# Patient Record
Sex: FEMALE | Race: WHITE | NOT HISPANIC OR LATINO | Employment: OTHER | URBAN - METROPOLITAN AREA
[De-identification: names, ages, dates, MRNs, and addresses within clinical notes are randomized per-mention and may not be internally consistent; named-entity substitution may affect disease eponyms.]

---

## 2017-01-24 ENCOUNTER — CONVERSION ENCOUNTER (OUTPATIENT)
Dept: FAMILY MEDICINE CLINIC | Facility: CLINIC | Age: 72
End: 2017-01-24

## 2017-01-25 LAB
ALBUMIN SERPL-MCNC: 4.4 G/DL (ref 3.5–4.8)
ALBUMIN/GLOB SERPL: 1.7 {RATIO} (ref 1.1–2.5)
ALP SERPL-CCNC: 85 IU/L (ref 39–117)
ALT SERPL-CCNC: 23 IU/L (ref 0–32)
AMBIG ABBREV CMP14 DEFAULT: NORMAL
AMBIG ABBREV LP DEFAULT: NORMAL
AST SERPL-CCNC: 25 IU/L (ref 0–40)
BASOPHILS # BLD AUTO: 0 X10E3/UL (ref 0–0.2)
BASOPHILS NFR BLD AUTO: 0 %
BILIRUB SERPL-MCNC: 0.6 MG/DL (ref 0–1.2)
BUN SERPL-MCNC: 21 MG/DL (ref 8–27)
BUN/CREAT SERPL: 16 (ref 11–26)
CALCIUM SERPL-MCNC: 10 MG/DL (ref 8.7–10.3)
CHLORIDE SERPL-SCNC: 106 MMOL/L (ref 96–106)
CHOLEST SERPL-MCNC: 157 MG/DL (ref 100–199)
CO2 SERPL-SCNC: 21 MMOL/L (ref 18–29)
CREAT SERPL-MCNC: 1.29 MG/DL (ref 0.57–1)
EOSINOPHIL # BLD AUTO: 0.1 X10E3/UL (ref 0–0.4)
EOSINOPHIL NFR BLD AUTO: 1 %
ERYTHROCYTE [DISTWIDTH] IN BLOOD BY AUTOMATED COUNT: 13.5 % (ref 12.3–15.4)
GLOBULIN SER CALC-MCNC: 2.6 G/DL (ref 1.5–4.5)
GLUCOSE SERPL-MCNC: 95 MG/DL (ref 65–99)
HCT VFR BLD AUTO: 43.6 % (ref 34–46.6)
HDLC SERPL-MCNC: 58 MG/DL
HGB BLD-MCNC: 14.5 G/DL (ref 11.1–15.9)
IMM GRANULOCYTES # BLD: 0 X10E3/UL (ref 0–0.1)
IMM GRANULOCYTES NFR BLD: 0 %
LDLC SERPL CALC-MCNC: 81 MG/DL (ref 0–99)
LYMPHOCYTES # BLD AUTO: 1.2 X10E3/UL (ref 0.7–3.1)
LYMPHOCYTES NFR BLD AUTO: 21 %
MCH RBC QN AUTO: 31.4 PG (ref 26.6–33)
MCHC RBC AUTO-ENTMCNC: 33.3 G/DL (ref 31.5–35.7)
MCV RBC AUTO: 94 FL (ref 79–97)
MONOCYTES # BLD AUTO: 0.4 X10E3/UL (ref 0.1–0.9)
MONOCYTES NFR BLD AUTO: 7 %
NEUTROPHILS # BLD AUTO: 3.9 X10E3/UL (ref 1.4–7)
NEUTROPHILS NFR BLD AUTO: 71 %
PLATELET # BLD AUTO: 372 X10E3/UL (ref 150–379)
POTASSIUM SERPL-SCNC: 4.6 MMOL/L (ref 3.5–5.2)
PROT SERPL-MCNC: 7 G/DL (ref 6–8.5)
RBC # BLD AUTO: 4.62 X10E6/UL (ref 3.77–5.28)
SODIUM SERPL-SCNC: 144 MMOL/L (ref 134–144)
TRIGL SERPL-MCNC: 91 MG/DL (ref 0–149)
TSH SERPL DL<=0.005 MIU/L-ACNC: 6.13 UIU/ML (ref 0.45–4.5)
VLDLC SERPL CALC-MCNC: 18 MG/DL (ref 5–40)
WBC # BLD AUTO: 5.6 X10E3/UL (ref 3.4–10.8)

## 2017-02-01 ENCOUNTER — OFFICE VISIT (OUTPATIENT)
Dept: CARDIOLOGY | Facility: CLINIC | Age: 72
End: 2017-02-01

## 2017-02-01 VITALS
WEIGHT: 215 LBS | HEIGHT: 67 IN | BODY MASS INDEX: 33.74 KG/M2 | SYSTOLIC BLOOD PRESSURE: 132 MMHG | DIASTOLIC BLOOD PRESSURE: 76 MMHG | HEART RATE: 51 BPM

## 2017-02-01 DIAGNOSIS — I48.0 PAROXYSMAL ATRIAL FIBRILLATION (HCC): Primary | ICD-10-CM

## 2017-02-01 PROCEDURE — 93000 ELECTROCARDIOGRAM COMPLETE: CPT | Performed by: INTERNAL MEDICINE

## 2017-02-01 PROCEDURE — 99213 OFFICE O/P EST LOW 20 MIN: CPT | Performed by: INTERNAL MEDICINE

## 2017-02-02 ENCOUNTER — RESULTS ENCOUNTER (OUTPATIENT)
Dept: FAMILY MEDICINE CLINIC | Facility: CLINIC | Age: 72
End: 2017-02-02

## 2017-02-02 ENCOUNTER — OFFICE VISIT (OUTPATIENT)
Dept: FAMILY MEDICINE CLINIC | Facility: CLINIC | Age: 72
End: 2017-02-02

## 2017-02-02 VITALS
HEIGHT: 67 IN | HEART RATE: 58 BPM | OXYGEN SATURATION: 94 % | SYSTOLIC BLOOD PRESSURE: 148 MMHG | WEIGHT: 216.8 LBS | DIASTOLIC BLOOD PRESSURE: 92 MMHG | BODY MASS INDEX: 34.03 KG/M2 | TEMPERATURE: 97.6 F

## 2017-02-02 DIAGNOSIS — I10 ESSENTIAL HYPERTENSION: Primary | ICD-10-CM

## 2017-02-02 DIAGNOSIS — M19.90 ARTHRITIS: ICD-10-CM

## 2017-02-02 DIAGNOSIS — Z00.00 HEALTH MAINTENANCE EXAMINATION: ICD-10-CM

## 2017-02-02 DIAGNOSIS — R79.89 INCREASED THYROID STIMULATING HORMONE LEVEL: ICD-10-CM

## 2017-02-02 DIAGNOSIS — Z12.31 ENCOUNTER FOR SCREENING MAMMOGRAM FOR MALIGNANT NEOPLASM OF BREAST: ICD-10-CM

## 2017-02-02 DIAGNOSIS — I48.0 PAROXYSMAL ATRIAL FIBRILLATION (HCC): ICD-10-CM

## 2017-02-02 DIAGNOSIS — E78.5 HYPERLIPIDEMIA, UNSPECIFIED HYPERLIPIDEMIA TYPE: ICD-10-CM

## 2017-02-02 PROCEDURE — 99214 OFFICE O/P EST MOD 30 MIN: CPT | Performed by: INTERNAL MEDICINE

## 2017-02-02 NOTE — PROGRESS NOTES
Subjective   Denise Staley is a 71 y.o. female. Patient is here today for follow-up on her hypertension, hyperlipidemia, paroxysmal atrial fibrillation and slightly elevated TSH.  She generally is been feeling okay and is had no arrhythmias.  She just saw her cardiologist last week.  She does have arthritic complaints and requires the diclofenac and is doing reasonably on it.  She does not wish to get a colonoscopy but is interested in getting a colouard test.  She does need a mammogram.    Chief Complaint   Patient presents with   • Follow-up     4 mon c/u    • Labs Only     discuss labs    • Joint Swelling     Bilateral swelling of the ankles           Vitals:    02/02/17 1308   BP: 148/92   Pulse: 58   Temp: 97.6 °F (36.4 °C)   SpO2: 94%     The following portions of the patient's history were reviewed and updated as appropriate: allergies, current medications, past family history, past medical history, past social history, past surgical history and problem list.    Past Medical History   Diagnosis Date   • Arthritis    • Atrial fibrillation    • Fatigue    • Health care maintenance    • Hyperlipidemia    • Hypertension    • Lower back pain    • Obesity    • Pelvic pain in female    • SOB (shortness of breath) on exertion    • TSH elevation    • Ventricular hypertrophy       No Known Allergies   Social History     Social History   • Marital status:      Spouse name: N/A   • Number of children: N/A   • Years of education: N/A     Occupational History   • Not on file.     Social History Main Topics   • Smoking status: Former Smoker   • Smokeless tobacco: Never Used      Comment: no caffeine use   • Alcohol use Yes      Comment: social drinker   • Drug use: Defer   • Sexual activity: Defer     Other Topics Concern   • Not on file     Social History Narrative        Current Outpatient Prescriptions:   •  amiodarone (PACERONE) 200 MG tablet, TAKE 1 TABLET EVERY DAY, Disp: 90 tablet, Rfl: 3  •  amLODIPine  (NORVASC) 10 MG tablet, Take 1 tablet by mouth daily., Disp: 90 tablet, Rfl: 3  •  atorvastatin (LIPITOR) 40 MG tablet, TAKE 1 TABLET EVERY DAY AT BEDTIME, Disp: 90 tablet, Rfl: 3  •  benazepril (LOTENSIN) 20 MG tablet, Take 1 tablet by mouth daily., Disp: 90 tablet, Rfl: 3  •  Calcium Carbonate-Vitamin D (CALCIUM + D PO), Take by mouth., Disp: , Rfl:   •  Cholecalciferol (VITAMIN D) 1000 UNITS tablet, Take by mouth., Disp: , Rfl:   •  diclofenac (VOLTAREN) 75 MG EC tablet, TAKE 1 TABLET DAILY, Disp: 90 tablet, Rfl: 3  •  estradiol (ESTRACE VAGINAL) 0.1 MG/GM vaginal cream, APPLY EXTERNALLY THREE TIMES A WEEK, Disp: 42.5 g, Rfl: 12  •  fluticasone (FLONASE) 50 MCG/ACT nasal spray, into each nostril daily., Disp: , Rfl:   •  Loratadine 10 MG capsule, Take  by mouth., Disp: , Rfl:   •  Omega-3 Fatty Acids (FISH OIL) 1200 MG capsule delayed-release, Take  by mouth., Disp: , Rfl:   •  XARELTO 20 MG tablet, TAKE 1 TABLET DAILY, Disp: 90 tablet, Rfl: 3  •  Acetaminophen (TYLENOL ARTHRITIS PAIN PO), Take by mouth., Disp: , Rfl:   •  cromolyn (OPTICROM) 4 % ophthalmic solution, , Disp: , Rfl:      Objective     History of Present Illness     Review of Systems   Constitutional: Negative.    HENT: Negative.    Eyes: Negative.    Respiratory: Negative.    Cardiovascular: Negative.    Gastrointestinal: Negative.    Genitourinary: Negative.    Musculoskeletal: Negative.    Skin: Negative.    Neurological: Negative.    Psychiatric/Behavioral: Negative.        Physical Exam   Constitutional: She is oriented to person, place, and time. She appears well-developed and well-nourished.   Pleasant, cooperative and in no distress with a blood pressure of 130/80   HENT:   Head: Normocephalic and atraumatic.   Eyes: EOM are normal. Pupils are equal, round, and reactive to light.   Neck: Normal range of motion. Neck supple. No thyromegaly present.   Cardiovascular: Normal rate, regular rhythm and normal heart sounds.    Pulmonary/Chest:  Effort normal and breath sounds normal. No respiratory distress. She has no wheezes. She has no rales.   Musculoskeletal: Normal range of motion. She exhibits no edema.   Neurological: She is alert and oriented to person, place, and time.   Skin: Skin is warm and dry.   Psychiatric: She has a normal mood and affect. Her behavior is normal.   Nursing note and vitals reviewed.      ASSESSMENT  overall the patient generally seems stable.  Her blood pressures controlled and she is in a regular cardiac rhythm.  Her CBC is completely normal.  CMP was fine aside from an elevated creatinine of 1.29.  Her lipid panel remains quite good and her TSH is slightly elevated at 6.13.       Problem List Items Addressed This Visit        Cardiovascular and Mediastinum    Atrial fibrillation    Hyperlipidemia    Hypertension - Primary       Musculoskeletal and Integument    Arthritis       Other    Increased thyroid stimulating hormone level      Other Visit Diagnoses     Health maintenance examination        Relevant Orders    Cologuard    Mammo Screening Bilateral With CAD    Encounter for screening mammogram for malignant neoplasm of breast         Relevant Orders    Mammo Screening Bilateral With CAD          PLAN  the patient does not wish colonoscopy so I will order a colo-guard test.  She is also due for mammograms.  She will check on a Zostavax immunization.  She will continue her current medicines and I would like to recheck her in 4 months with a CMP, lipid panel, TSH and free T4.    There are no Patient Instructions on file for this visit.  Return in about 4 months (around 6/2/2017) for with labs.

## 2017-02-19 NOTE — PROGRESS NOTES
Subjective:     Encounter Date:02/01/2017      Patient ID: Denise Staley is a 71 y.o. female.    Chief Complaint: PAF    History of Present Illness     Dear Dr. De Los Santos:      I had the pleasure of seeing the patient in cardiac follow-up today.  As you well know, she is a aimee, 71-year-old woman with history of paroxysmal atrial fibrillation.  She has been on Xarelto for anticoagulation.  Amiodarone has been the best drug for suppressing her atrial fibrillation.      She comes in for her yearly follow-up.  Since I have last seen her, she reports doing well.  She still is doing water aerobics three times a week.  She complains of mild exertional dyspnea but otherwise no major cardiac symptoms.          Review of Systems   All other systems reviewed and are negative.        ECG 12 Lead  Date/Time: 2/19/2017 1:19 PM  Performed by: BECK JARAMILLO  Authorized by: BECK JARAMILLO   Comparison: compared with previous ECG   Similar to previous ECG  Rhythm: sinus rhythm  BPM: 51  QRS axis: left  Other findings: PRWP               Objective:     Physical Exam   Constitutional: She is oriented to person, place, and time. She appears well-developed and well-nourished.   HENT:   Head: Normocephalic and atraumatic.   Neck: Normal range of motion. Neck supple.   Cardiovascular: Normal rate, regular rhythm and normal heart sounds.    Pulmonary/Chest: Effort normal and breath sounds normal.   Abdominal: Soft. Bowel sounds are normal.   Musculoskeletal: Normal range of motion.   Neurological: She is alert and oriented to person, place, and time.   Skin: Skin is warm and dry.   Psychiatric: She has a normal mood and affect. Her behavior is normal. Thought content normal.   Vitals reviewed.      Lab Review:       Assessment:          Diagnosis Plan   1. Paroxysmal atrial fibrillation            Plan:        It was a pleasure to see the patient in cardiac followup today.  She is doing well from the standpoint of her paroxysmal atrial  fibrillation.  She is on chronic amiodarone to control her symptoms.   Because of this, she should get yearly liver and thyroid profiles.   At this time, the patient seems to be tolerating things well.  She will see me again in one year, or sooner if her symptoms warrant.       Atrial Fibrillation and Atrial Flutter  Assessment  • The patient has paroxysmal atrial fibrillation  • This is non-valvular in etiology  • The patient's CHADS2-VASc score is 2  • A [id number] score of 2 or more is considered a high risk for a thromboembolic event  • Rivaroxaban prescribed    Plan  • Attempt to maintain sinus rhythm  • Continue rivaroxaban for antithrombotic therapy, bleeding issues discussed  • Continue amiodarone for rhythm control

## 2017-02-21 ENCOUNTER — HOSPITAL ENCOUNTER (OUTPATIENT)
Dept: MAMMOGRAPHY | Facility: HOSPITAL | Age: 72
Discharge: HOME | End: 2017-02-21
Admitting: INTERNAL MEDICINE

## 2017-02-21 DIAGNOSIS — Z12.31 ENCOUNTER FOR SCREENING MAMMOGRAM FOR MALIGNANT NEOPLASM OF BREAST: ICD-10-CM

## 2017-02-21 DIAGNOSIS — Z00.00 HEALTH MAINTENANCE EXAMINATION: ICD-10-CM

## 2017-02-21 PROCEDURE — G0202 SCR MAMMO BI INCL CAD: HCPCS

## 2017-05-16 RX ORDER — AMLODIPINE BESYLATE 10 MG/1
TABLET ORAL
Qty: 90 TABLET | Refills: 3 | Status: SHIPPED | OUTPATIENT
Start: 2017-05-16 | End: 2018-06-11 | Stop reason: SDUPTHER

## 2017-05-16 RX ORDER — BENAZEPRIL HYDROCHLORIDE 20 MG/1
TABLET ORAL
Qty: 90 TABLET | Refills: 3 | Status: SHIPPED | OUTPATIENT
Start: 2017-05-16 | End: 2018-06-11 | Stop reason: SDUPTHER

## 2017-05-24 LAB
ALBUMIN SERPL-MCNC: 4.7 G/DL (ref 3.5–4.8)
ALBUMIN/GLOB SERPL: 1.8 {RATIO} (ref 1.2–2.2)
ALP SERPL-CCNC: 94 IU/L (ref 39–117)
ALT SERPL-CCNC: 21 IU/L (ref 0–32)
AMBIG ABBREV CMP14 DEFAULT: NORMAL
AMBIG ABBREV LP DEFAULT: NORMAL
AST SERPL-CCNC: 20 IU/L (ref 0–40)
BILIRUB SERPL-MCNC: 0.8 MG/DL (ref 0–1.2)
BUN SERPL-MCNC: 25 MG/DL (ref 8–27)
BUN/CREAT SERPL: 13 (ref 12–28)
CALCIUM SERPL-MCNC: 10.4 MG/DL (ref 8.7–10.3)
CHLORIDE SERPL-SCNC: 102 MMOL/L (ref 96–106)
CHOLEST SERPL-MCNC: 173 MG/DL (ref 100–199)
CO2 SERPL-SCNC: 21 MMOL/L (ref 18–29)
CREAT SERPL-MCNC: 1.91 MG/DL (ref 0.57–1)
GLOBULIN SER CALC-MCNC: 2.6 G/DL (ref 1.5–4.5)
GLUCOSE SERPL-MCNC: 88 MG/DL (ref 65–99)
HDLC SERPL-MCNC: 63 MG/DL
LDLC SERPL CALC-MCNC: 87 MG/DL (ref 0–99)
POTASSIUM SERPL-SCNC: 4.8 MMOL/L (ref 3.5–5.2)
PROT SERPL-MCNC: 7.3 G/DL (ref 6–8.5)
SODIUM SERPL-SCNC: 144 MMOL/L (ref 134–144)
T4 FREE SERPL-MCNC: 1.48 NG/DL (ref 0.82–1.77)
TRIGL SERPL-MCNC: 113 MG/DL (ref 0–149)
TSH SERPL DL<=0.005 MIU/L-ACNC: 6.48 UIU/ML (ref 0.45–4.5)
VLDLC SERPL CALC-MCNC: 23 MG/DL (ref 5–40)

## 2017-06-01 ENCOUNTER — OFFICE VISIT (OUTPATIENT)
Dept: FAMILY MEDICINE CLINIC | Facility: CLINIC | Age: 72
End: 2017-06-01

## 2017-06-01 VITALS
BODY MASS INDEX: 34.91 KG/M2 | WEIGHT: 222.4 LBS | DIASTOLIC BLOOD PRESSURE: 70 MMHG | SYSTOLIC BLOOD PRESSURE: 150 MMHG | RESPIRATION RATE: 16 BRPM | HEIGHT: 67 IN | OXYGEN SATURATION: 98 % | HEART RATE: 48 BPM | TEMPERATURE: 98.1 F

## 2017-06-01 DIAGNOSIS — R06.02 SOB (SHORTNESS OF BREATH) ON EXERTION: ICD-10-CM

## 2017-06-01 DIAGNOSIS — R60.9 DEPENDENT EDEMA: ICD-10-CM

## 2017-06-01 DIAGNOSIS — I48.0 PAROXYSMAL ATRIAL FIBRILLATION (HCC): ICD-10-CM

## 2017-06-01 DIAGNOSIS — R79.89 INCREASED THYROID STIMULATING HORMONE LEVEL: ICD-10-CM

## 2017-06-01 DIAGNOSIS — E78.5 HYPERLIPIDEMIA, UNSPECIFIED HYPERLIPIDEMIA TYPE: ICD-10-CM

## 2017-06-01 DIAGNOSIS — I10 ESSENTIAL HYPERTENSION: Primary | ICD-10-CM

## 2017-06-01 PROCEDURE — 99214 OFFICE O/P EST MOD 30 MIN: CPT | Performed by: INTERNAL MEDICINE

## 2017-06-01 NOTE — PROGRESS NOTES
Subjective   Denise Staley is a 71 y.o. female. Patient is here today for follow-up on her paroxysmal atrial fibrillation, hypertension, hyperlipidemia, shortness of breath slightly high TSH and dependent edema.  Patient has been having a bit more edema and shortness of breath recently and did have an episode of atrial fibrillation that has converted.  She has an appointment tomorrow with her cardiologist, Dr. Worthy.  Otherwise her only other complaints arthritis generally    Chief Complaint   Patient presents with   • Follow-up     LABS           Vitals:    06/01/17 1251   BP: 150/70   Pulse: (!) 48   Resp: 16   Temp: 98.1 °F (36.7 °C)   SpO2: 98%     The following portions of the patient's history were reviewed and updated as appropriate: allergies, current medications, past family history, past medical history, past social history, past surgical history and problem list.    Past Medical History:   Diagnosis Date   • Arthritis    • Atrial fibrillation    • Fatigue    • Health care maintenance    • Hyperlipidemia    • Hypertension    • Lower back pain    • Obesity    • Pelvic pain in female    • SOB (shortness of breath) on exertion    • TSH elevation    • Ventricular hypertrophy       No Known Allergies   Social History     Social History   • Marital status:      Spouse name: N/A   • Number of children: N/A   • Years of education: N/A     Occupational History   • Not on file.     Social History Main Topics   • Smoking status: Former Smoker   • Smokeless tobacco: Former User      Comment: no caffeine use   • Alcohol use Yes      Comment: social drinker   • Drug use: Defer   • Sexual activity: Defer     Other Topics Concern   • Not on file     Social History Narrative        Current Outpatient Prescriptions:   •  Acetaminophen (TYLENOL ARTHRITIS PAIN PO), Take by mouth., Disp: , Rfl:   •  amiodarone (PACERONE) 200 MG tablet, TAKE 1 TABLET EVERY DAY, Disp: 90 tablet, Rfl: 3  •  amLODIPine (NORVASC) 10 MG  tablet, TAKE 1 TABLET EVERY DAY, Disp: 90 tablet, Rfl: 3  •  atorvastatin (LIPITOR) 40 MG tablet, TAKE 1 TABLET EVERY DAY AT BEDTIME, Disp: 90 tablet, Rfl: 3  •  benazepril (LOTENSIN) 20 MG tablet, TAKE 1 TABLET EVERY DAY, Disp: 90 tablet, Rfl: 3  •  Calcium Carbonate-Vitamin D (CALCIUM + D PO), Take by mouth., Disp: , Rfl:   •  Cholecalciferol (VITAMIN D) 1000 UNITS tablet, Take by mouth., Disp: , Rfl:   •  cromolyn (OPTICROM) 4 % ophthalmic solution, , Disp: , Rfl:   •  diclofenac (VOLTAREN) 75 MG EC tablet, TAKE 1 TABLET DAILY, Disp: 90 tablet, Rfl: 3  •  estradiol (ESTRACE VAGINAL) 0.1 MG/GM vaginal cream, APPLY EXTERNALLY THREE TIMES A WEEK, Disp: 42.5 g, Rfl: 12  •  fluticasone (FLONASE) 50 MCG/ACT nasal spray, into each nostril daily., Disp: , Rfl:   •  Loratadine 10 MG capsule, Take  by mouth., Disp: , Rfl:   •  Omega-3 Fatty Acids (FISH OIL) 1200 MG capsule delayed-release, Take  by mouth., Disp: , Rfl:   •  XARELTO 20 MG tablet, TAKE 1 TABLET DAILY, Disp: 90 tablet, Rfl: 3     Objective     History of Present Illness     Review of Systems   Constitutional: Negative.    HENT: Negative.    Eyes: Negative.    Respiratory: Positive for shortness of breath.    Cardiovascular: Negative.    Gastrointestinal: Negative.    Genitourinary: Negative.    Musculoskeletal: Positive for arthralgias.   Neurological: Negative.    Psychiatric/Behavioral: Negative.        Physical Exam   Constitutional: She appears well-developed and well-nourished.   Pleasant, cooperative and in no acute distress with a blood pressure of 120/80   HENT:   Head: Normocephalic and atraumatic.   Eyes: Conjunctivae are normal.   Neck: Normal range of motion. Neck supple.   Cardiovascular: Normal rate, regular rhythm and normal heart sounds.    Pulmonary/Chest: Effort normal and breath sounds normal. No respiratory distress. She has no wheezes. She has no rales.   Musculoskeletal: Normal range of motion. She exhibits edema.   2+ edema in the feet  and ankles bilaterally   Neurological: She is alert.   Skin: Skin is warm and dry.   Psychiatric: She has a normal mood and affect. Her behavior is normal.   Nursing note and vitals reviewed.      ASSESSMENT  patient currently seems stable with a well-controlled blood pressure and she is in a regular cardiac rhythm.  Her lungs sound pretty clear.  She does have some dependent edema which is variable.  Her CMP has an elevated creatinine of 1.91 which is a bit high for her normally and otherwise is generally normal.  Her lipid panel generally is good and TSH was slightly elevated but stable and free T4 was quite normal.     Problem List Items Addressed This Visit        Cardiovascular and Mediastinum    Atrial fibrillation    Hyperlipidemia    Hypertension - Primary       Respiratory    SOB (shortness of breath) on exertion       Other    Increased thyroid stimulating hormone level    Dependent edema          PLAN Medications will remain the same.  I want to see the patient in 4 months and would like to get a CBC, CMP, lipid panel, TSH and free T4    There are no Patient Instructions on file for this visit.  Return in about 4 months (around 10/1/2017) for with labs.

## 2017-06-02 ENCOUNTER — OFFICE VISIT (OUTPATIENT)
Dept: CARDIOLOGY | Facility: CLINIC | Age: 72
End: 2017-06-02

## 2017-06-02 VITALS
DIASTOLIC BLOOD PRESSURE: 82 MMHG | HEART RATE: 44 BPM | BODY MASS INDEX: 34.69 KG/M2 | HEIGHT: 67 IN | WEIGHT: 221 LBS | SYSTOLIC BLOOD PRESSURE: 132 MMHG | OXYGEN SATURATION: 100 %

## 2017-06-02 DIAGNOSIS — I48.0 PAROXYSMAL ATRIAL FIBRILLATION (HCC): Primary | ICD-10-CM

## 2017-06-02 PROCEDURE — 99213 OFFICE O/P EST LOW 20 MIN: CPT | Performed by: PHYSICIAN ASSISTANT

## 2017-06-02 PROCEDURE — 93000 ELECTROCARDIOGRAM COMPLETE: CPT | Performed by: PHYSICIAN ASSISTANT

## 2017-06-02 NOTE — PROGRESS NOTES
Date of Office Visit: 2017  Encounter Provider: IRVING Reyes  Place of Service: [facility] CARDIOLOGY  Patient Name: Denise Staley  :1945    Chief Complaint   Patient presents with   • Atrial Fibrillation     4 month follow up   :     HPI: Denise Staley is a 71 y.o. female , new to me, who presents today for follow-up.  Old records have been obtained and reviewed by me.  She is a patient of Dr. Worthy's with a past medical history significant for paroxysmal atrial fibrillation.  She has been on amiodarone and Xarelto.  She was last in our office to see Dr. Worthy on 2017.  At that visit, she was doing well.  She was doing water aerobics 3 times a week.  She had mild exertional dyspnea, but no other major complaints.  He did recommend that because she was on chronic amiodarone therapy, she gets yearly liver and thyroid panels.  Her last echocardiogram was in 2007.  This showed a mildly dilated left atrium, no significant valvular abnormalities, and normal LV size and function with an EF of 55-60%.   She is here today or than her scheduled visit because she had an episode of atrial fibrillation.  She states that  a couple of weeks ago she woke up and she felt terrible all day.  Then on Monday morning she finally checked her pulse and realize she was in atrial fibrillation.  Monday evening she took an extra amiodarone.  She was in A. fib although Tuesday, and then Wednesday was back in a regular rhythm.  She thinks the reason why she went into atrial fibrillation is because she has not been exercising like she normally does for about 3 weeks.  She normally does water aerobics 3 times a week, but the [place]oneal has been closed and she has been out of town.  She has had a 1 or 2 glasses of wine over the past couple weeks as well.  This is out of her normal routine.  She does have some swelling on her legs today that has been going on for about a  month.  Her primary care doctor does check her liver function test as well as her thyroid.  Her TSH was slightly elevated at last check, and her T4 was okay.  She is going to have this rechecked in 4 months.      Past Medical History:   Diagnosis Date   • Arthritis    • Atrial fibrillation    • Fatigue    • Health care maintenance    • Hyperlipidemia    • Hypertension    • Lower back pain    • Obesity    • Pelvic pain in female    • SOB (shortness of breath) on exertion    • TSH elevation    • Ventricular hypertrophy        History reviewed. No pertinent surgical history.    Social History     Social History   • Marital status:      Spouse name: N/A   • Number of children: N/A   • Years of education: N/A     Occupational History   • Not on file.     Social History Main Topics   • Smoking status: Former Smoker   • Smokeless tobacco: Former User      Comment: no caffeine use   • Alcohol use 1.2 oz/week     1 Cans of beer, 1 Glasses of wine per week      Comment: social drinker   • Drug use: Defer   • Sexual activity: Defer     Other Topics Concern   • Not on file     Social History Narrative       Family History   Problem Relation Age of Onset   • Lung cancer Mother 75   • Ovarian cancer Sister      60's   • Breast cancer Maternal Aunt      70's   • No Known Problems Father    • No Known Problems Maternal Grandmother    • No Known Problems Maternal Grandfather    • No Known Problems Paternal Grandmother    • No Known Problems Paternal Grandfather        Review of Systems   Constitution: Positive for malaise/fatigue. Negative for chills, fever, weight gain and weight loss.   HENT: Negative for ear pain, headaches, hearing loss, nosebleeds and sore throat.    Eyes: Negative for double vision, pain and visual disturbance.   Cardiovascular: Positive for palpitations. Negative for chest pain, dyspnea on exertion, irregular heartbeat, leg swelling, near-syncope, orthopnea, paroxysmal nocturnal dyspnea and syncope.    Respiratory: Negative for cough, shortness of breath, sleep disturbances due to breathing, snoring and wheezing.    Endocrine: Negative for cold intolerance, heat intolerance and polyuria.   Skin: Negative for itching and rash.   Musculoskeletal: Negative for joint pain, joint swelling and myalgias.   Gastrointestinal: Negative for abdominal pain, diarrhea, melena, nausea and vomiting.   Genitourinary: Negative for frequency, hematuria and hesitancy.   Neurological: Negative for excessive daytime sleepiness, light-headedness, numbness, paresthesias and seizures.   Psychiatric/Behavioral: Negative for altered mental status and depression.   Allergic/Immunologic: Negative.    All other systems reviewed and are negative.      No Known Allergies      Current Outpatient Prescriptions:   •  Acetaminophen (TYLENOL ARTHRITIS PAIN PO), Take 2 tablets by mouth As Needed., Disp: , Rfl:   •  amiodarone (PACERONE) 200 MG tablet, TAKE 1 TABLET EVERY DAY, Disp: 90 tablet, Rfl: 3  •  amLODIPine (NORVASC) 10 MG tablet, TAKE 1 TABLET EVERY DAY, Disp: 90 tablet, Rfl: 3  •  atorvastatin (LIPITOR) 40 MG tablet, TAKE 1 TABLET EVERY DAY AT BEDTIME, Disp: 90 tablet, Rfl: 3  •  benazepril (LOTENSIN) 20 MG tablet, TAKE 1 TABLET EVERY DAY, Disp: 90 tablet, Rfl: 3  •  Calcium Carbonate-Vitamin D (CALCIUM + D PO), Take 1 tablet by mouth Daily., Disp: , Rfl:   •  Cholecalciferol (VITAMIN D) 1000 UNITS tablet, Take 1,000 Units by mouth Daily., Disp: , Rfl:   •  cromolyn (OPTICROM) 4 % ophthalmic solution, Administer 2 drops to both eyes Daily., Disp: , Rfl:   •  diclofenac (VOLTAREN) 75 MG EC tablet, TAKE 1 TABLET DAILY, Disp: 90 tablet, Rfl: 3  •  estradiol (ESTRACE VAGINAL) 0.1 MG/GM vaginal cream, APPLY EXTERNALLY THREE TIMES A WEEK, Disp: 42.5 g, Rfl: 12  •  fluticasone (FLONASE) 50 MCG/ACT nasal spray, 2 sprays into each nostril Daily., Disp: , Rfl:   •  Loratadine 10 MG capsule, Take 1 tablet by mouth Daily., Disp: , Rfl:   •  Omega-3  "Fatty Acids (FISH OIL) 1200 MG capsule delayed-release, Take  by mouth., Disp: , Rfl:   •  XARELTO 20 MG tablet, TAKE 1 TABLET DAILY, Disp: 90 tablet, Rfl: 3     Objective:     Vitals:    06/02/17 1313 06/02/17 1322   BP: 130/78 132/82   BP Location: Right arm Left arm   Pulse: (!) 44    SpO2: 100%    Weight: 221 lb (100 kg)    Height: 67\" (170.2 cm)      Body mass index is 34.61 kg/(m^2).    PHYSICAL EXAM:    Physical Exam   Constitutional: She is oriented to person, place, and time. She appears well-developed and well-nourished. No distress.   HENT:   Head: Normocephalic and atraumatic.   Eyes: Pupils are equal, round, and reactive to light.   Neck: No JVD present. No thyromegaly present.   Cardiovascular: Normal rate, regular rhythm, normal heart sounds and intact distal pulses.    No murmur heard.  1+ bilateral lower extremity edema.   Pulmonary/Chest: Effort normal and breath sounds normal. No respiratory distress.   Abdominal: Soft. Bowel sounds are normal. She exhibits no distension. There is no splenomegaly or hepatomegaly. There is no tenderness.   Musculoskeletal: Normal range of motion. She exhibits no edema.   Neurological: She is alert and oriented to person, place, and time.   Skin: Skin is warm and dry. She is not diaphoretic. No erythema.   Psychiatric: She has a normal mood and affect. Her behavior is normal. Judgment normal.         ECG 12 Lead  Date/Time: 6/2/2017 1:34 PM  Performed by: REGGIE HENRIQUEZ.  Authorized by: REGGIE HENRIQUEZ.   Comparison: compared with previous ECG from 2/1/2017  Rhythm: sinus bradycardia  BPM: 44  Clinical impression: normal ECG  Comments: Indication: Paroxysmal atrial fibrillation.              Assessment:       Diagnosis Plan   1. Paroxysmal atrial fibrillation  ECG 12 Lead     Orders Placed This Encounter   Procedures   • ECG 12 Lead     This order was created via procedure documentation          Plan:       1.  Atrial Fibrillation and Atrial Flutter  Assessment  • " The patient has paroxysmal atrial fibrillation  • This is non-valvular in etiology  • The patient's CHADS2-VASc score is 3  • A [id number] score of 2 or more is considered a high risk for a thromboembolic event  • Apixaban prescribed    Plan  • Attempt to maintain sinus rhythm  • Continue apixaban for antithrombotic therapy, bleeding issues discussed  • Continue amiodarone for rhythm control    Subjective - Objective  • The patient underwent cardioversion   • She is in sinus rhythm today.  I think that we can talk this up to not exercising for 3 weeks as well as drinking a little bit of wine.  As far as her thyroid goes, she is going to get rechecked in 4 months.  If it still elevated, I will discuss this with Dr. Worthy.      She will follow-up with Dr. Worthy at her next appointment in February 2018.  As always, it has been a pleasure to participate in your patient's care.      Sincerely,         Monica Rueda PA-C

## 2017-08-31 RX ORDER — AMIODARONE HYDROCHLORIDE 200 MG/1
TABLET ORAL
Qty: 90 TABLET | Refills: 3 | Status: SHIPPED | OUTPATIENT
Start: 2017-08-31 | End: 2018-08-20 | Stop reason: SDUPTHER

## 2017-08-31 RX ORDER — ATORVASTATIN CALCIUM 40 MG/1
TABLET, FILM COATED ORAL
Qty: 90 TABLET | Refills: 3 | Status: SHIPPED | OUTPATIENT
Start: 2017-08-31 | End: 2018-02-06 | Stop reason: SDUPTHER

## 2017-09-22 LAB
ALBUMIN SERPL-MCNC: 4.4 G/DL (ref 3.5–4.8)
ALBUMIN/GLOB SERPL: 1.8 {RATIO} (ref 1.2–2.2)
ALP SERPL-CCNC: 80 IU/L (ref 39–117)
ALT SERPL-CCNC: 21 IU/L (ref 0–32)
AMBIG ABBREV CMP14 DEFAULT: NORMAL
AMBIG ABBREV LP DEFAULT: NORMAL
AST SERPL-CCNC: 19 IU/L (ref 0–40)
BILIRUB SERPL-MCNC: 0.6 MG/DL (ref 0–1.2)
BUN SERPL-MCNC: 22 MG/DL (ref 8–27)
BUN/CREAT SERPL: 17 (ref 12–28)
CALCIUM SERPL-MCNC: 10 MG/DL (ref 8.7–10.3)
CHLORIDE SERPL-SCNC: 105 MMOL/L (ref 96–106)
CHOLEST SERPL-MCNC: 171 MG/DL (ref 100–199)
CO2 SERPL-SCNC: 21 MMOL/L (ref 18–29)
CREAT SERPL-MCNC: 1.29 MG/DL (ref 0.57–1)
GLOBULIN SER CALC-MCNC: 2.5 G/DL (ref 1.5–4.5)
GLUCOSE SERPL-MCNC: 93 MG/DL (ref 65–99)
HDLC SERPL-MCNC: 77 MG/DL
LDLC SERPL CALC-MCNC: 78 MG/DL (ref 0–99)
POTASSIUM SERPL-SCNC: 4.3 MMOL/L (ref 3.5–5.2)
PROT SERPL-MCNC: 6.9 G/DL (ref 6–8.5)
SODIUM SERPL-SCNC: 146 MMOL/L (ref 134–144)
T4 SERPL-MCNC: 10.1 UG/DL (ref 4.5–12)
TRIGL SERPL-MCNC: 82 MG/DL (ref 0–149)
TSH SERPL DL<=0.005 MIU/L-ACNC: 4.2 UIU/ML (ref 0.45–4.5)
VLDLC SERPL CALC-MCNC: 16 MG/DL (ref 5–40)

## 2017-10-03 ENCOUNTER — OFFICE VISIT (OUTPATIENT)
Dept: FAMILY MEDICINE CLINIC | Facility: CLINIC | Age: 72
End: 2017-10-03

## 2017-10-03 VITALS
WEIGHT: 217.6 LBS | HEIGHT: 67 IN | BODY MASS INDEX: 34.15 KG/M2 | TEMPERATURE: 97.8 F | SYSTOLIC BLOOD PRESSURE: 140 MMHG | OXYGEN SATURATION: 96 % | DIASTOLIC BLOOD PRESSURE: 80 MMHG | HEART RATE: 51 BPM

## 2017-10-03 DIAGNOSIS — I10 ESSENTIAL HYPERTENSION: Primary | ICD-10-CM

## 2017-10-03 DIAGNOSIS — E78.5 HYPERLIPIDEMIA, UNSPECIFIED HYPERLIPIDEMIA TYPE: ICD-10-CM

## 2017-10-03 DIAGNOSIS — G89.29 CHRONIC LOW BACK PAIN WITHOUT SCIATICA, UNSPECIFIED BACK PAIN LATERALITY: ICD-10-CM

## 2017-10-03 DIAGNOSIS — N18.30 STAGE 3 CHRONIC KIDNEY DISEASE (HCC): ICD-10-CM

## 2017-10-03 DIAGNOSIS — R79.89 INCREASED THYROID STIMULATING HORMONE LEVEL: ICD-10-CM

## 2017-10-03 DIAGNOSIS — M19.90 ARTHRITIS: ICD-10-CM

## 2017-10-03 DIAGNOSIS — I48.0 PAROXYSMAL ATRIAL FIBRILLATION (HCC): ICD-10-CM

## 2017-10-03 DIAGNOSIS — M54.50 CHRONIC LOW BACK PAIN WITHOUT SCIATICA, UNSPECIFIED BACK PAIN LATERALITY: ICD-10-CM

## 2017-10-03 PROCEDURE — 99214 OFFICE O/P EST MOD 30 MIN: CPT | Performed by: INTERNAL MEDICINE

## 2017-10-12 RX ORDER — DICLOFENAC SODIUM 75 MG/1
TABLET, DELAYED RELEASE ORAL
Qty: 90 TABLET | Refills: 3 | Status: SHIPPED | OUTPATIENT
Start: 2017-10-12 | End: 2018-06-13 | Stop reason: ALTCHOICE

## 2018-01-02 RX ORDER — RIVAROXABAN 20 MG/1
TABLET, FILM COATED ORAL
Qty: 90 TABLET | Refills: 0 | Status: SHIPPED | OUTPATIENT
Start: 2018-01-02 | End: 2018-04-27 | Stop reason: SDUPTHER

## 2018-01-03 ENCOUNTER — TELEPHONE (OUTPATIENT)
Dept: CARDIOLOGY | Facility: CLINIC | Age: 73
End: 2018-01-03

## 2018-01-03 NOTE — TELEPHONE ENCOUNTER
01/03/18  12:33 PM  Denise Staley  1945    Home Phone [phone]   Mobile [phone]     Ms. Staley reports that she has been in Afib since 11/29/17. This is unusual for her as she usual converts back to NSR after a few days. Her HR has been 70s with -140/70-80. She notices some discomfort over her heart when she lays on either side. She does not have worsening SOA though she is undergoing treatment for bronchitis and ear infection since 12/26/17. She has some occasional lightheadedness.    She is on Xarelto, 200mg Amiodarone daily, 20mg benazepril daily, and 10mg amlodipine daily.    Does she need to be seen? Monica has openings next week. Dr. Worthy has openings in February.    Halima STONE RN

## 2018-01-03 NOTE — TELEPHONE ENCOUNTER
Halima, per verbal from Dr. Worthy, she should come in and be seen by Monica next week.    Thanks!

## 2018-01-08 ENCOUNTER — OFFICE VISIT (OUTPATIENT)
Dept: CARDIOLOGY | Facility: CLINIC | Age: 73
End: 2018-01-08

## 2018-01-08 ENCOUNTER — TELEPHONE (OUTPATIENT)
Dept: CARDIOLOGY | Facility: CLINIC | Age: 73
End: 2018-01-08

## 2018-01-08 VITALS
BODY MASS INDEX: 32.8 KG/M2 | SYSTOLIC BLOOD PRESSURE: 140 MMHG | OXYGEN SATURATION: 100 % | DIASTOLIC BLOOD PRESSURE: 80 MMHG | WEIGHT: 209 LBS | HEIGHT: 67 IN | HEART RATE: 80 BPM

## 2018-01-08 DIAGNOSIS — I48.0 PAROXYSMAL ATRIAL FIBRILLATION (HCC): Primary | ICD-10-CM

## 2018-01-08 PROCEDURE — 93000 ELECTROCARDIOGRAM COMPLETE: CPT | Performed by: PHYSICIAN ASSISTANT

## 2018-01-08 PROCEDURE — 99213 OFFICE O/P EST LOW 20 MIN: CPT | Performed by: PHYSICIAN ASSISTANT

## 2018-01-08 NOTE — PROGRESS NOTES
Date of Office Visit: 2018  Encounter Provider: IRVING Reyes  Place of Service: [facility] CARDIOLOGY  Patient Name: Denise Staley  :1945    Chief Complaint   Patient presents with   • Atrial Fibrillation     6 month follow up   :     HPI: Denise Staley is a 72 y.o. female who presents today for Follow-up.  Old records have been obtained and reviewed by me.  She is a patient of Dr. Worthy's with a past medical history significant for paroxysmal atrial fibrillation.  She has been on amiodarone and Xarelto.  She was last in our office to see Dr. Worthy on 2017.  At that visit, she was doing well.  She was doing water aerobics 3 times a week.  She had mild exertional dyspnea, but no other major complaints.  He did recommend that because she was on chronic amiodarone therapy, she gets yearly liver and thyroid panels.  Her last echocardiogram was in 2007.  This showed a mildly dilated left atrium, no significant valvular abnormalities, and normal LV size and function with an EF of 55-60%.   I first saw her in 2017 after she felt that she had an episode of atrial fibrillation.  She does get her thyroid function and her liver function tests it regularly by her primary care physician.  She had some lifestyle changes that included decreased exercise as well as increased alcohol intake, and I felt that this was the cause of her brief episode of atrial fibrillation.  When she saw me in , she was in a regular rhythm.  I did not make any changes to her medical regimen, and I recommended that she follow-up with Dr. Worthy for her regularly scheduled appointment in 2018.  She is here today earlier than her scheduled appointment because she has had another episode of atrial fibrillation.   She says that she's been in atrial fibrillation since the end of November.  In fact, she is an atrial fibrillation today.  She has been taking amiodarone 200 mg  "daily as well as an Xarelto 20 mg daily.  Overall she just does not feel well.  She doesn't feel like herself, and at times feels somewhat \"unease\" in her chest.  She denies any shortness of breath, chest pain, edema, dizziness, or syncope.  She just doesn't feel right.  She states she has been cardioverted before, and her last cardioversion was about 5 years ago.    Past Medical History:   Diagnosis Date   • Arthritis    • Atrial fibrillation    • Fatigue    • Health care maintenance    • Hyperlipidemia    • Hypertension    • Lower back pain    • Obesity    • Pelvic pain in female    • SOB (shortness of breath) on exertion    • TSH elevation    • Ventricular hypertrophy        History reviewed. No pertinent surgical history.    Social History     Social History   • Marital status:      Spouse name: N/A   • Number of children: N/A   • Years of education: N/A     Occupational History   • Not on file.     Social History Main Topics   • Smoking status: Former Smoker   • Smokeless tobacco: Former User      Comment: no caffeine use   • Alcohol use 1.2 oz/week     1 Glasses of wine, 1 Cans of beer per week      Comment: social drinker   • Drug use: Defer   • Sexual activity: Defer     Other Topics Concern   • Not on file     Social History Narrative       Family History   Problem Relation Age of Onset   • Lung cancer Mother 75   • Ovarian cancer Sister      60's   • Breast cancer Maternal Aunt      70's   • No Known Problems Father    • No Known Problems Maternal Grandmother    • No Known Problems Maternal Grandfather    • No Known Problems Paternal Grandmother    • No Known Problems Paternal Grandfather        Review of Systems   Constitution: Positive for night sweats. Negative for chills, fever, malaise/fatigue, weight gain and weight loss.   HENT: Negative for ear pain, hearing loss, nosebleeds and sore throat.    Eyes: Negative for double vision, pain and visual disturbance.   Cardiovascular: Negative for " chest pain, dyspnea on exertion, irregular heartbeat, leg swelling, near-syncope, orthopnea, palpitations, paroxysmal nocturnal dyspnea and syncope.   Respiratory: Negative for cough, shortness of breath, sleep disturbances due to breathing, snoring and wheezing.    Endocrine: Negative for cold intolerance, heat intolerance and polyuria.   Skin: Negative for itching and rash.   Musculoskeletal: Negative for joint pain, joint swelling and myalgias.   Gastrointestinal: Negative for abdominal pain, diarrhea, melena, nausea and vomiting.   Genitourinary: Negative for frequency, hematuria and hesitancy.   Neurological: Negative for excessive daytime sleepiness, headaches, light-headedness, numbness, paresthesias and seizures.   Psychiatric/Behavioral: Negative for altered mental status and depression.   Allergic/Immunologic: Negative.    All other systems reviewed and are negative.      No Known Allergies      Current Outpatient Prescriptions:   •  Acetaminophen (TYLENOL ARTHRITIS PAIN PO), Take 2 tablets by mouth As Needed., Disp: , Rfl:   •  amiodarone (PACERONE) 200 MG tablet, TAKE 1 TABLET EVERY DAY, Disp: 90 tablet, Rfl: 3  •  amLODIPine (NORVASC) 10 MG tablet, TAKE 1 TABLET EVERY DAY, Disp: 90 tablet, Rfl: 3  •  atorvastatin (LIPITOR) 40 MG tablet, TAKE 1 TABLET EVERY DAY AT BEDTIME, Disp: 90 tablet, Rfl: 3  •  benazepril (LOTENSIN) 20 MG tablet, TAKE 1 TABLET EVERY DAY, Disp: 90 tablet, Rfl: 3  •  Calcium Carbonate-Vitamin D (CALCIUM + D PO), Take 1 tablet by mouth Daily., Disp: , Rfl:   •  Cholecalciferol (VITAMIN D) 1000 UNITS tablet, Take 1,000 Units by mouth Daily., Disp: , Rfl:   •  cromolyn (OPTICROM) 4 % ophthalmic solution, Administer 2 drops to both eyes Daily., Disp: , Rfl:   •  diclofenac (VOLTAREN) 75 MG EC tablet, TAKE 1 TABLET DAILY, Disp: 90 tablet, Rfl: 3  •  fluticasone (FLONASE) 50 MCG/ACT nasal spray, 2 sprays into each nostril Daily., Disp: , Rfl:   •  Loratadine 10 MG capsule, Take 1 tablet by  "mouth Daily., Disp: , Rfl:   •  Omega-3 Fatty Acids (FISH OIL) 1200 MG capsule delayed-release, Take 1 tablet by mouth Daily., Disp: , Rfl:   •  XARELTO 20 MG tablet, TAKE 1 TABLET DAILY, Disp: 90 tablet, Rfl: 0     Objective:     Vitals:    01/08/18 1314 01/08/18 1554   BP: 124/82 140/80   BP Location: Right arm Left arm   Pulse: 80    SpO2: 100%    Weight: 94.8 kg (209 lb)    Height: 170.2 cm (67\")      Body mass index is 32.73 kg/(m^2).    PHYSICAL EXAM:    Physical Exam   Constitutional: She is oriented to person, place, and time. She appears well-developed and well-nourished. No distress.   HENT:   Head: Normocephalic and atraumatic.   Eyes: Pupils are equal, round, and reactive to light.   Neck: No JVD present. No thyromegaly present.   Cardiovascular: Normal rate, normal heart sounds and intact distal pulses.  An irregular rhythm present.   No murmur heard.  Pulmonary/Chest: Effort normal and breath sounds normal. No respiratory distress.   Abdominal: Soft. Bowel sounds are normal. She exhibits no distension. There is no splenomegaly or hepatomegaly. There is no tenderness.   Musculoskeletal: Normal range of motion. She exhibits no edema.   Neurological: She is alert and oriented to person, place, and time.   Skin: Skin is warm and dry. She is not diaphoretic. No erythema.   Psychiatric: She has a normal mood and affect. Her behavior is normal. Judgment normal.         ECG 12 Lead  Date/Time: 1/8/2018 4:36 PM  Performed by: REGGIE HENRIQUEZ.  Authorized by: REGGIE HENRIQUEZ   Comparison: compared with previous ECG from 6/2/2017  Rhythm: atrial fibrillation  BPM: 80  Clinical impression: abnormal ECG  Comments: Indication: Paroxysmal atrial fibrillation.              Assessment:       Diagnosis Plan   1. Paroxysmal atrial fibrillation  ECG 12 Lead    Adult Transthoracic Echo Complete W/ Cont if Necessary Per Protocol     Orders Placed This Encounter   Procedures   • ECG 12 Lead     This order was created via " procedure documentation   • Adult Transthoracic Echo Complete W/ Cont if Necessary Per Protocol     Standing Status:   Future     Order Specific Question:   Reason for exam?     Answer:   Arrhythmia     Order Specific Question:   Arrhythmias specification?     Answer:   AFib          Plan:       1.  Atrial Fibrillation and Atrial Flutter  Assessment  • The patient has paroxysmal atrial fibrillation  • The patient's CHADS2-VASc score is 3  • A [id number] score of 2 or more is considered a high risk for a thromboembolic event  • Rivaroxaban prescribed    Plan  • Continue in atrial fibrillation with rate control  • Continue rivaroxaban for antithrombotic therapy, bleeding issues discussed  • Continue amiodarone for rhythm control    Subjective - Objective  • The patient underwent cardioversion   • She is in atrial fibrillation today and has been since the end of November according to the patient.  Overall she does not feel well when she is in atrial fibrillation.  She has been anticoagulated with Xarelto, and she has been maintained on amiodarone 200 mg daily.  She did have her thyroid panel in September 2017 that was normal.  I think that at this time I like to check an echocardiogram on her.  Certainly we could try cardioversion again as she has been chronically anticoagulated with Xarelto.  I would like to keep her on amiodarone until a decision has been made about her plan of care.  At some point we may need to get Dr. Moe involved about a possible ablation.  Further recommendations will be made pending the results of the echocardiogram and a discussion with Dr. Worthy.        As always, it has been a pleasure to participate in your patient's care.      Sincerely,         Monica Rueda PA-C

## 2018-01-08 NOTE — TELEPHONE ENCOUNTER
During appointment we were advised to evacuate the building. She was given samples of Xarelto 20mg. Appointment was cancelled and needs to be rescheduled.

## 2018-01-09 ENCOUNTER — HOSPITAL ENCOUNTER (OUTPATIENT)
Dept: CARDIOLOGY | Facility: HOSPITAL | Age: 73
Discharge: HOME | End: 2018-01-09
Admitting: PHYSICIAN ASSISTANT

## 2018-01-09 VITALS
HEART RATE: 78 BPM | BODY MASS INDEX: 32.8 KG/M2 | WEIGHT: 209 LBS | DIASTOLIC BLOOD PRESSURE: 76 MMHG | SYSTOLIC BLOOD PRESSURE: 100 MMHG | HEIGHT: 67 IN

## 2018-01-09 DIAGNOSIS — I48.0 PAROXYSMAL ATRIAL FIBRILLATION (HCC): ICD-10-CM

## 2018-01-09 LAB
ASCENDING AORTA: 2.8 CM
BH CV ECHO MEAS - ACS: 1.8 CM
BH CV ECHO MEAS - AO MAX PG (FULL): 2.7 MMHG
BH CV ECHO MEAS - AO MAX PG: 7.1 MMHG
BH CV ECHO MEAS - AO MEAN PG (FULL): 1.4 MMHG
BH CV ECHO MEAS - AO MEAN PG: 3.8 MMHG
BH CV ECHO MEAS - AO ROOT AREA (BSA CORRECTED): 1.4
BH CV ECHO MEAS - AO ROOT AREA: 6.4 CM^2
BH CV ECHO MEAS - AO ROOT DIAM: 2.8 CM
BH CV ECHO MEAS - AO V2 MAX: 133 CM/SEC
BH CV ECHO MEAS - AO V2 MEAN: 89.2 CM/SEC
BH CV ECHO MEAS - AO V2 VTI: 27.6 CM
BH CV ECHO MEAS - AVA(I,A): 2.4 CM^2
BH CV ECHO MEAS - AVA(I,D): 2.4 CM^2
BH CV ECHO MEAS - AVA(V,A): 2.5 CM^2
BH CV ECHO MEAS - AVA(V,D): 2.5 CM^2
BH CV ECHO MEAS - BSA(HAYCOCK): 2.1 M^2
BH CV ECHO MEAS - BSA: 2.1 M^2
BH CV ECHO MEAS - BZI_BMI: 32.8 KILOGRAMS/M^2
BH CV ECHO MEAS - BZI_METRIC_HEIGHT: 170 CM
BH CV ECHO MEAS - BZI_METRIC_WEIGHT: 94.8 KG
BH CV ECHO MEAS - CONTRAST EF (2CH): 65.3 ML/M^2
BH CV ECHO MEAS - CONTRAST EF 4CH: 57.6 ML/M^2
BH CV ECHO MEAS - EDV(MOD-SP2): 49 ML
BH CV ECHO MEAS - EDV(MOD-SP4): 33 ML
BH CV ECHO MEAS - EDV(TEICH): 130.9 ML
BH CV ECHO MEAS - EF(CUBED): 79.6 %
BH CV ECHO MEAS - EF(MOD-SP2): 65.3 %
BH CV ECHO MEAS - EF(MOD-SP4): 57.6 %
BH CV ECHO MEAS - EF(TEICH): 71.6 %
BH CV ECHO MEAS - ESV(MOD-SP2): 17 ML
BH CV ECHO MEAS - ESV(MOD-SP4): 14 ML
BH CV ECHO MEAS - ESV(TEICH): 37.1 ML
BH CV ECHO MEAS - FS: 41.2 %
BH CV ECHO MEAS - IVS/LVPW: 1
BH CV ECHO MEAS - IVSD: 1 CM
BH CV ECHO MEAS - LAT PEAK E' VEL: 10 CM/SEC
BH CV ECHO MEAS - LV DIASTOLIC VOL/BSA (35-75): 16 ML/M^2
BH CV ECHO MEAS - LV MASS(C)D: 195.3 GRAMS
BH CV ECHO MEAS - LV MASS(C)DI: 94.9 GRAMS/M^2
BH CV ECHO MEAS - LV MAX PG: 4.3 MMHG
BH CV ECHO MEAS - LV MEAN PG: 2.4 MMHG
BH CV ECHO MEAS - LV SYSTOLIC VOL/BSA (12-30): 6.8 ML/M^2
BH CV ECHO MEAS - LV V1 MAX: 104.2 CM/SEC
BH CV ECHO MEAS - LV V1 MEAN: 70.3 CM/SEC
BH CV ECHO MEAS - LV V1 VTI: 20.8 CM
BH CV ECHO MEAS - LVIDD: 5.2 CM
BH CV ECHO MEAS - LVIDS: 3.1 CM
BH CV ECHO MEAS - LVLD AP2: 6.6 CM
BH CV ECHO MEAS - LVLD AP4: 5.9 CM
BH CV ECHO MEAS - LVLS AP2: 5.6 CM
BH CV ECHO MEAS - LVLS AP4: 5.2 CM
BH CV ECHO MEAS - LVOT AREA (M): 3.1 CM^2
BH CV ECHO MEAS - LVOT AREA: 3.1 CM^2
BH CV ECHO MEAS - LVOT DIAM: 2 CM
BH CV ECHO MEAS - LVPWD: 1 CM
BH CV ECHO MEAS - MED PEAK E' VEL: 10 CM/SEC
BH CV ECHO MEAS - MR MAX PG: 30.4 MMHG
BH CV ECHO MEAS - MR MAX VEL: 275.8 CM/SEC
BH CV ECHO MEAS - MV DEC SLOPE: 521.5 CM/SEC^2
BH CV ECHO MEAS - MV DEC TIME: 0.19 SEC
BH CV ECHO MEAS - MV E MAX VEL: 105 CM/SEC
BH CV ECHO MEAS - MV MAX PG: 3.8 MMHG
BH CV ECHO MEAS - MV MEAN PG: 1.7 MMHG
BH CV ECHO MEAS - MV P1/2T MAX VEL: 104.2 CM/SEC
BH CV ECHO MEAS - MV P1/2T: 58.5 MSEC
BH CV ECHO MEAS - MV V2 MAX: 97 CM/SEC
BH CV ECHO MEAS - MV V2 MEAN: 62 CM/SEC
BH CV ECHO MEAS - MV V2 VTI: 19.2 CM
BH CV ECHO MEAS - MVA P1/2T LCG: 2.1 CM^2
BH CV ECHO MEAS - MVA(P1/2T): 3.8 CM^2
BH CV ECHO MEAS - MVA(VTI): 3.4 CM^2
BH CV ECHO MEAS - PA ACC TIME: 0.1 SEC
BH CV ECHO MEAS - PA MAX PG (FULL): 2.7 MMHG
BH CV ECHO MEAS - PA MAX PG: 5 MMHG
BH CV ECHO MEAS - PA PR(ACCEL): 34.6 MMHG
BH CV ECHO MEAS - PA V2 MAX: 111.9 CM/SEC
BH CV ECHO MEAS - PULM A REVS DUR: 0.07 SEC
BH CV ECHO MEAS - PULM A REVS VEL: 20.5 CM/SEC
BH CV ECHO MEAS - PULM DIAS VEL: 38.6 CM/SEC
BH CV ECHO MEAS - PULM S/D: 1
BH CV ECHO MEAS - PULM SYS VEL: 38.6 CM/SEC
BH CV ECHO MEAS - PVA(V,A): 2.7 CM^2
BH CV ECHO MEAS - PVA(V,D): 2.7 CM^2
BH CV ECHO MEAS - QP/QS: 0.92
BH CV ECHO MEAS - RAP SYSTOLE: 3 MMHG
BH CV ECHO MEAS - RV MAX PG: 2.3 MMHG
BH CV ECHO MEAS - RV MEAN PG: 1.3 MMHG
BH CV ECHO MEAS - RV V1 MAX: 76.4 CM/SEC
BH CV ECHO MEAS - RV V1 MEAN: 54.2 CM/SEC
BH CV ECHO MEAS - RV V1 VTI: 15.1 CM
BH CV ECHO MEAS - RVOT AREA: 4 CM^2
BH CV ECHO MEAS - RVOT DIAM: 2.2 CM
BH CV ECHO MEAS - RVSP: 25 MMHG
BH CV ECHO MEAS - SI(AO): 85.5 ML/M^2
BH CV ECHO MEAS - SI(CUBED): 55.2 ML/M^2
BH CV ECHO MEAS - SI(LVOT): 31.7 ML/M^2
BH CV ECHO MEAS - SI(MOD-SP2): 15.5 ML/M^2
BH CV ECHO MEAS - SI(MOD-SP4): 9.2 ML/M^2
BH CV ECHO MEAS - SI(TEICH): 45.6 ML/M^2
BH CV ECHO MEAS - SUP REN AO DIAM: 2.1 CM
BH CV ECHO MEAS - SV(AO): 176 ML
BH CV ECHO MEAS - SV(CUBED): 113.5 ML
BH CV ECHO MEAS - SV(LVOT): 65.2 ML
BH CV ECHO MEAS - SV(MOD-SP2): 32 ML
BH CV ECHO MEAS - SV(MOD-SP4): 19 ML
BH CV ECHO MEAS - SV(RVOT): 59.7 ML
BH CV ECHO MEAS - SV(TEICH): 93.8 ML
BH CV ECHO MEAS - TAPSE (>1.6): 2 CM2
BH CV ECHO MEAS - TR MAX VEL: 234.8 CM/SEC
BH CV XLRA - RV BASE: 4 CM
BH CV XLRA - TDI S': 13 CM/SEC
E/E' RATIO: 11
LEFT ATRIUM VOLUME INDEX: 38 ML/M2
LV EF 2D ECHO EST: 58 %
SINUS: 3 CM
STJ: 2.4 CM

## 2018-01-09 PROCEDURE — 93306 TTE W/DOPPLER COMPLETE: CPT | Performed by: INTERNAL MEDICINE

## 2018-01-09 PROCEDURE — 93306 TTE W/DOPPLER COMPLETE: CPT

## 2018-01-10 ENCOUNTER — TELEPHONE (OUTPATIENT)
Dept: CARDIOLOGY | Facility: CLINIC | Age: 73
End: 2018-01-10

## 2018-01-10 DIAGNOSIS — I48.0 PAROXYSMAL ATRIAL FIBRILLATION (HCC): Primary | ICD-10-CM

## 2018-01-10 NOTE — TELEPHONE ENCOUNTER
I discussed the plan of care with the patient and with Dr. Worthy.  We are recommending a cardioversion.  We will call her to get this set up.  I've asked her to stay on the Xarelto as well as the amiodarone.

## 2018-01-22 ENCOUNTER — LAB (OUTPATIENT)
Dept: LAB | Facility: HOSPITAL | Age: 73
End: 2018-01-22
Attending: INTERNAL MEDICINE

## 2018-01-22 ENCOUNTER — TRANSCRIBE ORDERS (OUTPATIENT)
Dept: ADMINISTRATIVE | Facility: HOSPITAL | Age: 73
End: 2018-01-22

## 2018-01-22 DIAGNOSIS — Z01.810 PRE-OPERATIVE CARDIOVASCULAR EXAMINATION: Primary | ICD-10-CM

## 2018-01-22 DIAGNOSIS — Z01.810 PRE-OPERATIVE CARDIOVASCULAR EXAMINATION: ICD-10-CM

## 2018-01-22 DIAGNOSIS — Z13.6 SCREENING FOR ISCHEMIC HEART DISEASE: ICD-10-CM

## 2018-01-22 LAB
ANION GAP SERPL CALCULATED.3IONS-SCNC: 15.4 MMOL/L
BASOPHILS # BLD AUTO: 0.02 10*3/MM3 (ref 0–0.2)
BASOPHILS NFR BLD AUTO: 0.2 % (ref 0–1.5)
BUN BLD-MCNC: 21 MG/DL (ref 8–23)
BUN/CREAT SERPL: 15 (ref 7–25)
CALCIUM SPEC-SCNC: 10 MG/DL (ref 8.6–10.5)
CHLORIDE SERPL-SCNC: 105 MMOL/L (ref 98–107)
CO2 SERPL-SCNC: 22.6 MMOL/L (ref 22–29)
CREAT BLD-MCNC: 1.4 MG/DL (ref 0.57–1)
DEPRECATED RDW RBC AUTO: 56.8 FL (ref 37–54)
EOSINOPHIL # BLD AUTO: 0.1 10*3/MM3 (ref 0–0.7)
EOSINOPHIL NFR BLD AUTO: 1 % (ref 0.3–6.2)
ERYTHROCYTE [DISTWIDTH] IN BLOOD BY AUTOMATED COUNT: 14.9 % (ref 11.7–13)
GFR SERPL CREATININE-BSD FRML MDRD: 37 ML/MIN/1.73
GLUCOSE BLD-MCNC: 78 MG/DL (ref 65–99)
HCT VFR BLD AUTO: 46.8 % (ref 35.6–45.5)
HGB BLD-MCNC: 15.5 G/DL (ref 11.9–15.5)
IMM GRANULOCYTES # BLD: 0.02 10*3/MM3 (ref 0–0.03)
IMM GRANULOCYTES NFR BLD: 0.2 % (ref 0–0.5)
LYMPHOCYTES # BLD AUTO: 1.25 10*3/MM3 (ref 0.9–4.8)
LYMPHOCYTES NFR BLD AUTO: 12.4 % (ref 19.6–45.3)
MCH RBC QN AUTO: 34.1 PG (ref 26.9–32)
MCHC RBC AUTO-ENTMCNC: 33.1 G/DL (ref 32.4–36.3)
MCV RBC AUTO: 103.1 FL (ref 80.5–98.2)
MONOCYTES # BLD AUTO: 0.87 10*3/MM3 (ref 0.2–1.2)
MONOCYTES NFR BLD AUTO: 8.6 % (ref 5–12)
NEUTROPHILS # BLD AUTO: 7.85 10*3/MM3 (ref 1.9–8.1)
NEUTROPHILS NFR BLD AUTO: 77.6 % (ref 42.7–76)
PLATELET # BLD AUTO: 461 10*3/MM3 (ref 140–500)
PMV BLD AUTO: 11.5 FL (ref 6–12)
POTASSIUM BLD-SCNC: 4.5 MMOL/L (ref 3.5–5.2)
RBC # BLD AUTO: 4.54 10*6/MM3 (ref 3.9–5.2)
SODIUM BLD-SCNC: 143 MMOL/L (ref 136–145)
WBC NRBC COR # BLD: 10.11 10*3/MM3 (ref 4.5–10.7)

## 2018-01-22 PROCEDURE — 80048 BASIC METABOLIC PNL TOTAL CA: CPT

## 2018-01-22 PROCEDURE — 36415 COLL VENOUS BLD VENIPUNCTURE: CPT

## 2018-01-22 PROCEDURE — 85025 COMPLETE CBC W/AUTO DIFF WBC: CPT

## 2018-01-23 ENCOUNTER — HOSPITAL ENCOUNTER (OUTPATIENT)
Dept: CARDIOLOGY | Facility: HOSPITAL | Age: 73
Discharge: HOME | End: 2018-01-23
Admitting: INTERNAL MEDICINE

## 2018-01-23 VITALS
HEART RATE: 53 BPM | BODY MASS INDEX: 32.8 KG/M2 | TEMPERATURE: 98.2 F | DIASTOLIC BLOOD PRESSURE: 92 MMHG | WEIGHT: 209 LBS | HEIGHT: 67 IN | OXYGEN SATURATION: 95 % | RESPIRATION RATE: 16 BRPM | SYSTOLIC BLOOD PRESSURE: 131 MMHG

## 2018-01-23 DIAGNOSIS — I48.0 PAROXYSMAL ATRIAL FIBRILLATION (HCC): ICD-10-CM

## 2018-01-23 PROCEDURE — 92960 CARDIOVERSION ELECTRIC EXT: CPT

## 2018-01-23 PROCEDURE — 92960 CARDIOVERSION ELECTRIC EXT: CPT | Performed by: INTERNAL MEDICINE

## 2018-01-23 PROCEDURE — 93005 ELECTROCARDIOGRAM TRACING: CPT | Performed by: INTERNAL MEDICINE

## 2018-01-23 PROCEDURE — 93010 ELECTROCARDIOGRAM REPORT: CPT | Performed by: INTERNAL MEDICINE

## 2018-01-23 RX ORDER — LIDOCAINE HYDROCHLORIDE 10 MG/ML
0.1 INJECTION, SOLUTION EPIDURAL; INFILTRATION; INTRACAUDAL; PERINEURAL ONCE AS NEEDED
Status: DISCONTINUED | OUTPATIENT
Start: 2018-01-23 | End: 2018-01-24 | Stop reason: HOSPADM

## 2018-01-23 RX ORDER — SODIUM CHLORIDE 9 MG/ML
75 INJECTION, SOLUTION INTRAVENOUS CONTINUOUS
Status: DISCONTINUED | OUTPATIENT
Start: 2018-01-23 | End: 2018-01-24 | Stop reason: HOSPADM

## 2018-01-23 RX ORDER — SODIUM CHLORIDE 0.9 % (FLUSH) 0.9 %
1-10 SYRINGE (ML) INJECTION AS NEEDED
Status: DISCONTINUED | OUTPATIENT
Start: 2018-01-23 | End: 2018-01-24 | Stop reason: HOSPADM

## 2018-01-23 RX ADMIN — SODIUM CHLORIDE 75 ML/HR: 9 INJECTION, SOLUTION INTRAVENOUS at 08:31

## 2018-01-23 RX ADMIN — METHOHEXITAL SODIUM 50 MG: 500 INJECTION, POWDER, LYOPHILIZED, FOR SOLUTION INTRAMUSCULAR; INTRAVENOUS; RECTAL at 09:32

## 2018-01-23 NOTE — INTERVAL H&P NOTE
H&P reviewed. The patient was examined and there are no changes to the H&P.     PAF, here for cardioversion.  Has been on xarelto.

## 2018-01-23 NOTE — H&P (VIEW-ONLY)
Date of Office Visit: 2018  Encounter Provider: IRVING Reyes  Place of Service: [facility] CARDIOLOGY  Patient Name: Denise Staley  :1945    Chief Complaint   Patient presents with   • Atrial Fibrillation     6 month follow up   :     HPI: Denise Staley is a 72 y.o. female who presents today for Follow-up.  Old records have been obtained and reviewed by me.  She is a patient of Dr. Worthy's with a past medical history significant for paroxysmal atrial fibrillation.  She has been on amiodarone and Xarelto.  She was last in our office to see Dr. Worthy on 2017.  At that visit, she was doing well.  She was doing water aerobics 3 times a week.  She had mild exertional dyspnea, but no other major complaints.  He did recommend that because she was on chronic amiodarone therapy, she gets yearly liver and thyroid panels.  Her last echocardiogram was in 2007.  This showed a mildly dilated left atrium, no significant valvular abnormalities, and normal LV size and function with an EF of 55-60%.   I first saw her in 2017 after she felt that she had an episode of atrial fibrillation.  She does get her thyroid function and her liver function tests it regularly by her primary care physician.  She had some lifestyle changes that included decreased exercise as well as increased alcohol intake, and I felt that this was the cause of her brief episode of atrial fibrillation.  When she saw me in , she was in a regular rhythm.  I did not make any changes to her medical regimen, and I recommended that she follow-up with Dr. Worthy for her regularly scheduled appointment in 2018.  She is here today earlier than her scheduled appointment because she has had another episode of atrial fibrillation.   She says that she's been in atrial fibrillation since the end of November.  In fact, she is an atrial fibrillation today.  She has been taking amiodarone 200 mg  "daily as well as an Xarelto 20 mg daily.  Overall she just does not feel well.  She doesn't feel like herself, and at times feels somewhat \"unease\" in her chest.  She denies any shortness of breath, chest pain, edema, dizziness, or syncope.  She just doesn't feel right.  She states she has been cardioverted before, and her last cardioversion was about 5 years ago.    Past Medical History:   Diagnosis Date   • Arthritis    • Atrial fibrillation    • Fatigue    • Health care maintenance    • Hyperlipidemia    • Hypertension    • Lower back pain    • Obesity    • Pelvic pain in female    • SOB (shortness of breath) on exertion    • TSH elevation    • Ventricular hypertrophy        History reviewed. No pertinent surgical history.    Social History     Social History   • Marital status:      Spouse name: N/A   • Number of children: N/A   • Years of education: N/A     Occupational History   • Not on file.     Social History Main Topics   • Smoking status: Former Smoker   • Smokeless tobacco: Former User      Comment: no caffeine use   • Alcohol use 1.2 oz/week     1 Glasses of wine, 1 Cans of beer per week      Comment: social drinker   • Drug use: Defer   • Sexual activity: Defer     Other Topics Concern   • Not on file     Social History Narrative       Family History   Problem Relation Age of Onset   • Lung cancer Mother 75   • Ovarian cancer Sister      60's   • Breast cancer Maternal Aunt      70's   • No Known Problems Father    • No Known Problems Maternal Grandmother    • No Known Problems Maternal Grandfather    • No Known Problems Paternal Grandmother    • No Known Problems Paternal Grandfather        Review of Systems   Constitution: Positive for night sweats. Negative for chills, fever, malaise/fatigue, weight gain and weight loss.   HENT: Negative for ear pain, hearing loss, nosebleeds and sore throat.    Eyes: Negative for double vision, pain and visual disturbance.   Cardiovascular: Negative for " chest pain, dyspnea on exertion, irregular heartbeat, leg swelling, near-syncope, orthopnea, palpitations, paroxysmal nocturnal dyspnea and syncope.   Respiratory: Negative for cough, shortness of breath, sleep disturbances due to breathing, snoring and wheezing.    Endocrine: Negative for cold intolerance, heat intolerance and polyuria.   Skin: Negative for itching and rash.   Musculoskeletal: Negative for joint pain, joint swelling and myalgias.   Gastrointestinal: Negative for abdominal pain, diarrhea, melena, nausea and vomiting.   Genitourinary: Negative for frequency, hematuria and hesitancy.   Neurological: Negative for excessive daytime sleepiness, headaches, light-headedness, numbness, paresthesias and seizures.   Psychiatric/Behavioral: Negative for altered mental status and depression.   Allergic/Immunologic: Negative.    All other systems reviewed and are negative.      No Known Allergies      Current Outpatient Prescriptions:   •  Acetaminophen (TYLENOL ARTHRITIS PAIN PO), Take 2 tablets by mouth As Needed., Disp: , Rfl:   •  amiodarone (PACERONE) 200 MG tablet, TAKE 1 TABLET EVERY DAY, Disp: 90 tablet, Rfl: 3  •  amLODIPine (NORVASC) 10 MG tablet, TAKE 1 TABLET EVERY DAY, Disp: 90 tablet, Rfl: 3  •  atorvastatin (LIPITOR) 40 MG tablet, TAKE 1 TABLET EVERY DAY AT BEDTIME, Disp: 90 tablet, Rfl: 3  •  benazepril (LOTENSIN) 20 MG tablet, TAKE 1 TABLET EVERY DAY, Disp: 90 tablet, Rfl: 3  •  Calcium Carbonate-Vitamin D (CALCIUM + D PO), Take 1 tablet by mouth Daily., Disp: , Rfl:   •  Cholecalciferol (VITAMIN D) 1000 UNITS tablet, Take 1,000 Units by mouth Daily., Disp: , Rfl:   •  cromolyn (OPTICROM) 4 % ophthalmic solution, Administer 2 drops to both eyes Daily., Disp: , Rfl:   •  diclofenac (VOLTAREN) 75 MG EC tablet, TAKE 1 TABLET DAILY, Disp: 90 tablet, Rfl: 3  •  fluticasone (FLONASE) 50 MCG/ACT nasal spray, 2 sprays into each nostril Daily., Disp: , Rfl:   •  Loratadine 10 MG capsule, Take 1 tablet by  "mouth Daily., Disp: , Rfl:   •  Omega-3 Fatty Acids (FISH OIL) 1200 MG capsule delayed-release, Take 1 tablet by mouth Daily., Disp: , Rfl:   •  XARELTO 20 MG tablet, TAKE 1 TABLET DAILY, Disp: 90 tablet, Rfl: 0     Objective:     Vitals:    01/08/18 1314 01/08/18 1554   BP: 124/82 140/80   BP Location: Right arm Left arm   Pulse: 80    SpO2: 100%    Weight: 94.8 kg (209 lb)    Height: 170.2 cm (67\")      Body mass index is 32.73 kg/(m^2).    PHYSICAL EXAM:    Physical Exam   Constitutional: She is oriented to person, place, and time. She appears well-developed and well-nourished. No distress.   HENT:   Head: Normocephalic and atraumatic.   Eyes: Pupils are equal, round, and reactive to light.   Neck: No JVD present. No thyromegaly present.   Cardiovascular: Normal rate, normal heart sounds and intact distal pulses.  An irregular rhythm present.   No murmur heard.  Pulmonary/Chest: Effort normal and breath sounds normal. No respiratory distress.   Abdominal: Soft. Bowel sounds are normal. She exhibits no distension. There is no splenomegaly or hepatomegaly. There is no tenderness.   Musculoskeletal: Normal range of motion. She exhibits no edema.   Neurological: She is alert and oriented to person, place, and time.   Skin: Skin is warm and dry. She is not diaphoretic. No erythema.   Psychiatric: She has a normal mood and affect. Her behavior is normal. Judgment normal.         ECG 12 Lead  Date/Time: 1/8/2018 4:36 PM  Performed by: REGGIE HENRIQUEZ.  Authorized by: REGGIE HENRIQUEZ   Comparison: compared with previous ECG from 6/2/2017  Rhythm: atrial fibrillation  BPM: 80  Clinical impression: abnormal ECG  Comments: Indication: Paroxysmal atrial fibrillation.              Assessment:       Diagnosis Plan   1. Paroxysmal atrial fibrillation  ECG 12 Lead    Adult Transthoracic Echo Complete W/ Cont if Necessary Per Protocol     Orders Placed This Encounter   Procedures   • ECG 12 Lead     This order was created via " procedure documentation   • Adult Transthoracic Echo Complete W/ Cont if Necessary Per Protocol     Standing Status:   Future     Order Specific Question:   Reason for exam?     Answer:   Arrhythmia     Order Specific Question:   Arrhythmias specification?     Answer:   AFib          Plan:       1.  Atrial Fibrillation and Atrial Flutter  Assessment  • The patient has paroxysmal atrial fibrillation  • The patient's CHADS2-VASc score is 3  • A [id number] score of 2 or more is considered a high risk for a thromboembolic event  • Rivaroxaban prescribed    Plan  • Continue in atrial fibrillation with rate control  • Continue rivaroxaban for antithrombotic therapy, bleeding issues discussed  • Continue amiodarone for rhythm control    Subjective - Objective  • The patient underwent cardioversion   • She is in atrial fibrillation today and has been since the end of November according to the patient.  Overall she does not feel well when she is in atrial fibrillation.  She has been anticoagulated with Xarelto, and she has been maintained on amiodarone 200 mg daily.  She did have her thyroid panel in September 2017 that was normal.  I think that at this time I like to check an echocardiogram on her.  Certainly we could try cardioversion again as she has been chronically anticoagulated with Xarelto.  I would like to keep her on amiodarone until a decision has been made about her plan of care.  At some point we may need to get Dr. Moe involved about a possible ablation.  Further recommendations will be made pending the results of the echocardiogram and a discussion with Dr. Worthy.        As always, it has been a pleasure to participate in your patient's care.      Sincerely,         Monica Rueda PA-C

## 2018-01-23 NOTE — PLAN OF CARE
Problem: Perioperative Period (Adult)  Goal: Signs and Symptoms of Listed Potential Problems Will be Absent or Manageable (Perioperative Period)  Outcome: Outcome(s) achieved Date Met: 01/23/18      Problem: Patient Care Overview (Adult)  Goal: Plan of Care Review  Outcome: Outcome(s) achieved Date Met: 01/23/18 01/23/18 1012   Coping/Psychosocial Response Interventions   Plan Of Care Reviewed With patient;daughter   Patient Care Overview   Progress improving   Outcome Evaluation   Outcome Summary/Follow up Plan remains in SB. Ready for D/C.      Goal: Adult Individualization and Mutuality  Outcome: Outcome(s) achieved Date Met: 01/23/18    Goal: Discharge Needs Assessment  Outcome: Outcome(s) achieved Date Met: 01/23/18

## 2018-01-30 LAB
ALBUMIN SERPL-MCNC: 4 G/DL (ref 3.5–4.8)
ALBUMIN/GLOB SERPL: 1.5 {RATIO} (ref 1.2–2.2)
ALP SERPL-CCNC: 72 IU/L (ref 39–117)
ALT SERPL-CCNC: 17 IU/L (ref 0–32)
AMBIG ABBREV CMP14 DEFAULT: NORMAL
AMBIG ABBREV LP DEFAULT: NORMAL
AST SERPL-CCNC: 19 IU/L (ref 0–40)
BASOPHILS # BLD AUTO: 0 X10E3/UL (ref 0–0.2)
BASOPHILS NFR BLD AUTO: 0 %
BILIRUB SERPL-MCNC: 0.6 MG/DL (ref 0–1.2)
BUN SERPL-MCNC: 24 MG/DL (ref 8–27)
BUN/CREAT SERPL: 16 (ref 12–28)
CALCIUM SERPL-MCNC: 9.9 MG/DL (ref 8.7–10.3)
CHLORIDE SERPL-SCNC: 106 MMOL/L (ref 96–106)
CHOLEST SERPL-MCNC: 161 MG/DL (ref 100–199)
CO2 SERPL-SCNC: 21 MMOL/L (ref 18–29)
CREAT SERPL-MCNC: 1.47 MG/DL (ref 0.57–1)
EOSINOPHIL # BLD AUTO: 0.2 X10E3/UL (ref 0–0.4)
EOSINOPHIL NFR BLD AUTO: 3 %
ERYTHROCYTE [DISTWIDTH] IN BLOOD BY AUTOMATED COUNT: 14.2 % (ref 12.3–15.4)
GFR SERPLBLD CREATININE-BSD FMLA CKD-EPI: 35 ML/MIN/1.73
GFR SERPLBLD CREATININE-BSD FMLA CKD-EPI: 41 ML/MIN/1.73
GLOBULIN SER CALC-MCNC: 2.7 G/DL (ref 1.5–4.5)
GLUCOSE SERPL-MCNC: 86 MG/DL (ref 65–99)
HCT VFR BLD AUTO: 42.6 % (ref 34–46.6)
HDLC SERPL-MCNC: 59 MG/DL
HGB BLD-MCNC: 14.2 G/DL (ref 11.1–15.9)
IMM GRANULOCYTES # BLD: 0 X10E3/UL (ref 0–0.1)
IMM GRANULOCYTES NFR BLD: 0 %
LDLC SERPL CALC-MCNC: 82 MG/DL (ref 0–99)
LYMPHOCYTES # BLD AUTO: 0.8 X10E3/UL (ref 0.7–3.1)
LYMPHOCYTES NFR BLD AUTO: 12 %
MCH RBC QN AUTO: 32.9 PG (ref 26.6–33)
MCHC RBC AUTO-ENTMCNC: 33.3 G/DL (ref 31.5–35.7)
MCV RBC AUTO: 99 FL (ref 79–97)
MONOCYTES # BLD AUTO: 0.4 X10E3/UL (ref 0.1–0.9)
MONOCYTES NFR BLD AUTO: 7 %
NEUTROPHILS # BLD AUTO: 5.2 X10E3/UL (ref 1.4–7)
NEUTROPHILS NFR BLD AUTO: 78 %
PLATELET # BLD AUTO: 337 X10E3/UL (ref 150–379)
POTASSIUM SERPL-SCNC: 4.6 MMOL/L (ref 3.5–5.2)
PROT SERPL-MCNC: 6.7 G/DL (ref 6–8.5)
RBC # BLD AUTO: 4.32 X10E6/UL (ref 3.77–5.28)
SODIUM SERPL-SCNC: 144 MMOL/L (ref 134–144)
T4 FREE SERPL-MCNC: 1.56 NG/DL (ref 0.82–1.77)
TRIGL SERPL-MCNC: 100 MG/DL (ref 0–149)
TSH SERPL DL<=0.005 MIU/L-ACNC: 4.23 UIU/ML (ref 0.45–4.5)
VLDLC SERPL CALC-MCNC: 20 MG/DL (ref 5–40)
WBC # BLD AUTO: 6.8 X10E3/UL (ref 3.4–10.8)

## 2018-02-01 ENCOUNTER — OFFICE VISIT (OUTPATIENT)
Dept: CARDIOLOGY | Facility: CLINIC | Age: 73
End: 2018-02-01

## 2018-02-01 VITALS
DIASTOLIC BLOOD PRESSURE: 72 MMHG | SYSTOLIC BLOOD PRESSURE: 136 MMHG | HEART RATE: 63 BPM | BODY MASS INDEX: 32.8 KG/M2 | WEIGHT: 209 LBS | HEIGHT: 67 IN

## 2018-02-01 DIAGNOSIS — E78.5 HYPERLIPIDEMIA, UNSPECIFIED HYPERLIPIDEMIA TYPE: ICD-10-CM

## 2018-02-01 DIAGNOSIS — I48.19 PERSISTENT ATRIAL FIBRILLATION (HCC): Primary | ICD-10-CM

## 2018-02-01 DIAGNOSIS — I10 ESSENTIAL HYPERTENSION: ICD-10-CM

## 2018-02-01 PROCEDURE — 99214 OFFICE O/P EST MOD 30 MIN: CPT | Performed by: INTERNAL MEDICINE

## 2018-02-01 PROCEDURE — 93000 ELECTROCARDIOGRAM COMPLETE: CPT | Performed by: INTERNAL MEDICINE

## 2018-02-01 NOTE — PROGRESS NOTES
"  Denise Staley  1945  Date of Office Visit: 02/01/2018  Encounter Provider: Harshil Mccartney MD  Place of Service: [facility] CARDIOLOGY      CHIEF COMPLAINT:  Persistent atrial fibrillation, status post direct current cardioversion.  Currently sinus  Essential hypertension      HISTORY OF PRESENT ILLNESS:Dr. De Los Santos,     I had the pleasure of seeing your patient in followup today for Dr. Worthy as he is out.  She is a pleasant 72-year-old female with a medical history of previous paroxysmal atrial fibrillation with movement to persistent atrial fibrillation.  She was amiodarone and Xarelto, and has actually has had recurrence of atrial fibrillation late last year.  She previously underwent evaluation including a recent transthoracic echocardiogram in 01/2018 which revealed her ejection fraction to be normal.  She had mild thickening of the aortic valve but no stenosis.  No other significant valvular heart disease.  She was in atrial fibrillation starting back in November and stated that she felt poorly.  She had fatigue, palpitations and some symptoms of \"just not feeling right\" per her report.  These were moderate in intensity and limiting her from a lifestyle standpoint.  They lasted actually for multiple months until she had her cardioversion in late 01/2018.  She was on amiodarone along with Xarelto at that time.  The cardiovascular was successful.      Since that time she states that she has felt much better.  She has improved symptoms of mild fatigue.  She no longer is feeling the palpitations.  She states she has more energy.      She is maintaining sinus rhythm with her EKG showing a ventricular rate of 63 beats per minute.       She also complains that she will occasionally have chest discomfort.  This is mild, central, and unpredictable.  It comes on both at rest and with exertion.  It is short in duration, typically lasting less than 2-3 minutes.      Review of " Systems   Constitution: Negative for fever, weakness and malaise/fatigue.   HENT: Negative for nosebleeds and sore throat.    Eyes: Negative for blurred vision and double vision.   Cardiovascular: Positive for chest pain. Negative for claudication, palpitations and syncope.   Respiratory: Negative for cough, shortness of breath and snoring.    Endocrine: Negative for cold intolerance, heat intolerance and polydipsia.   Skin: Negative for itching, poor wound healing and rash.   Musculoskeletal: Negative for joint pain, joint swelling, muscle weakness and myalgias.   Gastrointestinal: Negative for abdominal pain, melena, nausea and vomiting.   Neurological: Negative for light-headedness, loss of balance, seizures and vertigo.   Psychiatric/Behavioral: Negative for altered mental status and depression.          Past Medical History:   Diagnosis Date   • Arthritis    • Atrial fibrillation    • Fatigue    • Health care maintenance    • Hyperlipidemia    • Hypertension    • Lower back pain    • Obesity    • Pelvic pain in female    • SOB (shortness of breath) on exertion    • TSH elevation    • Ventricular hypertrophy        The following portions of the patient's history were reviewed and updated as appropriate: Social history , Family history and Surgical history     Current Outpatient Prescriptions on File Prior to Visit   Medication Sig Dispense Refill   • Acetaminophen (TYLENOL ARTHRITIS PAIN PO) Take 2 tablets by mouth As Needed.     • amiodarone (PACERONE) 200 MG tablet TAKE 1 TABLET EVERY DAY 90 tablet 3   • amLODIPine (NORVASC) 10 MG tablet TAKE 1 TABLET EVERY DAY 90 tablet 3   • atorvastatin (LIPITOR) 40 MG tablet TAKE 1 TABLET EVERY DAY AT BEDTIME 90 tablet 3   • benazepril (LOTENSIN) 20 MG tablet TAKE 1 TABLET EVERY DAY 90 tablet 3   • Calcium Carbonate-Vitamin D (CALCIUM + D PO) Take 1 tablet by mouth Daily.     • Cholecalciferol (VITAMIN D) 1000 UNITS tablet Take 1,000 Units by mouth Daily.     • cromolyn  "(OPTICROM) 4 % ophthalmic solution Administer 2 drops to both eyes Daily.     • diclofenac (VOLTAREN) 75 MG EC tablet TAKE 1 TABLET DAILY 90 tablet 3   • fluticasone (FLONASE) 50 MCG/ACT nasal spray 2 sprays into each nostril Daily.     • Loratadine 10 MG capsule Take 1 tablet by mouth Daily.     • Omega-3 Fatty Acids (FISH OIL) 1200 MG capsule delayed-release Take 1 tablet by mouth Daily.     • XARELTO 20 MG tablet TAKE 1 TABLET DAILY 90 tablet 0     No current facility-administered medications on file prior to visit.        No Known Allergies    Vitals:    02/01/18 1301   BP: 136/72   Pulse: 63   Weight: 94.8 kg (209 lb)   Height: 170.2 cm (67\")     Physical Exam   Constitutional: She is oriented to person, place, and time. She appears well-developed and well-nourished.   HENT:   Head: Normocephalic and atraumatic.   Eyes: Conjunctivae and EOM are normal. No scleral icterus.   Neck: Normal range of motion. Neck supple. Normal carotid pulses, no hepatojugular reflux and no JVD present. Carotid bruit is not present. No tracheal deviation present. No thyromegaly present.   Cardiovascular: Normal rate and regular rhythm.  Exam reveals no gallop and no friction rub.    No murmur heard.  Pulses:       Carotid pulses are 2+ on the right side, and 2+ on the left side.       Radial pulses are 2+ on the right side, and 2+ on the left side.        Femoral pulses are 2+ on the right side, and 2+ on the left side.       Dorsalis pedis pulses are 2+ on the right side, and 2+ on the left side.        Posterior tibial pulses are 2+ on the right side, and 2+ on the left side.   Pulmonary/Chest: Breath sounds normal. No respiratory distress. She has no decreased breath sounds. She has no wheezes. She has no rhonchi. She has no rales. She exhibits no tenderness.   Abdominal: Soft. Bowel sounds are normal. She exhibits no distension. There is no tenderness. There is no rebound.   Musculoskeletal: She exhibits no edema or deformity. "   Neurological: She is alert and oriented to person, place, and time. She has normal strength. No sensory deficit.   Skin: No rash noted. No erythema.   Psychiatric: She has a normal mood and affect. Her behavior is normal.           No components found for: CBC  No results found for: CMP  No components found for: LIPID  No results found for: BMP      ECG 12 Lead  Date/Time: 2/1/2018 3:13 PM  Performed by: ROCK CARUSO  Authorized by: ROCK CARUSO   Comparison: compared with previous ECG from 1/8/2018  Comparison to previous ECG: No longer in atrial fibrillation  Rhythm: sinus rhythm  Rate: normal  QRS axis: left  Other findings: PRWP  Clinical impression: non-specific ECG  Comments: Nonspecific T-wave abnormalities diffuse leads          DISCUSSION/SUMMARY  72-year-old female with previous paroxysmal atrial fibrillation which progressed to persistent atrial fibrillation.  She underwent direct current cardioversion secondary to the atrial fibrillation her symptoms of fatigue and just not feeling well.  She is maintaining sinus rhythm and states that she feels much better.  She is tolerating her anticoagulation.  Her blood pressure on my review is well controlled.  She has chest pain that is atypical and I do not think needs ischemic evaluation at this time.      1. Atrial fibrillation; persistent, currently sinus.  Status post direct current cardioversion in 01/2017.  Continue current medical regimen including amiodarone at 200 mg daily long with Xarelto 20 mg daily.  She has a resting heart rate that is slow and will not tolerate beta blockade.  She should have repeat CMP, TSH and possibly even a chest x-ray in six months on her followup with Dr. Worthy.    2. Essential hypertension; at goal, continue current therapy.    3. Chest discomfort; atypical.  I would not recommend ischemic evaluation at this time.      She is to followup with Dr. Worthy in six months.        Atrial Fibrillation and Atrial  Flutter  Assessment  • The patient has persistent atrial fibrillation  • This is non-valvular in etiology  • The patient's CHADS2-VASc score is 3  • A [id number] score of 2 or more is considered a high risk for a thromboembolic event  • Apixaban prescribed    Plan  • Attempt to maintain sinus rhythm  • Continue rivaroxaban for antithrombotic therapy, bleeding issues discussed  • Continue amiodarone for rhythm control

## 2018-02-06 ENCOUNTER — OFFICE VISIT (OUTPATIENT)
Dept: FAMILY MEDICINE CLINIC | Facility: CLINIC | Age: 73
End: 2018-02-06

## 2018-02-06 VITALS
SYSTOLIC BLOOD PRESSURE: 120 MMHG | HEIGHT: 67 IN | HEART RATE: 54 BPM | DIASTOLIC BLOOD PRESSURE: 76 MMHG | WEIGHT: 212.2 LBS | BODY MASS INDEX: 33.3 KG/M2 | OXYGEN SATURATION: 95 % | TEMPERATURE: 97.6 F

## 2018-02-06 DIAGNOSIS — M54.50 CHRONIC LOW BACK PAIN WITHOUT SCIATICA, UNSPECIFIED BACK PAIN LATERALITY: ICD-10-CM

## 2018-02-06 DIAGNOSIS — M19.90 ARTHRITIS: ICD-10-CM

## 2018-02-06 DIAGNOSIS — E78.5 HYPERLIPIDEMIA, UNSPECIFIED HYPERLIPIDEMIA TYPE: ICD-10-CM

## 2018-02-06 DIAGNOSIS — G89.29 CHRONIC LOW BACK PAIN WITHOUT SCIATICA, UNSPECIFIED BACK PAIN LATERALITY: ICD-10-CM

## 2018-02-06 DIAGNOSIS — N18.30 STAGE 3 CHRONIC KIDNEY DISEASE (HCC): ICD-10-CM

## 2018-02-06 DIAGNOSIS — R79.89 INCREASED THYROID STIMULATING HORMONE LEVEL: ICD-10-CM

## 2018-02-06 DIAGNOSIS — I10 ESSENTIAL HYPERTENSION: Primary | ICD-10-CM

## 2018-02-06 DIAGNOSIS — I48.0 PAROXYSMAL ATRIAL FIBRILLATION (HCC): ICD-10-CM

## 2018-02-06 PROCEDURE — 99214 OFFICE O/P EST MOD 30 MIN: CPT | Performed by: INTERNAL MEDICINE

## 2018-02-06 RX ORDER — ATORVASTATIN CALCIUM 20 MG/1
20 TABLET, FILM COATED ORAL DAILY
Qty: 90 TABLET | Refills: 3 | Status: SHIPPED | OUTPATIENT
Start: 2018-02-06 | End: 2019-02-14 | Stop reason: SDUPTHER

## 2018-02-06 NOTE — PROGRESS NOTES
Subjective   Denise Staley is a 72 y.o. female. Patient is here today for follow-up on her paroxysmal atrial fibrillation, hypertension, hyperlipidemia, chronic kidney disease stage III and arthritis and low back pain.  She generally been well and has no new acute problems.  She did have to be cardioverted several months ago because of recurrent fibrillation and remains on Xarelto.  Chief Complaint   Patient presents with   • Hyperlipidemia     HTN- FOLLOW UP LABS          Vitals:    02/06/18 1257   BP: 120/76   Pulse: 54   Temp: 97.6 °F (36.4 °C)   SpO2: 95%     The following portions of the patient's history were reviewed and updated as appropriate: allergies, current medications, past family history, past medical history, past social history, past surgical history and problem list.    Past Medical History:   Diagnosis Date   • Arthritis    • Atrial fibrillation    • Fatigue    • Health care maintenance    • Hyperlipidemia    • Hypertension    • Lower back pain    • Obesity    • Pelvic pain in female    • SOB (shortness of breath) on exertion    • TSH elevation    • Ventricular hypertrophy       No Known Allergies   Social History     Social History   • Marital status:      Spouse name: N/A   • Number of children: N/A   • Years of education: N/A     Occupational History   • Not on file.     Social History Main Topics   • Smoking status: Former Smoker   • Smokeless tobacco: Former User      Comment: no caffeine use   • Alcohol use No   • Drug use: Defer   • Sexual activity: Defer     Other Topics Concern   • Not on file     Social History Narrative        Current Outpatient Prescriptions:   •  Acetaminophen (TYLENOL ARTHRITIS PAIN PO), Take 2 tablets by mouth As Needed., Disp: , Rfl:   •  amiodarone (PACERONE) 200 MG tablet, TAKE 1 TABLET EVERY DAY, Disp: 90 tablet, Rfl: 3  •  amLODIPine (NORVASC) 10 MG tablet, TAKE 1 TABLET EVERY DAY, Disp: 90 tablet, Rfl: 3  •  atorvastatin (LIPITOR) 20 MG tablet,  Take 1 tablet by mouth Daily., Disp: 90 tablet, Rfl: 3  •  benazepril (LOTENSIN) 20 MG tablet, TAKE 1 TABLET EVERY DAY, Disp: 90 tablet, Rfl: 3  •  Calcium Carbonate-Vitamin D (CALCIUM + D PO), Take 1 tablet by mouth Daily., Disp: , Rfl:   •  Cholecalciferol (VITAMIN D) 1000 UNITS tablet, Take 1,000 Units by mouth Daily., Disp: , Rfl:   •  cromolyn (OPTICROM) 4 % ophthalmic solution, Administer 2 drops to both eyes Daily., Disp: , Rfl:   •  diclofenac (VOLTAREN) 75 MG EC tablet, TAKE 1 TABLET DAILY, Disp: 90 tablet, Rfl: 3  •  fluticasone (FLONASE) 50 MCG/ACT nasal spray, 2 sprays into each nostril Daily., Disp: , Rfl:   •  Loratadine 10 MG capsule, Take 1 tablet by mouth Daily., Disp: , Rfl:   •  Omega-3 Fatty Acids (FISH OIL) 1200 MG capsule delayed-release, Take 1 tablet by mouth Daily., Disp: , Rfl:   •  XARELTO 20 MG tablet, TAKE 1 TABLET DAILY, Disp: 90 tablet, Rfl: 0     Objective     History of Present Illness     Review of Systems   Constitutional: Negative.    HENT: Negative.    Eyes: Negative.    Respiratory: Negative.    Cardiovascular: Negative.    Gastrointestinal: Negative.    Endocrine: Negative.    Genitourinary: Negative.    Musculoskeletal: Negative.    Neurological: Negative.    Psychiatric/Behavioral: Negative.        Physical Exam   Constitutional: She is oriented to person, place, and time. She appears well-developed and well-nourished.   Pleasant, cooperative no distress with a blood pressure 120/60   HENT:   Head: Normocephalic and atraumatic.   Eyes: Conjunctivae are normal. Pupils are equal, round, and reactive to light. No scleral icterus.   Neck: Normal range of motion. Neck supple. No thyromegaly present.   Cardiovascular: Normal rate, regular rhythm and normal heart sounds.    Pulmonary/Chest: Effort normal and breath sounds normal. No respiratory distress. She has no wheezes. She has no rales.   Musculoskeletal: Normal range of motion. She exhibits no edema.   Neurological: She is  alert and oriented to person, place, and time.   Skin: Skin is warm and dry.   Psychiatric: She has a normal mood and affect. Her behavior is normal.   Nursing note and vitals reviewed.      ASSESSMENT  CBC was normal.  Lipid panel was quite normal as was TSH and free T4.  CMP has an elevated creatinine that stable at 1.47 and otherwise is normal.  #1-paroxysmal atrial fibrillation, regular cardiac rhythm today  #2-hypertension, excellent control  #3-hyperlipidemia, excellent control  #4-chronic kidney disease stage III, asymptomatic and stable  #5-arthritis, especially with the knees and lower back pain     Problem List Items Addressed This Visit        Cardiovascular and Mediastinum    Atrial fibrillation    Hyperlipidemia    Relevant Medications    atorvastatin (LIPITOR) 20 MG tablet    Hypertension - Primary       Nervous and Auditory    Low back pain       Musculoskeletal and Integument    Arthritis       Genitourinary    Stage 3 chronic kidney disease       Other    RESOLVED: Increased thyroid stimulating hormone level          PLAN  The patient will try and check on a Zostavax immunization.  She will continue her current medicines as now.  I'd like to recheck her in 4 months with CBC, CMP, lipid panel and TSH and free T4    There are no Patient Instructions on file for this visit.  No Follow-up on file.

## 2018-02-22 ENCOUNTER — TRANSCRIBE ORDERS (OUTPATIENT)
Dept: ADMINISTRATIVE | Facility: HOSPITAL | Age: 73
End: 2018-02-22

## 2018-02-22 DIAGNOSIS — Z12.31 VISIT FOR SCREENING MAMMOGRAM: Primary | ICD-10-CM

## 2018-03-07 ENCOUNTER — HOSPITAL ENCOUNTER (OUTPATIENT)
Dept: MAMMOGRAPHY | Facility: HOSPITAL | Age: 73
Discharge: HOME | End: 2018-03-07
Admitting: INTERNAL MEDICINE

## 2018-03-07 DIAGNOSIS — Z12.31 VISIT FOR SCREENING MAMMOGRAM: ICD-10-CM

## 2018-03-07 PROCEDURE — 77067 SCR MAMMO BI INCL CAD: CPT

## 2018-03-07 PROCEDURE — 77063 BREAST TOMOSYNTHESIS BI: CPT

## 2018-04-30 RX ORDER — RIVAROXABAN 20 MG/1
TABLET, FILM COATED ORAL
Qty: 90 TABLET | Refills: 0 | Status: SHIPPED | OUTPATIENT
Start: 2018-04-30 | End: 2018-07-18 | Stop reason: SDUPTHER

## 2018-06-06 LAB
ALBUMIN SERPL-MCNC: 4.6 G/DL (ref 3.5–4.8)
ALBUMIN/GLOB SERPL: 2 {RATIO} (ref 1.2–2.2)
ALP SERPL-CCNC: 69 IU/L (ref 39–117)
ALT SERPL-CCNC: 19 IU/L (ref 0–32)
AMBIG ABBREV CMP14 DEFAULT: NORMAL
AMBIG ABBREV LP DEFAULT: NORMAL
AST SERPL-CCNC: 23 IU/L (ref 0–40)
BASOPHILS # BLD AUTO: 0 X10E3/UL (ref 0–0.2)
BASOPHILS NFR BLD AUTO: 0 %
BILIRUB SERPL-MCNC: 0.5 MG/DL (ref 0–1.2)
BUN SERPL-MCNC: 17 MG/DL (ref 8–27)
BUN/CREAT SERPL: 12 (ref 12–28)
CALCIUM SERPL-MCNC: 9.9 MG/DL (ref 8.7–10.3)
CHLORIDE SERPL-SCNC: 105 MMOL/L (ref 96–106)
CHOLEST SERPL-MCNC: 159 MG/DL (ref 100–199)
CO2 SERPL-SCNC: 24 MMOL/L (ref 18–29)
CREAT SERPL-MCNC: 1.43 MG/DL (ref 0.57–1)
EOSINOPHIL # BLD AUTO: 0.1 X10E3/UL (ref 0–0.4)
EOSINOPHIL NFR BLD AUTO: 1 %
ERYTHROCYTE [DISTWIDTH] IN BLOOD BY AUTOMATED COUNT: 13.7 % (ref 12.3–15.4)
GFR SERPLBLD CREATININE-BSD FMLA CKD-EPI: 36 ML/MIN/1.73
GFR SERPLBLD CREATININE-BSD FMLA CKD-EPI: 42 ML/MIN/1.73
GLOBULIN SER CALC-MCNC: 2.3 G/DL (ref 1.5–4.5)
GLUCOSE SERPL-MCNC: 86 MG/DL (ref 65–99)
HCT VFR BLD AUTO: 42.8 % (ref 34–46.6)
HDLC SERPL-MCNC: 67 MG/DL
HGB BLD-MCNC: 13.9 G/DL (ref 11.1–15.9)
IMM GRANULOCYTES # BLD: 0 X10E3/UL (ref 0–0.1)
IMM GRANULOCYTES NFR BLD: 0 %
LDLC SERPL CALC-MCNC: 77 MG/DL (ref 0–99)
LYMPHOCYTES # BLD AUTO: 1 X10E3/UL (ref 0.7–3.1)
LYMPHOCYTES NFR BLD AUTO: 18 %
MCH RBC QN AUTO: 31.5 PG (ref 26.6–33)
MCHC RBC AUTO-ENTMCNC: 32.5 G/DL (ref 31.5–35.7)
MCV RBC AUTO: 97 FL (ref 79–97)
MONOCYTES # BLD AUTO: 0.5 X10E3/UL (ref 0.1–0.9)
MONOCYTES NFR BLD AUTO: 9 %
NEUTROPHILS # BLD AUTO: 4.1 X10E3/UL (ref 1.4–7)
NEUTROPHILS NFR BLD AUTO: 72 %
PLATELET # BLD AUTO: 344 X10E3/UL (ref 150–379)
POTASSIUM SERPL-SCNC: 4.6 MMOL/L (ref 3.5–5.2)
PROT SERPL-MCNC: 6.9 G/DL (ref 6–8.5)
RBC # BLD AUTO: 4.41 X10E6/UL (ref 3.77–5.28)
SODIUM SERPL-SCNC: 144 MMOL/L (ref 134–144)
T4 FREE SERPL-MCNC: 1.65 NG/DL (ref 0.82–1.77)
TRIGL SERPL-MCNC: 75 MG/DL (ref 0–149)
TSH SERPL DL<=0.005 MIU/L-ACNC: 5.24 UIU/ML (ref 0.45–4.5)
VLDLC SERPL CALC-MCNC: 15 MG/DL (ref 5–40)
WBC # BLD AUTO: 5.7 X10E3/UL (ref 3.4–10.8)

## 2018-06-12 RX ORDER — AMLODIPINE BESYLATE 10 MG/1
TABLET ORAL
Qty: 90 TABLET | Refills: 3 | Status: SHIPPED | OUTPATIENT
Start: 2018-06-12 | End: 2019-05-13 | Stop reason: SDUPTHER

## 2018-06-12 RX ORDER — BENAZEPRIL HYDROCHLORIDE 20 MG/1
TABLET ORAL
Qty: 90 TABLET | Refills: 3 | Status: SHIPPED | OUTPATIENT
Start: 2018-06-12 | End: 2019-05-13 | Stop reason: SDUPTHER

## 2018-06-13 ENCOUNTER — OFFICE VISIT (OUTPATIENT)
Dept: FAMILY MEDICINE CLINIC | Facility: CLINIC | Age: 73
End: 2018-06-13

## 2018-06-13 VITALS
WEIGHT: 210 LBS | DIASTOLIC BLOOD PRESSURE: 74 MMHG | HEART RATE: 54 BPM | RESPIRATION RATE: 18 BRPM | SYSTOLIC BLOOD PRESSURE: 122 MMHG | HEIGHT: 67 IN | OXYGEN SATURATION: 92 % | TEMPERATURE: 97.6 F | BODY MASS INDEX: 32.96 KG/M2

## 2018-06-13 DIAGNOSIS — N18.30 STAGE 3 CHRONIC KIDNEY DISEASE (HCC): ICD-10-CM

## 2018-06-13 DIAGNOSIS — I10 ESSENTIAL HYPERTENSION: Primary | ICD-10-CM

## 2018-06-13 DIAGNOSIS — G89.29 CHRONIC LOW BACK PAIN WITHOUT SCIATICA, UNSPECIFIED BACK PAIN LATERALITY: ICD-10-CM

## 2018-06-13 DIAGNOSIS — E78.5 HYPERLIPIDEMIA, UNSPECIFIED HYPERLIPIDEMIA TYPE: ICD-10-CM

## 2018-06-13 DIAGNOSIS — M19.90 ARTHRITIS: ICD-10-CM

## 2018-06-13 DIAGNOSIS — I48.0 PAROXYSMAL ATRIAL FIBRILLATION (HCC): ICD-10-CM

## 2018-06-13 DIAGNOSIS — R60.9 DEPENDENT EDEMA: ICD-10-CM

## 2018-06-13 DIAGNOSIS — M54.50 CHRONIC LOW BACK PAIN WITHOUT SCIATICA, UNSPECIFIED BACK PAIN LATERALITY: ICD-10-CM

## 2018-06-13 PROCEDURE — 99214 OFFICE O/P EST MOD 30 MIN: CPT | Performed by: INTERNAL MEDICINE

## 2018-06-13 RX ORDER — TRAMADOL HYDROCHLORIDE 50 MG/1
50 TABLET ORAL EVERY 6 HOURS PRN
COMMUNITY
End: 2018-10-17

## 2018-06-13 NOTE — PROGRESS NOTES
Subjective   Denise Staley is a 73 y.o. female. Patient is here today for follow-up on her hypertension, hyperlipidemia and atrial fibrillation.  She also has chronic low back pain and arthritis.  She also has stage III kidney disease and some dependent edema.  Since she saw me last she is had some renal stones and is seeing Dr. Worley.  I don't have any notes concerning that.  She does have a follow-up with the help.  She also was told to get off her diclofenac and is having significant arthritis problems that's not controlled by Tylenol and tramadol.  Chief Complaint   Patient presents with   • Hyperlipidemia   • Hypertension          Vitals:    06/13/18 1255   BP: 122/74   Pulse: 54   Resp: 18   Temp: 97.6 °F (36.4 °C)   SpO2: 92%     The following portions of the patient's history were reviewed and updated as appropriate: allergies, current medications, past family history, past medical history, past social history, past surgical history and problem list.    Past Medical History:   Diagnosis Date   • Arthritis    • Atrial fibrillation    • Fatigue    • Health care maintenance    • Hyperlipidemia    • Hypertension    • Lower back pain    • Obesity    • Pelvic pain in female    • SOB (shortness of breath) on exertion    • TSH elevation    • Ventricular hypertrophy       No Known Allergies   Social History     Social History   • Marital status:      Spouse name: N/A   • Number of children: N/A   • Years of education: N/A     Occupational History   • Not on file.     Social History Main Topics   • Smoking status: Former Smoker   • Smokeless tobacco: Former User      Comment: no caffeine use   • Alcohol use No   • Drug use: Unknown   • Sexual activity: Defer     Other Topics Concern   • Not on file     Social History Narrative   • No narrative on file        Current Outpatient Prescriptions:   •  Acetaminophen (TYLENOL ARTHRITIS PAIN PO), Take 2 tablets by mouth As Needed., Disp: , Rfl:   •  amiodarone  (PACERONE) 200 MG tablet, TAKE 1 TABLET EVERY DAY, Disp: 90 tablet, Rfl: 3  •  amLODIPine (NORVASC) 10 MG tablet, TAKE 1 TABLET EVERY DAY, Disp: 90 tablet, Rfl: 3  •  atorvastatin (LIPITOR) 20 MG tablet, Take 1 tablet by mouth Daily., Disp: 90 tablet, Rfl: 3  •  benazepril (LOTENSIN) 20 MG tablet, TAKE 1 TABLET EVERY DAY, Disp: 90 tablet, Rfl: 3  •  Calcium Carbonate-Vitamin D (CALCIUM + D PO), Take 1 tablet by mouth Daily., Disp: , Rfl:   •  Cholecalciferol (VITAMIN D) 1000 UNITS tablet, Take 1,000 Units by mouth Daily., Disp: , Rfl:   •  cromolyn (OPTICROM) 4 % ophthalmic solution, Administer 2 drops to both eyes Daily., Disp: , Rfl:   •  fluticasone (FLONASE) 50 MCG/ACT nasal spray, 2 sprays into each nostril Daily., Disp: , Rfl:   •  Loratadine 10 MG capsule, Take 1 tablet by mouth Daily., Disp: , Rfl:   •  Omega-3 Fatty Acids (FISH OIL) 1200 MG capsule delayed-release, Take 1 tablet by mouth Daily., Disp: , Rfl:   •  traMADol (ULTRAM) 50 MG tablet, Take 50 mg by mouth Every 6 (Six) Hours As Needed for Moderate Pain ., Disp: , Rfl:   •  XARELTO 20 MG tablet, TAKE 1 TABLET EVERY DAY, Disp: 90 tablet, Rfl: 0     Objective     History of Present Illness     Review of Systems   Constitutional: Negative.    HENT: Negative.    Eyes: Negative.    Respiratory: Negative.    Cardiovascular: Positive for leg swelling.   Gastrointestinal: Negative.    Endocrine: Negative.    Genitourinary: Negative.    Musculoskeletal: Positive for arthralgias.   Skin: Negative.    Neurological: Negative.    Psychiatric/Behavioral: Negative.        Physical Exam   Constitutional: She is oriented to person, place, and time. She appears well-developed and well-nourished.   Pleasant, cooperative and in some pain with having to change positions little blood pressure 130/80   HENT:   Head: Normocephalic and atraumatic.   Eyes: Conjunctivae are normal. Pupils are equal, round, and reactive to light. No scleral icterus.   Neck: Normal range of  motion. Neck supple.   Cardiovascular: Normal rate, regular rhythm and normal heart sounds.    Pulmonary/Chest: Effort normal and breath sounds normal. No respiratory distress. She has no wheezes. She has no rales.   Musculoskeletal: Normal range of motion. She exhibits edema.   Neurological: She is alert and oriented to person, place, and time.   Skin: Skin is warm and dry.   Psychiatric: She has a normal mood and affect. Her behavior is normal.   Nursing note and vitals reviewed.      ASSESSMENT  CBC was normal.  CMP has an elevated but stable creatinine of 1.43, unchanged for the last 3 years with an estimated GFR of 36 and otherwise was normal.  Lipid panel was good with a total cholesterol 159, HDL 67 and LDL of 77 TSH was mildly high but free T4 was quite normal at 1.65  #1-hypertension, controlled  #2-hyperlipidemia, excellent control  #3-history of atrial fibrillation, regular rhythm today  #4-chronic kidney disease stage III, stable  #5-generalized arthritis, symptomatically much worse off the diclofenac which she had been stable on for 3 years  #6-a recent history of renal stones     Problem List Items Addressed This Visit        Cardiovascular and Mediastinum    Atrial fibrillation    Hyperlipidemia    Hypertension - Primary       Nervous and Auditory    Low back pain       Musculoskeletal and Integument    Arthritis       Genitourinary    Stage 3 chronic kidney disease       Other    Dependent edema          PLAN  Since the patient has not responded well to tramadol or Tylenol and her kidney function has been stable, it does not seem unreasonable for her to restart the lower dose diclofenac 75 mg just once a day.  I plan on rechecking the patient in 4 months with a CBC, CMP, lipid panel    There are no Patient Instructions on file for this visit.  Return in about 4 months (around 10/13/2018) for with labs.

## 2018-07-12 ENCOUNTER — TELEPHONE (OUTPATIENT)
Dept: FAMILY MEDICINE CLINIC | Facility: CLINIC | Age: 73
End: 2018-07-12

## 2018-07-12 NOTE — TELEPHONE ENCOUNTER
CALLED AND S/W PT AND ADVISED WHAT DR. HORN SAID. PT VOICED UNDERSTANDING.     ----- Message from Mykel Horn MD sent at 7/11/2018  4:12 PM EDT -----  My suspicion is that it may be related to the amiodarone and I think she should check with cardiologist about that.  It would be okay with me if she decreased her amlodipine to just 5 mg, she can cut the tablet in half and just take the half, and continue on the benazepril as now and continue to monitor her blood pressure        ----- Message -----  From: Nicole Barrera MA  Sent: 7/11/2018   3:41 PM  To: MD DR. YOUNG Gomes,    PLEASE SEE BELOW AND ADVISE. THANK YOU.      ----- Message -----  From: Melanie West  Sent: 7/11/2018   3:28 PM  To: Nicole Barrera MA    PATIENT CALLED AND SAID SHE'S BEEN HAVING LOW BP LAST FEW DAYS AND SHE WENT TO THE ORTHOPEDIC TODAY AND THEY CHECKED IT AND IT WAS /70 PULSE 45. SHE WAS WANTING TO GET A CALL BACK TO SEE IF SHE NEEDS TO ADJUST HER MEDICATION.      PLEASE CALL PT BACK AT:    PT CELL [phone]

## 2018-07-18 RX ORDER — RIVAROXABAN 20 MG/1
TABLET, FILM COATED ORAL
Qty: 90 TABLET | Refills: 3 | Status: SHIPPED | OUTPATIENT
Start: 2018-07-18 | End: 2019-04-05 | Stop reason: SDUPTHER

## 2018-08-15 ENCOUNTER — OFFICE VISIT (OUTPATIENT)
Dept: CARDIOLOGY | Facility: CLINIC | Age: 73
End: 2018-08-15

## 2018-08-15 VITALS
WEIGHT: 203 LBS | HEART RATE: 48 BPM | SYSTOLIC BLOOD PRESSURE: 146 MMHG | BODY MASS INDEX: 31.86 KG/M2 | HEIGHT: 67 IN | DIASTOLIC BLOOD PRESSURE: 76 MMHG

## 2018-08-15 DIAGNOSIS — I10 ESSENTIAL HYPERTENSION: ICD-10-CM

## 2018-08-15 DIAGNOSIS — I48.0 PAROXYSMAL ATRIAL FIBRILLATION (HCC): Primary | ICD-10-CM

## 2018-08-15 PROCEDURE — 93000 ELECTROCARDIOGRAM COMPLETE: CPT | Performed by: INTERNAL MEDICINE

## 2018-08-15 PROCEDURE — 99213 OFFICE O/P EST LOW 20 MIN: CPT | Performed by: INTERNAL MEDICINE

## 2018-08-15 RX ORDER — DICLOFENAC SODIUM 75 MG/1
75 TABLET, DELAYED RELEASE ORAL DAILY
COMMUNITY
End: 2018-12-06 | Stop reason: SDUPTHER

## 2018-08-15 NOTE — PROGRESS NOTES
Subjective:     Encounter Date:08/15/2018      Patient ID: Denise Staley is a 73 y.o. female.    Chief Complaint: PAF, HTN    History of Present Illness    Dear Dr. De Los Santos,     I had the pleasure of seeing Denise Staley in cardiac followup today.  As you well know, she is a aimee 73-year-old woman with history of paroxysmal atrial fibrillation.  She is anticoagulated with rivaroxaban.      She had a cardioversion earlier this year which was successful.      She comes in for a followup visit.  Since I have last seen her, she reports one other episode of probable atrial fibrillation that resolved on its own.  Otherwise, she has been doing quite well.  She does have a little bradycardia but does not have any symptoms of it.  Her blood pressure has been a little elevated, but for the most part has been satisfactory.          Review of Systems   All other systems reviewed and are negative.        ECG 12 Lead  Date/Time: 8/15/2018 12:30 PM  Performed by: BECK JARAMILLO  Authorized by: BECK JARAMILLO   Comparison: compared with previous ECG   Similar to previous ECG  Rhythm: sinus bradycardia  BPM: 48  QRS axis: left                 Objective:     Physical Exam   Constitutional: She is oriented to person, place, and time. She appears well-developed and well-nourished.   HENT:   Head: Normocephalic and atraumatic.   Neck: Normal range of motion. Neck supple.   Cardiovascular: Normal rate, regular rhythm and normal heart sounds.    Pulmonary/Chest: Effort normal and breath sounds normal.   Abdominal: Soft. Bowel sounds are normal.   Musculoskeletal: Normal range of motion.   Neurological: She is alert and oriented to person, place, and time.   Skin: Skin is warm and dry.   Psychiatric: She has a normal mood and affect. Her behavior is normal. Thought content normal.   Vitals reviewed.      Lab Review:       Assessment:          Diagnosis Plan   1. Paroxysmal atrial fibrillation (CMS/HCC)     2. Essential hypertension             Plan:       It was a pleasure to see your patient in cardiac followup today. She is doing quite well from the standpoint of her atrial fibrillation. She is satisfied with her current regimen. She takes an extra amiodarone if she feels like she might be having an episode. I have recommended no other changes at this time. She will see me again in 6 months or sooner if symptoms warrant.         Atrial Fibrillation and Atrial Flutter  Assessment  • The patient has paroxysmal atrial fibrillation  • This is non-valvular in etiology  • The patient's CHADS2-VASc score is 3  • A [id number] score of 2 or more is considered a high risk for a thromboembolic event  • Apixaban prescribed    Plan  • Attempt to maintain sinus rhythm  • Continue rivaroxaban for antithrombotic therapy, bleeding issues discussed  • Continue amiodarone for rhythm control

## 2018-08-20 RX ORDER — AMIODARONE HYDROCHLORIDE 200 MG/1
200 TABLET ORAL DAILY
Qty: 90 TABLET | Refills: 3 | Status: SHIPPED | OUTPATIENT
Start: 2018-08-20 | End: 2019-08-13 | Stop reason: SDUPTHER

## 2018-10-10 LAB
ALBUMIN SERPL-MCNC: 4.5 G/DL (ref 3.5–4.8)
ALBUMIN/GLOB SERPL: 1.7 {RATIO} (ref 1.2–2.2)
ALP SERPL-CCNC: 81 IU/L (ref 39–117)
ALT SERPL-CCNC: 25 IU/L (ref 0–32)
AMBIG ABBREV CMP14 DEFAULT: NORMAL
AMBIG ABBREV LP DEFAULT: NORMAL
AST SERPL-CCNC: 26 IU/L (ref 0–40)
BASOPHILS # BLD AUTO: 0 X10E3/UL (ref 0–0.2)
BASOPHILS NFR BLD AUTO: 0 %
BILIRUB SERPL-MCNC: 0.7 MG/DL (ref 0–1.2)
BUN SERPL-MCNC: 24 MG/DL (ref 8–27)
BUN/CREAT SERPL: 16 (ref 12–28)
CALCIUM SERPL-MCNC: 10.2 MG/DL (ref 8.7–10.3)
CHLORIDE SERPL-SCNC: 103 MMOL/L (ref 96–106)
CHOLEST SERPL-MCNC: 189 MG/DL (ref 100–199)
CO2 SERPL-SCNC: 20 MMOL/L (ref 20–29)
CREAT SERPL-MCNC: 1.54 MG/DL (ref 0.57–1)
EOSINOPHIL # BLD AUTO: 0.1 X10E3/UL (ref 0–0.4)
EOSINOPHIL NFR BLD AUTO: 1 %
ERYTHROCYTE [DISTWIDTH] IN BLOOD BY AUTOMATED COUNT: 13.6 % (ref 12.3–15.4)
GLOBULIN SER CALC-MCNC: 2.6 G/DL (ref 1.5–4.5)
GLUCOSE SERPL-MCNC: 79 MG/DL (ref 65–99)
HCT VFR BLD AUTO: 46.3 % (ref 34–46.6)
HDLC SERPL-MCNC: 76 MG/DL
HGB BLD-MCNC: 15.5 G/DL (ref 11.1–15.9)
IMM GRANULOCYTES # BLD: 0 X10E3/UL (ref 0–0.1)
IMM GRANULOCYTES NFR BLD: 0 %
LDLC SERPL CALC-MCNC: 95 MG/DL (ref 0–99)
LYMPHOCYTES # BLD AUTO: 1.1 X10E3/UL (ref 0.7–3.1)
LYMPHOCYTES NFR BLD AUTO: 15 %
MCH RBC QN AUTO: 33.3 PG (ref 26.6–33)
MCHC RBC AUTO-ENTMCNC: 33.5 G/DL (ref 31.5–35.7)
MCV RBC AUTO: 100 FL (ref 79–97)
MONOCYTES # BLD AUTO: 0.8 X10E3/UL (ref 0.1–0.9)
MONOCYTES NFR BLD AUTO: 11 %
NEUTROPHILS # BLD AUTO: 5.5 X10E3/UL (ref 1.4–7)
NEUTROPHILS NFR BLD AUTO: 73 %
PLATELET # BLD AUTO: 373 X10E3/UL (ref 150–379)
POTASSIUM SERPL-SCNC: 4.8 MMOL/L (ref 3.5–5.2)
PROT SERPL-MCNC: 7.1 G/DL (ref 6–8.5)
RBC # BLD AUTO: 4.65 X10E6/UL (ref 3.77–5.28)
SODIUM SERPL-SCNC: 142 MMOL/L (ref 134–144)
TRIGL SERPL-MCNC: 92 MG/DL (ref 0–149)
VLDLC SERPL CALC-MCNC: 18 MG/DL (ref 5–40)
WBC # BLD AUTO: 7.5 X10E3/UL (ref 3.4–10.8)

## 2018-10-17 ENCOUNTER — OFFICE VISIT (OUTPATIENT)
Dept: FAMILY MEDICINE CLINIC | Facility: CLINIC | Age: 73
End: 2018-10-17

## 2018-10-17 VITALS
DIASTOLIC BLOOD PRESSURE: 74 MMHG | SYSTOLIC BLOOD PRESSURE: 122 MMHG | OXYGEN SATURATION: 95 % | WEIGHT: 210.2 LBS | HEART RATE: 53 BPM | HEIGHT: 67 IN | TEMPERATURE: 98.4 F | BODY MASS INDEX: 32.99 KG/M2

## 2018-10-17 DIAGNOSIS — M19.90 ARTHRITIS: ICD-10-CM

## 2018-10-17 DIAGNOSIS — N18.30 STAGE 3 CHRONIC KIDNEY DISEASE (HCC): ICD-10-CM

## 2018-10-17 DIAGNOSIS — E78.5 HYPERLIPIDEMIA, UNSPECIFIED HYPERLIPIDEMIA TYPE: ICD-10-CM

## 2018-10-17 DIAGNOSIS — I48.0 PAROXYSMAL ATRIAL FIBRILLATION (HCC): ICD-10-CM

## 2018-10-17 DIAGNOSIS — R60.9 DEPENDENT EDEMA: ICD-10-CM

## 2018-10-17 DIAGNOSIS — I10 ESSENTIAL HYPERTENSION: Primary | ICD-10-CM

## 2018-10-17 PROCEDURE — 99214 OFFICE O/P EST MOD 30 MIN: CPT | Performed by: INTERNAL MEDICINE

## 2018-10-17 NOTE — PROGRESS NOTES
Subjective   Denise Staley is a 73 y.o. female. Patient is here today for follow-up on her hypertension, hyperlipidemia, paroxysmal atrial fibrillation, arthritis and chronic kidney disease stage III.  Her knees are doing better with injections and she is using diclofenac twice a day.  She does get occasional bouts of fibrillation but is tolerating Xarelto.  She is on amiodarone to maintain her current sinus rhythm.  She's had no chest pain, shortness of breath, edema or myalgias  Chief Complaint   Patient presents with   • Hyperlipidemia     lab follow up           Vitals:    10/17/18 1253   BP: 122/74   Pulse: 53   Temp: 98.4 °F (36.9 °C)   SpO2: 95%     The following portions of the patient's history were reviewed and updated as appropriate: allergies, current medications, past family history, past medical history, past social history, past surgical history and problem list.    Past Medical History:   Diagnosis Date   • Arthritis    • Atrial fibrillation (CMS/HCC)    • Fatigue    • Health care maintenance    • Hyperlipidemia    • Hypertension    • Lower back pain    • Obesity    • Pelvic pain in female    • SOB (shortness of breath) on exertion    • TSH elevation    • Ventricular hypertrophy       No Known Allergies   Social History     Social History   • Marital status:      Spouse name: N/A   • Number of children: N/A   • Years of education: N/A     Occupational History   • Not on file.     Social History Main Topics   • Smoking status: Former Smoker   • Smokeless tobacco: Former User      Comment: no caffeine use   • Alcohol use No   • Drug use: Unknown   • Sexual activity: Defer     Other Topics Concern   • Not on file     Social History Narrative   • No narrative on file        Current Outpatient Prescriptions:   •  Acetaminophen (TYLENOL ARTHRITIS PAIN PO), Take 2 tablets by mouth As Needed., Disp: , Rfl:   •  amiodarone (PACERONE) 200 MG tablet, Take 1 tablet by mouth Daily., Disp: 90 tablet,  Rfl: 3  •  amLODIPine (NORVASC) 10 MG tablet, TAKE 1 TABLET EVERY DAY, Disp: 90 tablet, Rfl: 3  •  atorvastatin (LIPITOR) 20 MG tablet, Take 1 tablet by mouth Daily., Disp: 90 tablet, Rfl: 3  •  benazepril (LOTENSIN) 20 MG tablet, TAKE 1 TABLET EVERY DAY, Disp: 90 tablet, Rfl: 3  •  Calcium Carbonate-Vitamin D (CALCIUM + D PO), Take 1 tablet by mouth Daily., Disp: , Rfl:   •  Cholecalciferol (VITAMIN D) 1000 UNITS tablet, Take 1,000 Units by mouth Daily., Disp: , Rfl:   •  diclofenac (VOLTAREN) 75 MG EC tablet, Take 75 mg by mouth Daily., Disp: , Rfl:   •  fluticasone (FLONASE) 50 MCG/ACT nasal spray, 2 sprays into each nostril Daily., Disp: , Rfl:   •  Loratadine 10 MG capsule, Take 1 tablet by mouth Daily., Disp: , Rfl:   •  XARELTO 20 MG tablet, TAKE 1 TABLET EVERY DAY, Disp: 90 tablet, Rfl: 3     Objective     History of Present Illness     Review of Systems   Constitutional: Negative.    HENT: Negative.    Eyes: Negative.    Respiratory: Negative.    Cardiovascular: Negative.    Gastrointestinal: Negative.    Genitourinary: Negative.    Musculoskeletal: Negative.    Skin: Negative.    Neurological: Negative.    Psychiatric/Behavioral: Negative.        Physical Exam   Constitutional: She is oriented to person, place, and time. She appears well-developed and well-nourished.   Pleasant, cooperative no distress blood pressure 130/80   HENT:   Head: Normocephalic and atraumatic.   Eyes: Pupils are equal, round, and reactive to light. Conjunctivae are normal. No scleral icterus.   Neck: Normal range of motion. Neck supple.   Cardiovascular: Normal rate, regular rhythm and normal heart sounds.    Pulmonary/Chest: Effort normal and breath sounds normal. No respiratory distress. She has no wheezes. She has no rales.   Musculoskeletal: Normal range of motion. She exhibits no edema.   Neurological: She is alert and oriented to person, place, and time.   Skin: Skin is warm and dry.   Psychiatric: She has a normal mood and  affect. Her behavior is normal.   Nursing note and vitals reviewed.      ASSESSMENT  CBC is normal.  CMP has an elevated but stable creatinine of 1.54 and is otherwise normal.  Lipid panel is under good control with total cholesterol 189, HDL 76, LDL 95  #1-hypertension, well controlled  #2-hyperlipidemia, generally good control on medication  #3-paroxysmal atrial fibrillation, currently sinus rhythm  #4-chronic kidney disease stage III, asymptomatic and stable  #5-arthritis, especially in the knees, controlled on diclofenac     Problem List Items Addressed This Visit        Cardiovascular and Mediastinum    Atrial fibrillation (CMS/HCC)    Hyperlipidemia    Hypertension - Primary       Musculoskeletal and Integument    Arthritis       Genitourinary    Stage 3 chronic kidney disease (CMS/HCC)       Other    Dependent edema          PLAN  the patient has received a flu shot.  I recommended the shingles vaccinations.  She will continue current medicines as now and I like to recheck her in 4 months with a CBC, CMP, lipid panel, TSH    There are no Patient Instructions on file for this visit.  Return in about 4 months (around 2/17/2019) for with labs.

## 2018-12-06 RX ORDER — DICLOFENAC SODIUM 75 MG/1
TABLET, DELAYED RELEASE ORAL
Qty: 90 TABLET | Refills: 3 | Status: SHIPPED | OUTPATIENT
Start: 2018-12-06 | End: 2020-01-02

## 2019-02-06 LAB
ALBUMIN SERPL-MCNC: 4.5 G/DL (ref 3.5–4.8)
ALBUMIN/GLOB SERPL: 2 {RATIO} (ref 1.2–2.2)
ALP SERPL-CCNC: 87 IU/L (ref 39–117)
ALT SERPL-CCNC: 29 IU/L (ref 0–32)
AMBIG ABBREV CMP14 DEFAULT: NORMAL
AMBIG ABBREV LP DEFAULT: NORMAL
AST SERPL-CCNC: 28 IU/L (ref 0–40)
BASOPHILS # BLD AUTO: 0 X10E3/UL (ref 0–0.2)
BASOPHILS NFR BLD AUTO: 0 %
BILIRUB SERPL-MCNC: 0.6 MG/DL (ref 0–1.2)
BUN SERPL-MCNC: 18 MG/DL (ref 8–27)
BUN/CREAT SERPL: 15 (ref 12–28)
CALCIUM SERPL-MCNC: 10.3 MG/DL (ref 8.7–10.3)
CHLORIDE SERPL-SCNC: 106 MMOL/L (ref 96–106)
CHOLEST SERPL-MCNC: 182 MG/DL (ref 100–199)
CO2 SERPL-SCNC: 23 MMOL/L (ref 20–29)
CREAT SERPL-MCNC: 1.21 MG/DL (ref 0.57–1)
EOSINOPHIL # BLD AUTO: 0.1 X10E3/UL (ref 0–0.4)
EOSINOPHIL NFR BLD AUTO: 1 %
ERYTHROCYTE [DISTWIDTH] IN BLOOD BY AUTOMATED COUNT: 13.5 % (ref 12.3–15.4)
GLOBULIN SER CALC-MCNC: 2.2 G/DL (ref 1.5–4.5)
GLUCOSE SERPL-MCNC: 86 MG/DL (ref 65–99)
HCT VFR BLD AUTO: 44.2 % (ref 34–46.6)
HDLC SERPL-MCNC: 62 MG/DL
HGB BLD-MCNC: 14.7 G/DL (ref 11.1–15.9)
IMM GRANULOCYTES # BLD AUTO: 0 X10E3/UL (ref 0–0.1)
IMM GRANULOCYTES NFR BLD AUTO: 0 %
LDLC SERPL CALC-MCNC: 97 MG/DL (ref 0–99)
LYMPHOCYTES # BLD AUTO: 1.3 X10E3/UL (ref 0.7–3.1)
LYMPHOCYTES NFR BLD AUTO: 16 %
MCH RBC QN AUTO: 32.6 PG (ref 26.6–33)
MCHC RBC AUTO-ENTMCNC: 33.3 G/DL (ref 31.5–35.7)
MCV RBC AUTO: 98 FL (ref 79–97)
MONOCYTES # BLD AUTO: 0.6 X10E3/UL (ref 0.1–0.9)
MONOCYTES NFR BLD AUTO: 7 %
NEUTROPHILS # BLD AUTO: 6.2 X10E3/UL (ref 1.4–7)
NEUTROPHILS NFR BLD AUTO: 76 %
PLATELET # BLD AUTO: 376 X10E3/UL (ref 150–379)
POTASSIUM SERPL-SCNC: 4.6 MMOL/L (ref 3.5–5.2)
PROT SERPL-MCNC: 6.7 G/DL (ref 6–8.5)
RBC # BLD AUTO: 4.51 X10E6/UL (ref 3.77–5.28)
SODIUM SERPL-SCNC: 144 MMOL/L (ref 134–144)
T4 FREE SERPL-MCNC: 1.5 NG/DL (ref 0.82–1.77)
TRIGL SERPL-MCNC: 117 MG/DL (ref 0–149)
TSH SERPL DL<=0.005 MIU/L-ACNC: 6.91 UIU/ML (ref 0.45–4.5)
VLDLC SERPL CALC-MCNC: 23 MG/DL (ref 5–40)
WBC # BLD AUTO: 8.2 X10E3/UL (ref 3.4–10.8)

## 2019-02-14 ENCOUNTER — OFFICE VISIT (OUTPATIENT)
Dept: FAMILY MEDICINE CLINIC | Facility: CLINIC | Age: 74
End: 2019-02-14

## 2019-02-14 VITALS
OXYGEN SATURATION: 98 % | SYSTOLIC BLOOD PRESSURE: 124 MMHG | HEART RATE: 56 BPM | RESPIRATION RATE: 16 BRPM | TEMPERATURE: 98 F | BODY MASS INDEX: 33.74 KG/M2 | WEIGHT: 215 LBS | HEIGHT: 67 IN | DIASTOLIC BLOOD PRESSURE: 70 MMHG

## 2019-02-14 DIAGNOSIS — I48.0 PAROXYSMAL ATRIAL FIBRILLATION (HCC): ICD-10-CM

## 2019-02-14 DIAGNOSIS — N18.30 STAGE 3 CHRONIC KIDNEY DISEASE (HCC): ICD-10-CM

## 2019-02-14 DIAGNOSIS — I10 ESSENTIAL HYPERTENSION: Primary | ICD-10-CM

## 2019-02-14 DIAGNOSIS — R79.89 TSH ELEVATION: ICD-10-CM

## 2019-02-14 DIAGNOSIS — R60.9 DEPENDENT EDEMA: ICD-10-CM

## 2019-02-14 DIAGNOSIS — E78.5 HYPERLIPIDEMIA, UNSPECIFIED HYPERLIPIDEMIA TYPE: ICD-10-CM

## 2019-02-14 DIAGNOSIS — M19.90 ARTHRITIS: ICD-10-CM

## 2019-02-14 PROCEDURE — 99214 OFFICE O/P EST MOD 30 MIN: CPT | Performed by: INTERNAL MEDICINE

## 2019-02-14 RX ORDER — ATORVASTATIN CALCIUM 20 MG/1
20 TABLET, FILM COATED ORAL DAILY
Qty: 90 TABLET | Refills: 3 | Status: SHIPPED | OUTPATIENT
Start: 2019-02-14 | End: 2020-02-26

## 2019-02-14 NOTE — PROGRESS NOTES
Subjective   Denise Staley is a 73 y.o. female. Patient is here today for follow-up on her paroxysmal atrial fibrillation, hypertension, hyperlipidemia, arthritis in her back and knees, chronic kidney disease stage III and elevated TSH.  Clinically she is stable and feels okay.  She's not had any atrial fibrillation.  She's had no unusual bruising or bleeding.  She gets steroids shots and cartilage shots in her knees and is avoiding operations.  Chief Complaint   Patient presents with   • Hypertension   • Hyperlipidemia          Vitals:    02/14/19 1254   BP: 124/70   Pulse: 56   Resp: 16   Temp: 98 °F (36.7 °C)   SpO2: 98%     The following portions of the patient's history were reviewed and updated as appropriate: allergies, current medications, past family history, past medical history, past social history, past surgical history and problem list.    Past Medical History:   Diagnosis Date   • Arthritis    • Atrial fibrillation (CMS/HCC)    • Fatigue    • Health care maintenance    • Hyperlipidemia    • Hypertension    • Lower back pain    • Obesity    • Pelvic pain in female    • SOB (shortness of breath) on exertion    • TSH elevation    • Ventricular hypertrophy       No Known Allergies   Social History     Socioeconomic History   • Marital status:      Spouse name: Not on file   • Number of children: Not on file   • Years of education: Not on file   • Highest education level: Not on file   Social Needs   • Financial resource strain: Not on file   • Food insecurity - worry: Not on file   • Food insecurity - inability: Not on file   • Transportation needs - medical: Not on file   • Transportation needs - non-medical: Not on file   Occupational History   • Not on file   Tobacco Use   • Smoking status: Former Smoker   • Smokeless tobacco: Former User   • Tobacco comment: no caffeine use   Substance and Sexual Activity   • Alcohol use: No   • Drug use: Defer   • Sexual activity: Defer   Other Topics  Concern   • Not on file   Social History Narrative   • Not on file        Current Outpatient Medications:   •  Acetaminophen (TYLENOL ARTHRITIS PAIN PO), Take 2 tablets by mouth As Needed., Disp: , Rfl:   •  amiodarone (PACERONE) 200 MG tablet, Take 1 tablet by mouth Daily., Disp: 90 tablet, Rfl: 3  •  amLODIPine (NORVASC) 10 MG tablet, TAKE 1 TABLET EVERY DAY, Disp: 90 tablet, Rfl: 3  •  atorvastatin (LIPITOR) 20 MG tablet, Take 1 tablet by mouth Daily., Disp: 90 tablet, Rfl: 3  •  benazepril (LOTENSIN) 20 MG tablet, TAKE 1 TABLET EVERY DAY, Disp: 90 tablet, Rfl: 3  •  Calcium Carbonate-Vitamin D (CALCIUM + D PO), Take 1 tablet by mouth Daily., Disp: , Rfl:   •  Cholecalciferol (VITAMIN D) 1000 UNITS tablet, Take 1,000 Units by mouth Daily., Disp: , Rfl:   •  diclofenac (VOLTAREN) 75 MG EC tablet, TAKE 1 TABLET EVERY DAY, Disp: 90 tablet, Rfl: 3  •  fluticasone (FLONASE) 50 MCG/ACT nasal spray, 2 sprays into each nostril Daily., Disp: , Rfl:   •  Loratadine 10 MG capsule, Take 1 tablet by mouth Daily., Disp: , Rfl:   •  XARELTO 20 MG tablet, TAKE 1 TABLET EVERY DAY, Disp: 90 tablet, Rfl: 3     Objective     History of Present Illness     Review of Systems   Constitutional: Negative.    HENT: Negative.    Eyes: Negative.    Respiratory: Negative.    Cardiovascular: Negative.    Gastrointestinal: Negative.    Genitourinary: Negative.    Musculoskeletal: Positive for arthralgias and back pain.   Skin: Negative.    Neurological: Negative.    Psychiatric/Behavioral: Negative.        Physical Exam   Constitutional: She is oriented to person, place, and time. She appears well-developed and well-nourished.   Pleasant, cooperative no distress, blood pressure 120/80   HENT:   Head: Normocephalic and atraumatic.   Eyes: Conjunctivae are normal. Pupils are equal, round, and reactive to light. No scleral icterus.   Neck: Normal range of motion. Neck supple. No thyromegaly present.   Cardiovascular: Normal rate, regular rhythm  and normal heart sounds.   Pulmonary/Chest: Effort normal and breath sounds normal. No respiratory distress. She has no wheezes. She has no rales.   Musculoskeletal: Normal range of motion. She exhibits no edema.   Neurological: She is alert and oriented to person, place, and time.   Skin: Skin is warm and dry.   Psychiatric: She has a normal mood and affect. Her behavior is normal.   Nursing note and vitals reviewed.      ASSESSMENT  CBC is normal.  CMP is stable with an elevated but stable and improved creatinine of 1.21 and otherwise values were fine.  TSH remains slightly elevated but stable and free T4 is quite normal at 1.5.  #1-history of paroxysmal atrial fibrillation, regular cardiac rhythm today  #2-hyperlipidemia, well controlled  #3-hypertension, well controlled on medication  #4-chronic kidney disease stage III, stable and asymptomatic  #5-arthritic complaints in the back and knees, stable     Problem List Items Addressed This Visit        Cardiovascular and Mediastinum    Atrial fibrillation (CMS/HCC)    Hyperlipidemia    Relevant Medications    atorvastatin (LIPITOR) 20 MG tablet    Hypertension - Primary       Musculoskeletal and Integument    Arthritis       Genitourinary    Stage 3 chronic kidney disease (CMS/HCC)       Other    Dependent edema    TSH elevation          PLAN  I recommended a Tdap immunization and the patient will continue current medicines as now.  I'll recheck her in 4 months with a CBC, CMP, lipid panel, TSH and free T4    There are no Patient Instructions on file for this visit.  Return in about 6 months (around 8/14/2019) for with labs.

## 2019-03-06 ENCOUNTER — HOSPITAL ENCOUNTER (OUTPATIENT)
Dept: GENERAL RADIOLOGY | Facility: HOSPITAL | Age: 74
Discharge: HOME | End: 2019-03-06
Admitting: PHYSICIAN ASSISTANT

## 2019-03-06 ENCOUNTER — OFFICE VISIT (OUTPATIENT)
Dept: CARDIOLOGY | Facility: CLINIC | Age: 74
End: 2019-03-06

## 2019-03-06 VITALS
SYSTOLIC BLOOD PRESSURE: 122 MMHG | DIASTOLIC BLOOD PRESSURE: 68 MMHG | WEIGHT: 217 LBS | BODY MASS INDEX: 34.06 KG/M2 | HEIGHT: 67 IN | OXYGEN SATURATION: 98 % | HEART RATE: 47 BPM

## 2019-03-06 DIAGNOSIS — Z79.899 ON AMIODARONE THERAPY: ICD-10-CM

## 2019-03-06 DIAGNOSIS — I48.0 PAROXYSMAL ATRIAL FIBRILLATION (HCC): ICD-10-CM

## 2019-03-06 DIAGNOSIS — I48.0 PAROXYSMAL ATRIAL FIBRILLATION (HCC): Primary | ICD-10-CM

## 2019-03-06 PROCEDURE — 99213 OFFICE O/P EST LOW 20 MIN: CPT | Performed by: PHYSICIAN ASSISTANT

## 2019-03-06 PROCEDURE — 71046 X-RAY EXAM CHEST 2 VIEWS: CPT

## 2019-03-06 PROCEDURE — 93000 ELECTROCARDIOGRAM COMPLETE: CPT | Performed by: PHYSICIAN ASSISTANT

## 2019-03-06 NOTE — PROGRESS NOTES
Date of Office Visit: 2019  Encounter Provider: IRVING Up  Place of Service: [facility] CARDIOLOGY  Patient Name: Denise Staley  :1945    Chief Complaint   Patient presents with   • Atrial Fibrillation     6 month follow up   • Hypertension   :     HPI: Denise Staley is a 73 y.o. female who presents today for follow-up.  Old records have been obtained and reviewed by me.  She is a patient of Dr. Christie with a past cardiac history significant for paroxysmal atrial fibrillation.  She had been maintained in sinus rhythm on amiodarone and anticoagulated on Xarelto.  She did have an episode of atrial fibrillation that persisted and she felt terrible, and in 2018 she underwent a successful cardioversion.  She maintained sinus rhythm after that cardioversion.  She was last in our office to see Dr. Worthy on 8/15/2018.  At that visit she was doing well and is still in sinus rhythm.  She was taking an extra amiodarone if she felt she was having an episode of atrial fibrillation.  Dr. Worthy agreed with this plan, and she is here today for six-month follow-up.  She did have a free T4 and a CMP checked in February of this year, these were unremarkable.  She has not had a chest x-ray in quite some time.   Over the past year she has been doing well.  She denies any chest pain, worsening shortness of breath, palpitations, edema, dizziness, or syncope.  She thinks that she had one episode in atrial fibrillation around the holidays.  She took an extra amiodarone and it resolved on its own.  She gave up alcohol, and she is back to water aerobics 3 days a week.  She states that her arthritis is much better when she is doing water aerobics on a regular basis.  She thinks that the time that she had her episode of atrial fibrillation around the holidays, she may have had a few glasses of wine.    Past Medical History:   Diagnosis Date   • Arthritis    • Atrial fibrillation  (CMS/[facility])    • Fatigue    • Health care maintenance    • Hyperlipidemia    • Hypertension    • Lower back pain    • Obesity    • Pelvic pain in female    • SOB (shortness of breath) on exertion    • TSH elevation    • Ventricular hypertrophy        Past Surgical History:   Procedure Laterality Date   • CARDIOVERSION      MULTIPLE   • TONSILLECTOMY         Social History     Socioeconomic History   • Marital status:      Spouse name: Not on file   • Number of children: Not on file   • Years of education: Not on file   • Highest education level: Not on file   Social Needs   • Financial resource strain: Not on file   • Food insecurity - worry: Not on file   • Food insecurity - inability: Not on file   • Transportation needs - medical: Not on file   • Transportation needs - non-medical: Not on file   Occupational History   • Not on file   Tobacco Use   • Smoking status: Former Smoker   • Smokeless tobacco: Former User   • Tobacco comment: no caffeine use   Substance and Sexual Activity   • Alcohol use: No   • Drug use: Defer   • Sexual activity: Defer   Other Topics Concern   • Not on file   Social History Narrative   • Not on file       Family History   Problem Relation Age of Onset   • Lung cancer Mother 75   • Ovarian cancer Sister         60's   • Breast cancer Maternal Aunt         70's   • No Known Problems Father    • No Known Problems Maternal Grandmother    • No Known Problems Maternal Grandfather    • No Known Problems Paternal Grandmother    • No Known Problems Paternal Grandfather        Review of Systems   Constitution: Negative for chills, fever and malaise/fatigue.   Cardiovascular: Positive for dyspnea on exertion. Negative for chest pain, leg swelling, near-syncope, orthopnea, palpitations, paroxysmal nocturnal dyspnea and syncope.   Respiratory: Negative for cough and shortness of breath.    Musculoskeletal: Positive for joint pain. Negative for joint swelling and myalgias.   Gastrointestinal:  "Negative for abdominal pain, diarrhea, melena, nausea and vomiting.   Genitourinary: Negative for frequency and hematuria.   Neurological: Negative for light-headedness, numbness, paresthesias and seizures.   Allergic/Immunologic: Negative.    All other systems reviewed and are negative.      No Known Allergies      Current Outpatient Medications:   •  Acetaminophen (TYLENOL ARTHRITIS PAIN PO), Take 2 tablets by mouth As Needed., Disp: , Rfl:   •  amiodarone (PACERONE) 200 MG tablet, Take 1 tablet by mouth Daily., Disp: 90 tablet, Rfl: 3  •  amLODIPine (NORVASC) 10 MG tablet, TAKE 1 TABLET EVERY DAY, Disp: 90 tablet, Rfl: 3  •  atorvastatin (LIPITOR) 20 MG tablet, Take 1 tablet by mouth Daily., Disp: 90 tablet, Rfl: 3  •  benazepril (LOTENSIN) 20 MG tablet, TAKE 1 TABLET EVERY DAY, Disp: 90 tablet, Rfl: 3  •  Calcium Carbonate-Vitamin D (CALCIUM + D PO), Take 1 tablet by mouth Daily., Disp: , Rfl:   •  Cholecalciferol (VITAMIN D) 1000 UNITS tablet, Take 1,000 Units by mouth Daily., Disp: , Rfl:   •  diclofenac (VOLTAREN) 75 MG EC tablet, TAKE 1 TABLET EVERY DAY, Disp: 90 tablet, Rfl: 3  •  fluticasone (FLONASE) 50 MCG/ACT nasal spray, 2 sprays into each nostril Daily., Disp: , Rfl:   •  Loratadine 10 MG capsule, Take 1 tablet by mouth Daily., Disp: , Rfl:   •  XARELTO 20 MG tablet, TAKE 1 TABLET EVERY DAY, Disp: 90 tablet, Rfl: 3      Objective:     Vitals:    03/06/19 1308 03/06/19 1318   BP: 112/70 122/68   BP Location: Right arm Left arm   Pulse: (!) 47    SpO2: 98%    Weight: 98.4 kg (217 lb)    Height: 170.2 cm (67\")      Body mass index is 33.99 kg/m².    PHYSICAL EXAM:    Physical Exam   Constitutional: She is oriented to person, place, and time. She appears well-developed and well-nourished. No distress.   HENT:   Head: Normocephalic and atraumatic.   Eyes: Pupils are equal, round, and reactive to light.   Neck: No JVD present. No thyromegaly present.   Cardiovascular: Normal rate, regular rhythm and intact " distal pulses.   Murmur heard.   Harsh midsystolic murmur is present with a grade of 1/6 at the upper right sternal border radiating to the neck.  Pulmonary/Chest: Effort normal and breath sounds normal. No respiratory distress.   Abdominal: Soft. Bowel sounds are normal. She exhibits no distension. There is no splenomegaly or hepatomegaly. There is no tenderness.   Musculoskeletal: Normal range of motion. She exhibits no edema.   Neurological: She is alert and oriented to person, place, and time.   Skin: Skin is warm and dry. She is not diaphoretic. No erythema.   Psychiatric: She has a normal mood and affect. Her behavior is normal. Judgment normal.         ECG 12 Lead  Date/Time: 3/6/2019 1:28 PM  Performed by: Monica Rawls PA  Authorized by: Monica Rawls PA   Comparison: compared with previous ECG from 8/15/2018  Similar to previous ECG  Rhythm: sinus bradycardia  BPM: 46    Clinical impression: abnormal EKG  Comments: Indication: Paroxysmal atrial fibrillation.              Assessment:       Diagnosis Plan   1. Paroxysmal atrial fibrillation (CMS/HCC)  XR Chest 2 View    ECG 12 Lead   2. On amiodarone therapy  XR Chest 2 View     Orders Placed This Encounter   Procedures   • XR Chest 2 View     Standing Status:   Future     Standing Expiration Date:   3/6/2020     Order Specific Question:   Reason for Exam:     Answer:   amiodarone therapy   • ECG 12 Lead     This order was created via procedure documentation          Plan:       1.  Atrial Fibrillation and Atrial Flutter  Assessment  • The patient has paroxysmal atrial fibrillation  • This is non-valvular in etiology  • The patient's CHADS2-VASc score is 3  • A [id number] score of 2 or more is considered a high risk for a thromboembolic event  • Rivaroxaban prescribed    Plan  • Attempt to maintain sinus rhythm  • Continue rivaroxaban for antithrombotic therapy, bleeding issues discussed  • Continue amiodarone for rhythm control    Subjective -  Objective  • The average duration of atrial fibrillation episodes is <48 hours  • She is in sinus rhythm today.  She is a little bradycardic but asymptomatic.  She is anticoagulated with Xarelto and has not had any bleeding issues or melena.  She gets her thyroid and liver function tests regularly with her primary care physician, I am going to check a chest x-ray today because of her amiodarone therapy.  Otherwise she is doing great.  I encouraged her to stay away from alcohol, continue with the water aerobics, and cut back on sweets.  She will follow-up with Dr. Worthy in 1 year or sooner if needed.        As always, it has been a pleasure to participate in your patient's care.      Sincerely,         Monica Rawls PA-C

## 2019-04-19 ENCOUNTER — TRANSCRIBE ORDERS (OUTPATIENT)
Dept: ADMINISTRATIVE | Facility: HOSPITAL | Age: 74
End: 2019-04-19

## 2019-04-19 DIAGNOSIS — Z12.31 VISIT FOR SCREENING MAMMOGRAM: Primary | ICD-10-CM

## 2019-04-24 ENCOUNTER — HOSPITAL ENCOUNTER (OUTPATIENT)
Dept: MAMMOGRAPHY | Facility: HOSPITAL | Age: 74
Discharge: HOME | End: 2019-04-24
Admitting: INTERNAL MEDICINE

## 2019-04-24 ENCOUNTER — HOSPITAL ENCOUNTER (OUTPATIENT)
Dept: MAMMOGRAPHY | Facility: HOSPITAL | Age: 74
Discharge: HOME | End: 2019-04-24

## 2019-04-24 DIAGNOSIS — Z12.31 VISIT FOR SCREENING MAMMOGRAM: ICD-10-CM

## 2019-04-24 PROCEDURE — 77063 BREAST TOMOSYNTHESIS BI: CPT

## 2019-04-24 PROCEDURE — 77067 SCR MAMMO BI INCL CAD: CPT

## 2019-05-14 RX ORDER — AMLODIPINE BESYLATE 10 MG/1
TABLET ORAL
Qty: 90 TABLET | Refills: 3 | Status: SHIPPED | OUTPATIENT
Start: 2019-05-14 | End: 2020-06-04

## 2019-05-14 RX ORDER — BENAZEPRIL HYDROCHLORIDE 20 MG/1
TABLET ORAL
Qty: 90 TABLET | Refills: 3 | Status: SHIPPED | OUTPATIENT
Start: 2019-05-14 | End: 2020-04-17 | Stop reason: SDUPTHER

## 2019-06-20 ENCOUNTER — OFFICE VISIT (OUTPATIENT)
Dept: FAMILY MEDICINE CLINIC | Facility: CLINIC | Age: 74
End: 2019-06-20

## 2019-06-20 VITALS
TEMPERATURE: 97.2 F | SYSTOLIC BLOOD PRESSURE: 120 MMHG | DIASTOLIC BLOOD PRESSURE: 80 MMHG | BODY MASS INDEX: 33.27 KG/M2 | HEART RATE: 55 BPM | OXYGEN SATURATION: 98 % | RESPIRATION RATE: 16 BRPM | WEIGHT: 212 LBS | HEIGHT: 67 IN

## 2019-06-20 DIAGNOSIS — M54.50 CHRONIC LOW BACK PAIN WITHOUT SCIATICA, UNSPECIFIED BACK PAIN LATERALITY: ICD-10-CM

## 2019-06-20 DIAGNOSIS — R60.9 DEPENDENT EDEMA: ICD-10-CM

## 2019-06-20 DIAGNOSIS — N18.30 STAGE 3 CHRONIC KIDNEY DISEASE (HCC): ICD-10-CM

## 2019-06-20 DIAGNOSIS — I48.0 PAROXYSMAL ATRIAL FIBRILLATION (HCC): ICD-10-CM

## 2019-06-20 DIAGNOSIS — G89.29 CHRONIC LOW BACK PAIN WITHOUT SCIATICA, UNSPECIFIED BACK PAIN LATERALITY: ICD-10-CM

## 2019-06-20 DIAGNOSIS — I10 ESSENTIAL HYPERTENSION: Primary | ICD-10-CM

## 2019-06-20 DIAGNOSIS — M19.90 ARTHRITIS: ICD-10-CM

## 2019-06-20 PROCEDURE — 99213 OFFICE O/P EST LOW 20 MIN: CPT | Performed by: INTERNAL MEDICINE

## 2019-06-20 NOTE — PROGRESS NOTES
Subjective   Denise Staley is a 74 y.o. female. Patient is here today for follow-up on her hypertension, hyperlipidemia, paroxysmal atrial fibrillation, low back pain and arthritis, history of kidney disease and dependent edema.  Overall the patient stable.  She is getting cortisone injections in her knees and continues on the diclofenac once a day.  Otherwise she is been stable and has had no evidence of cardiac arrhythmias.  She did forget to get laboratory tests drawn so there are none to review.  Chief Complaint   Patient presents with   • Hypertension   • Hyperlipidemia   • Hypothyroidism          Vitals:    06/20/19 1317   BP: 120/80   Pulse: 55   Resp: 16   Temp: 97.2 °F (36.2 °C)   SpO2: 98%     The following portions of the patient's history were reviewed and updated as appropriate: allergies, current medications, past family history, past medical history, past social history, past surgical history and problem list.    Past Medical History:   Diagnosis Date   • Arthritis    • Atrial fibrillation (CMS/HCC)    • Fatigue    • Health care maintenance    • Hyperlipidemia    • Hypertension    • Lower back pain    • Obesity    • Pelvic pain in female    • SOB (shortness of breath) on exertion    • TSH elevation    • Ventricular hypertrophy       No Known Allergies   Social History     Socioeconomic History   • Marital status:      Spouse name: Not on file   • Number of children: Not on file   • Years of education: Not on file   • Highest education level: Not on file   Tobacco Use   • Smoking status: Former Smoker   • Smokeless tobacco: Former User   • Tobacco comment: no caffeine use   Substance and Sexual Activity   • Alcohol use: No   • Drug use: Defer   • Sexual activity: Defer        Current Outpatient Medications:   •  Acetaminophen (TYLENOL ARTHRITIS PAIN PO), Take 2 tablets by mouth As Needed., Disp: , Rfl:   •  amiodarone (PACERONE) 200 MG tablet, Take 1 tablet by mouth Daily., Disp: 90  tablet, Rfl: 3  •  amLODIPine (NORVASC) 10 MG tablet, TAKE 1 TABLET EVERY DAY, Disp: 90 tablet, Rfl: 3  •  atorvastatin (LIPITOR) 20 MG tablet, Take 1 tablet by mouth Daily., Disp: 90 tablet, Rfl: 3  •  benazepril (LOTENSIN) 20 MG tablet, TAKE 1 TABLET EVERY DAY, Disp: 90 tablet, Rfl: 3  •  Calcium Carbonate-Vitamin D (CALCIUM + D PO), Take 1 tablet by mouth Daily., Disp: , Rfl:   •  Cholecalciferol (VITAMIN D) 1000 UNITS tablet, Take 1,000 Units by mouth Daily., Disp: , Rfl:   •  diclofenac (VOLTAREN) 75 MG EC tablet, TAKE 1 TABLET EVERY DAY, Disp: 90 tablet, Rfl: 3  •  fluticasone (FLONASE) 50 MCG/ACT nasal spray, 2 sprays into each nostril Daily., Disp: , Rfl:   •  Loratadine 10 MG capsule, Take 1 tablet by mouth Daily., Disp: , Rfl:   •  rivaroxaban (XARELTO) 20 MG tablet, Take 1 tablet by mouth Daily., Disp: 28 tablet, Rfl: 0     Objective     History of Present Illness     Review of Systems   Constitutional: Negative.    HENT: Negative.    Eyes: Negative.    Respiratory: Negative.    Cardiovascular: Positive for leg swelling. Negative for palpitations.   Gastrointestinal: Negative.    Genitourinary: Negative.    Musculoskeletal: Positive for arthralgias and back pain.   Skin: Negative.    Neurological: Negative.    Psychiatric/Behavioral: Negative.        Physical Exam   Constitutional: She is oriented to person, place, and time. She appears well-developed and well-nourished.   Pleasant, cooperative no distress, blood pressure 120/80 and a regular cardiac rhythm   HENT:   Head: Normocephalic and atraumatic.   Eyes: Conjunctivae are normal. Pupils are equal, round, and reactive to light. No scleral icterus.   Neck: Normal range of motion. Neck supple.   Cardiovascular: Normal rate, regular rhythm and normal heart sounds.   Pulmonary/Chest: Effort normal and breath sounds normal. No respiratory distress. She has no wheezes. She has no rales.   Musculoskeletal: Normal range of motion. She exhibits no edema.    Neurological: She is alert and oriented to person, place, and time.   Skin: Skin is warm and dry.   Psychiatric: She has a normal mood and affect. Her behavior is normal.   Nursing note and vitals reviewed.      ASSESSMENT overall the patient seems stable.  She is having no breathing problems or chest pains.  She has had no palpitations.  She is in a regular cardiac rhythm currently and lungs are clear.  There is 1+ pedal edema is all.  Her back and knee pain are stable on medications.  There are no laboratory studies to review today.     Problem List Items Addressed This Visit        Cardiovascular and Mediastinum    Atrial fibrillation (CMS/HCC)    Hypertension - Primary       Nervous and Auditory    Low back pain       Musculoskeletal and Integument    Arthritis       Genitourinary    Stage 3 chronic kidney disease (CMS/HCC)       Other    Dependent edema          PLAN the patient will continue current medicines as now.  I recommended she get a Tdap immunization and her second shingles immunization.  I will see her back in 4 months with a CBC, CMP, lipid panel, TSH and free T4 to be drawn at her outside lab.    There are no Patient Instructions on file for this visit.  Return in about 4 months (around 10/20/2019).

## 2019-08-13 RX ORDER — AMIODARONE HYDROCHLORIDE 200 MG/1
200 TABLET ORAL DAILY
Qty: 90 TABLET | Refills: 3 | Status: SHIPPED | OUTPATIENT
Start: 2019-08-13 | End: 2020-08-25 | Stop reason: SDUPTHER

## 2019-10-18 LAB
ALBUMIN SERPL-MCNC: 4.3 G/DL (ref 3.5–4.8)
ALBUMIN/GLOB SERPL: 1.9 {RATIO} (ref 1.2–2.2)
ALP SERPL-CCNC: 69 IU/L (ref 39–117)
ALT SERPL-CCNC: 18 IU/L (ref 0–32)
AMBIG ABBREV CMP14 DEFAULT: NORMAL
AMBIG ABBREV LP DEFAULT: NORMAL
AST SERPL-CCNC: 16 IU/L (ref 0–40)
BASOPHILS # BLD AUTO: 0 X10E3/UL (ref 0–0.2)
BASOPHILS NFR BLD AUTO: 0 %
BILIRUB SERPL-MCNC: 0.8 MG/DL (ref 0–1.2)
BUN SERPL-MCNC: 31 MG/DL (ref 8–27)
BUN/CREAT SERPL: 22 (ref 12–28)
CALCIUM SERPL-MCNC: 9.9 MG/DL (ref 8.7–10.3)
CHLORIDE SERPL-SCNC: 110 MMOL/L (ref 96–106)
CHOLEST SERPL-MCNC: 171 MG/DL (ref 100–199)
CO2 SERPL-SCNC: 19 MMOL/L (ref 20–29)
CREAT SERPL-MCNC: 1.4 MG/DL (ref 0.57–1)
EOSINOPHIL # BLD AUTO: 0.1 X10E3/UL (ref 0–0.4)
EOSINOPHIL NFR BLD AUTO: 1 %
ERYTHROCYTE [DISTWIDTH] IN BLOOD BY AUTOMATED COUNT: 13.4 % (ref 12.3–15.4)
GLOBULIN SER CALC-MCNC: 2.3 G/DL (ref 1.5–4.5)
GLUCOSE SERPL-MCNC: 81 MG/DL (ref 65–99)
HCT VFR BLD AUTO: 42.7 % (ref 34–46.6)
HDLC SERPL-MCNC: 69 MG/DL
HGB BLD-MCNC: 14.1 G/DL (ref 11.1–15.9)
IMM GRANULOCYTES # BLD AUTO: 0.1 X10E3/UL (ref 0–0.1)
IMM GRANULOCYTES NFR BLD AUTO: 1 %
LDLC SERPL CALC-MCNC: 89 MG/DL (ref 0–99)
LYMPHOCYTES # BLD AUTO: 0.6 X10E3/UL (ref 0.7–3.1)
LYMPHOCYTES NFR BLD AUTO: 8 %
MCH RBC QN AUTO: 32.7 PG (ref 26.6–33)
MCHC RBC AUTO-ENTMCNC: 33 G/DL (ref 31.5–35.7)
MCV RBC AUTO: 99 FL (ref 79–97)
MONOCYTES # BLD AUTO: 0.8 X10E3/UL (ref 0.1–0.9)
MONOCYTES NFR BLD AUTO: 10 %
NEUTROPHILS # BLD AUTO: 6.6 X10E3/UL (ref 1.4–7)
NEUTROPHILS NFR BLD AUTO: 80 %
PLATELET # BLD AUTO: 350 X10E3/UL (ref 150–450)
POTASSIUM SERPL-SCNC: 4.4 MMOL/L (ref 3.5–5.2)
PROT SERPL-MCNC: 6.6 G/DL (ref 6–8.5)
RBC # BLD AUTO: 4.31 X10E6/UL (ref 3.77–5.28)
SODIUM SERPL-SCNC: 145 MMOL/L (ref 134–144)
T4 FREE SERPL-MCNC: 1.46 NG/DL (ref 0.82–1.77)
TRIGL SERPL-MCNC: 65 MG/DL (ref 0–149)
TSH SERPL DL<=0.005 MIU/L-ACNC: 2.88 UIU/ML (ref 0.45–4.5)
VLDLC SERPL CALC-MCNC: 13 MG/DL (ref 5–40)
WBC # BLD AUTO: 8.1 X10E3/UL (ref 3.4–10.8)

## 2019-10-24 ENCOUNTER — OFFICE VISIT (OUTPATIENT)
Dept: FAMILY MEDICINE CLINIC | Facility: CLINIC | Age: 74
End: 2019-10-24

## 2019-10-24 VITALS
DIASTOLIC BLOOD PRESSURE: 68 MMHG | SYSTOLIC BLOOD PRESSURE: 110 MMHG | HEART RATE: 54 BPM | RESPIRATION RATE: 16 BRPM | BODY MASS INDEX: 32.8 KG/M2 | WEIGHT: 209 LBS | OXYGEN SATURATION: 98 % | HEIGHT: 67 IN | TEMPERATURE: 97.8 F

## 2019-10-24 DIAGNOSIS — I10 ESSENTIAL HYPERTENSION: Primary | ICD-10-CM

## 2019-10-24 DIAGNOSIS — R79.89 TSH ELEVATION: ICD-10-CM

## 2019-10-24 DIAGNOSIS — R51.9 HEADACHE IN BACK OF HEAD: ICD-10-CM

## 2019-10-24 DIAGNOSIS — R60.9 DEPENDENT EDEMA: ICD-10-CM

## 2019-10-24 DIAGNOSIS — E78.5 HYPERLIPIDEMIA, UNSPECIFIED HYPERLIPIDEMIA TYPE: ICD-10-CM

## 2019-10-24 DIAGNOSIS — N18.30 STAGE 3 CHRONIC KIDNEY DISEASE (HCC): ICD-10-CM

## 2019-10-24 DIAGNOSIS — I48.0 PAROXYSMAL ATRIAL FIBRILLATION (HCC): ICD-10-CM

## 2019-10-24 PROCEDURE — 99214 OFFICE O/P EST MOD 30 MIN: CPT | Performed by: INTERNAL MEDICINE

## 2019-11-19 DIAGNOSIS — R51.9 HEADACHE IN BACK OF HEAD: ICD-10-CM

## 2019-11-26 ENCOUNTER — TELEPHONE (OUTPATIENT)
Dept: FAMILY MEDICINE CLINIC | Facility: CLINIC | Age: 74
End: 2019-11-26

## 2019-11-26 NOTE — TELEPHONE ENCOUNTER
CALLED AND S/W PT AND ADVISED WHAT DR. HORN SAID. PT VOICED UNDERSTANDING.       ----- Message from Mykel Horn MD sent at 11/26/2019 10:19 AM EST -----  Head showed some possibly mild sinus disease but the brain was fine.  She does have one and there were no abnormalities noted      ----- Message -----  From: Nicole Barrera MA  Sent: 11/26/2019   8:37 AM  To: MD DR. YOUNG Gomes,    PLEASE SEE BELOW AND ADVISE. THANK YOU.      ----- Message -----  From: Melanie West  Sent: 11/25/2019   1:04 PM  To: Nicole Barrera MA    PATIENT CALLING TO GET HER RESULTS OF A CT SHE HAD ON Monday 18 OF THIS MONTH.    PT WOULD LIKE TO GET A CALL BACK ABOUT THIS [phone].    THANK YOU

## 2020-01-02 RX ORDER — DICLOFENAC SODIUM 75 MG/1
TABLET, DELAYED RELEASE ORAL
Qty: 90 TABLET | Refills: 3 | Status: SHIPPED | OUTPATIENT
Start: 2020-01-02 | End: 2020-04-17 | Stop reason: SDUPTHER

## 2020-02-26 RX ORDER — ATORVASTATIN CALCIUM 20 MG/1
TABLET, FILM COATED ORAL
Qty: 90 TABLET | Refills: 3 | Status: SHIPPED | OUTPATIENT
Start: 2020-02-26 | End: 2021-02-26

## 2020-04-15 ENCOUNTER — OFFICE VISIT (OUTPATIENT)
Dept: CARDIOLOGY | Facility: CLINIC | Age: 75
End: 2020-04-15

## 2020-04-15 VITALS
SYSTOLIC BLOOD PRESSURE: 157 MMHG | HEIGHT: 67 IN | BODY MASS INDEX: 32.96 KG/M2 | HEART RATE: 47 BPM | DIASTOLIC BLOOD PRESSURE: 74 MMHG | WEIGHT: 210 LBS

## 2020-04-15 DIAGNOSIS — I48.0 PAROXYSMAL ATRIAL FIBRILLATION (HCC): Primary | ICD-10-CM

## 2020-04-15 PROCEDURE — 99442 PR PHYS/QHP TELEPHONE EVALUATION 11-20 MIN: CPT | Performed by: INTERNAL MEDICINE

## 2020-04-15 NOTE — PROGRESS NOTES
[facility]      Patient Name: Denise Staley  :1945  Age: 74 y.o.  Encounter Provider:  Angelito Velazquez Jr, MD      Chief Complaint:   Chief Complaint   Patient presents with   • PAF     1 yr fu telehealth     This patient has consented to a telehealth visit via telephone. The visit was scheduled as a telephone visit to comply with patient safety concerns in accordance with CDC recommendations.  All vitals recorded within this visit are reported by the patient.  I spent 20 minutes in total including but not limited to the 15 minutes spent in direct conversation with this patient.       HPI  Denise Staley is a 74 y.o. female past medical history of paroxysmal atrial fibrillation on amiodarone therapy long-term present for routine follow-up.  Patient has been doing fairly well but feels functional capacity is decreased as she is unable to participate in her weekly aquatic therapy sessions.  She is limited in her ability to walk secondary to severe osteoarthritis and has not been getting her periodic steroid injections for bilateral knees.  With her activity around the home she denies any chest pain, shortness of air or palpitations.  No dizziness or syncope.  No orthopnea, PND or edema above baseline.  She does know when she is in atrial fibrillation states that she was out of rhythm on  for a few hours which resolved after she took an extra amiodarone dose.  She has not been able to take beta-blocker in the past due to her very low resting heart rate.  She has no bleeding complications associated with rivaroxaban therapy.  Social and family history reviewed and are not pertinent to this clinic visit.      The following portions of the patient's history were reviewed and updated as appropriate: allergies, current medications, past family history, past medical history, past social history, past surgical history and problem list.      Review of Systems   Constitution:  "Positive for malaise/fatigue. Negative for fever.   Cardiovascular: Positive for leg swelling. Negative for chest pain, near-syncope and palpitations.   Respiratory: Negative for cough and shortness of breath.    Musculoskeletal: Positive for arthritis and joint pain.   Neurological: Negative for dizziness, light-headedness, numbness and paresthesias.   All other systems reviewed and are negative.    ROS was reviewed, updated and amended where necessary.    OBJECTIVE:   Vital Signs  Vitals:    04/15/20 1051   BP: 157/74   Pulse: (!) 47     Estimated body mass index is 32.89 kg/m² as calculated from the following:    Height as of this encounter: 170.2 cm (67\").    Weight as of this encounter: 95.3 kg (210 lb).    Physical Exam   Vitals reviewed.      Procedures          ASSESSMENT:      Diagnosis Plan   1. Paroxysmal atrial fibrillation (CMS/HCC)           PLAN OF CARE:     1. PAF -symptoms seem fairly well controlled with amiodarone therapy.  She has had recent thyroid and liver function tests.  She saw the ophthalmologist in the fall.  We will need to review any and all imaging and/or testing for lung function.  We will need an EKG to ensure that she has no QT prolongation with use of a class III antiarrhythmic drug.  We will see the patient back in clinic in 6 months.  She does have a condominium in [place] and states that she will stop by the office to get an EKG if she is in town in the next 3 to 4 months.  Continue Xarelto therapy.                 Discharge Medications           Accurate as of April 15, 2020 10:59 AM. If you have any questions, ask your nurse or doctor.               Continue These Medications      Instructions Start Date   amiodarone 200 MG tablet  Commonly known as:  PACERONE   200 mg, Oral, Daily      amLODIPine 10 MG tablet  Commonly known as:  NORVASC   TAKE 1 TABLET EVERY DAY      atorvastatin 20 MG tablet  Commonly known as:  LIPITOR   TAKE 1 TABLET EVERY DAY      benazepril 20 MG " tablet  Commonly known as:  LOTENSIN   TAKE 1 TABLET EVERY DAY      CALCIUM + D PO   1 tablet, Oral, Daily      diclofenac 75 MG EC tablet  Commonly known as:  VOLTAREN   TAKE 1 TABLET EVERY DAY      fluticasone 50 MCG/ACT nasal spray  Commonly known as:  FLONASE   2 sprays, Nasal, Daily      Loratadine 10 MG capsule   1 tablet, Oral, Daily      rivaroxaban 20 MG tablet  Commonly known as:  Xarelto   20 mg, Oral, Daily      TYLENOL ARTHRITIS PAIN PO   2 tablets, Oral, As Needed      Vitamin D 1000 units tablet   1,000 Units, Oral, Daily             Thank you for allowing me to participate in the care of your patient,      Sincerely,   Angelito Velazquez MD  [facility]  04/15/20  10:59

## 2020-04-17 RX ORDER — BENAZEPRIL HYDROCHLORIDE 20 MG/1
20 TABLET ORAL DAILY
Qty: 90 TABLET | Refills: 0 | Status: SHIPPED | OUTPATIENT
Start: 2020-04-17 | End: 2020-04-23 | Stop reason: DRUGHIGH

## 2020-04-17 RX ORDER — DICLOFENAC SODIUM 75 MG/1
75 TABLET, DELAYED RELEASE ORAL DAILY
Qty: 90 TABLET | Refills: 0 | Status: SHIPPED | OUTPATIENT
Start: 2020-04-17 | End: 2020-06-24

## 2020-04-22 ENCOUNTER — TELEPHONE (OUTPATIENT)
Dept: FAMILY MEDICINE CLINIC | Facility: CLINIC | Age: 75
End: 2020-04-22

## 2020-04-22 NOTE — TELEPHONE ENCOUNTER
PT CALLED STATING THAT FOR THE LAST WEEK OR 10 DAYS SHE HAS HAD AN ELEVATED BLOOD PRESSURE THAT IS GREATER IN THE EVENING.     PLEASE ADVISE     PT CALL BACK   [phone]

## 2020-04-23 ENCOUNTER — OFFICE VISIT (OUTPATIENT)
Dept: FAMILY MEDICINE CLINIC | Facility: CLINIC | Age: 75
End: 2020-04-23

## 2020-04-23 DIAGNOSIS — I10 ESSENTIAL HYPERTENSION: Primary | ICD-10-CM

## 2020-04-23 PROCEDURE — 99442 PR PHYS/QHP TELEPHONE EVALUATION 11-20 MIN: CPT | Performed by: INTERNAL MEDICINE

## 2020-04-23 RX ORDER — BENAZEPRIL HYDROCHLORIDE 20 MG/1
20 TABLET ORAL 2 TIMES DAILY
Qty: 180 TABLET | Refills: 3
Start: 2020-04-23 | End: 2020-06-24

## 2020-04-23 NOTE — PROGRESS NOTES
Subjective   Denise Staley is a 74 y.o. female. Patient is here today for a telephone visit concerning her hypertension.  Patient was specifically asked and agrees to a telephone visit.  She tells me that she monitors her blood pressure at home and that for the last week to 10 days it is been higher than usual.  In the morning the systolic is about 150 and in the evening systolics can be from 165 to the 180s.  Diastolics have remained normal.  She has had no other symptoms aside from increased arthritic complaints because she is not been able to do her water aerobics due to the COVID-19 pandemic she takes amlodipine 10 mg in the evening as well as benazepril 20 mg     No chief complaint on file.         There were no vitals filed for this visit.  There is no height or weight on file to calculate BMI.  The following portions of the patient's history were reviewed and updated as appropriate: allergies, current medications, past family history, past medical history, past social history, past surgical history and problem list.    Past Medical History:   Diagnosis Date   • Arthritis    • Atrial fibrillation (CMS/HCC)    • Fatigue    • Health care maintenance    • Hyperlipidemia    • Hypertension    • Lower back pain    • Obesity    • Pelvic pain in female    • SOB (shortness of breath) on exertion    • TSH elevation    • Ventricular hypertrophy       No Known Allergies   Social History     Socioeconomic History   • Marital status:      Spouse name: Not on file   • Number of children: Not on file   • Years of education: Not on file   • Highest education level: Not on file   Tobacco Use   • Smoking status: Former Smoker   • Smokeless tobacco: Former User   • Tobacco comment: no caffeine use   Substance and Sexual Activity   • Alcohol use: No   • Drug use: Defer   • Sexual activity: Defer        Current Outpatient Medications:   •  Acetaminophen (TYLENOL ARTHRITIS PAIN PO), Take 2 tablets by mouth As Needed.,  Disp: , Rfl:   •  amiodarone (PACERONE) 200 MG tablet, Take 1 tablet by mouth Daily., Disp: 90 tablet, Rfl: 3  •  amLODIPine (NORVASC) 10 MG tablet, TAKE 1 TABLET EVERY DAY, Disp: 90 tablet, Rfl: 3  •  atorvastatin (LIPITOR) 20 MG tablet, TAKE 1 TABLET EVERY DAY, Disp: 90 tablet, Rfl: 3  •  benazepril (LOTENSIN) 20 MG tablet, Take 1 tablet by mouth 2 (Two) Times a Day., Disp: 180 tablet, Rfl: 3  •  Calcium Carbonate-Vitamin D (CALCIUM + D PO), Take 1 tablet by mouth Daily., Disp: , Rfl:   •  Cholecalciferol (VITAMIN D) 1000 UNITS tablet, Take 1,000 Units by mouth Daily., Disp: , Rfl:   •  diclofenac (VOLTAREN) 75 MG EC tablet, Take 1 tablet by mouth Daily., Disp: 90 tablet, Rfl: 0  •  fluticasone (FLONASE) 50 MCG/ACT nasal spray, 2 sprays into each nostril Daily., Disp: , Rfl:   •  Loratadine 10 MG capsule, Take 1 tablet by mouth Daily., Disp: , Rfl:   •  rivaroxaban (XARELTO) 20 MG tablet, Take 1 tablet by mouth Daily., Disp: 90 tablet, Rfl: 2     Objective     History of Present Illness     Review of Systems   Constitutional: Negative.    HENT: Negative.    Respiratory: Negative.    Cardiovascular: Negative.    Gastrointestinal: Negative.    Genitourinary: Negative.    Musculoskeletal: Positive for arthralgias.   Skin: Negative.    Neurological: Negative.    Psychiatric/Behavioral: Negative.        Physical Exam    ASSESSMENT the patient's been having increased blood pressure at home but no other symptoms.  She has been tolerating benazepril and amlodipine.     Problem List Items Addressed This Visit     None          PLAN I am going to have the patient increase her benazepril to 20 mg twice a day and continue the amlodipine 10 mg daily and continue to monitor her blood pressure.  She is to let me know in a couple of weeks if her blood pressure does not decrease.  This was a telephone visit due to the COVID-19 pandemic, total time was 20 minutes    There are no Patient Instructions on file for this visit.  No  follow-ups on file.

## 2020-05-13 ENCOUNTER — TELEPHONE (OUTPATIENT)
Dept: CARDIOLOGY | Facility: CLINIC | Age: 75
End: 2020-05-13

## 2020-05-13 NOTE — TELEPHONE ENCOUNTER
BP Log    148/74   45  147/72   44  156/77   51  149/67   50  145/75   47  155/65   49  146/72   41  178/78   47  152/75  46  162/70  47  165/74   44  176/79   47  176/75   46  136/66   47  174/76   47    Current Medications   Amiodarone 200 mg daily  Amlodipine 10 mg daily  Benazepril 20 mg BID    C/O being cold and ankles swelling.  Occasional CP and HA.    Please advise.    CB: [phone]    Jeanette Bynum

## 2020-05-14 DIAGNOSIS — I10 ESSENTIAL HYPERTENSION: Primary | ICD-10-CM

## 2020-05-14 RX ORDER — HYDROCHLOROTHIAZIDE 12.5 MG/1
12.5 CAPSULE, GELATIN COATED ORAL DAILY
Qty: 30 CAPSULE | Refills: 11 | Status: SHIPPED | OUTPATIENT
Start: 2020-05-14 | End: 2020-07-08

## 2020-05-14 NOTE — TELEPHONE ENCOUNTER
Spoke to patient about adding hydrochlorothiazide 12.5 mg.  She will be coming to the [facility] next Wednesday at 12:00 to have a blood draw to evaluate for potassium and serum creatinine.

## 2020-05-20 ENCOUNTER — HOSPITAL ENCOUNTER (OUTPATIENT)
Dept: CARDIOLOGY | Facility: HOSPITAL | Age: 75
Discharge: HOME | End: 2020-05-20
Admitting: INTERNAL MEDICINE

## 2020-05-20 DIAGNOSIS — I10 ESSENTIAL HYPERTENSION: ICD-10-CM

## 2020-05-20 LAB
ANION GAP SERPL CALCULATED.3IONS-SCNC: 12.5 MMOL/L (ref 5–15)
BUN BLD-MCNC: 19 MG/DL (ref 8–23)
BUN/CREAT SERPL: 12.8 (ref 7–25)
CALCIUM SPEC-SCNC: 9.9 MG/DL (ref 8.6–10.5)
CHLORIDE SERPL-SCNC: 106 MMOL/L (ref 98–107)
CO2 SERPL-SCNC: 22.5 MMOL/L (ref 22–29)
CREAT BLD-MCNC: 1.48 MG/DL (ref 0.57–1)
GFR SERPL CREATININE-BSD FRML MDRD: 34 ML/MIN/1.73
GLUCOSE BLD-MCNC: 136 MG/DL (ref 65–99)
POTASSIUM BLD-SCNC: 3.9 MMOL/L (ref 3.5–5.2)
SODIUM BLD-SCNC: 141 MMOL/L (ref 136–145)

## 2020-05-20 PROCEDURE — 36415 COLL VENOUS BLD VENIPUNCTURE: CPT

## 2020-05-20 PROCEDURE — 80048 BASIC METABOLIC PNL TOTAL CA: CPT | Performed by: INTERNAL MEDICINE

## 2020-06-04 RX ORDER — AMLODIPINE BESYLATE 10 MG/1
TABLET ORAL
Qty: 90 TABLET | Refills: 3 | Status: SHIPPED | OUTPATIENT
Start: 2020-06-04 | End: 2021-05-19

## 2020-06-24 ENCOUNTER — TELEPHONE (OUTPATIENT)
Dept: CARDIOLOGY | Facility: CLINIC | Age: 75
End: 2020-06-24

## 2020-06-24 RX ORDER — BENAZEPRIL HYDROCHLORIDE 20 MG/1
TABLET ORAL
Qty: 90 TABLET | Refills: 0 | Status: SHIPPED | OUTPATIENT
Start: 2020-06-24 | End: 2020-09-18

## 2020-06-24 RX ORDER — DICLOFENAC SODIUM 75 MG/1
TABLET, DELAYED RELEASE ORAL
Qty: 90 TABLET | Refills: 0 | Status: SHIPPED | OUTPATIENT
Start: 2020-06-24 | End: 2021-01-19

## 2020-06-24 NOTE — TELEPHONE ENCOUNTER
"Dr. Velazquez,      Ms. Staley called today to report a 2 day history of being in Afib. Her HR is running in the 70s and 80s.. She is normally in the 40s and 50s.  She started taking an extra dose of amioderone as instructed by Dr. Worthy yesterday when this started but remains in Afib.      Her BP has been elevated recently as well and running in the  140s-170s/61-80s.  This morning she went to the grocery after her breakfast.  She almost passed out at the end of her trip while waiting in line to check out.  There they checked her BP and it was 102/?, her HR was 88, blood sugar was 140. She was able to come home after drinking some fluids.  She states that she \"feels lousy\" when she is in Afib.    She is performing valsalva maneuvers to try and convert the Afib without success.  She  States she will be in town in July and I made her an appt with you  For July 8th.    In the meantime, any recommendations for her?      Micki Issa RN  Triage [facility]    "

## 2020-06-25 NOTE — TELEPHONE ENCOUNTER
Patient notified and given instructions.  Verbalized understanding and denied further questions.    She states that the Afib resolved yesterday (Wednesday) afternoon and hasn't returned at this time.    Micki Issa RN  Triage [facility]

## 2020-07-08 ENCOUNTER — OFFICE VISIT (OUTPATIENT)
Dept: CARDIOLOGY | Facility: CLINIC | Age: 75
End: 2020-07-08

## 2020-07-08 VITALS
HEIGHT: 68 IN | OXYGEN SATURATION: 99 % | DIASTOLIC BLOOD PRESSURE: 82 MMHG | SYSTOLIC BLOOD PRESSURE: 142 MMHG | BODY MASS INDEX: 31.77 KG/M2 | WEIGHT: 209.6 LBS | HEART RATE: 60 BPM

## 2020-07-08 DIAGNOSIS — J98.4 AMIODARONE PULMONARY TOXICITY: ICD-10-CM

## 2020-07-08 DIAGNOSIS — G47.10 HYPERSOMNIA: ICD-10-CM

## 2020-07-08 DIAGNOSIS — T46.2X5A AMIODARONE PULMONARY TOXICITY: ICD-10-CM

## 2020-07-08 DIAGNOSIS — I10 ESSENTIAL HYPERTENSION: Primary | ICD-10-CM

## 2020-07-08 PROCEDURE — 99214 OFFICE O/P EST MOD 30 MIN: CPT | Performed by: INTERNAL MEDICINE

## 2020-07-08 RX ORDER — CHLORTHALIDONE 25 MG/1
25 TABLET ORAL DAILY
Qty: 30 TABLET | Refills: 5 | Status: SHIPPED | OUTPATIENT
Start: 2020-07-08 | End: 2020-09-18

## 2020-07-08 NOTE — PROGRESS NOTES
[facility]      Patient Name: Denise Staley  :1945  Age: 75 y.o.  Encounter Provider:  Angelito Velazquez Jr, MD      Chief Complaint:   Chief Complaint   Patient presents with   • Atrial Fibrillation       Atrial Fibrillation   Symptoms are negative for chest pain, dizziness, palpitations and shortness of breath. Past medical history includes atrial fibrillation.     Denise Staley is a 75 y.o. female past medical history of paroxysmal atrial fibrillation on amiodarone therapy long-term present for routine follow-up.      Last clinic visit: Patient has been doing fairly well but feels functional capacity is decreased as she is unable to participate in her weekly aquatic therapy sessions.  She is limited in her ability to walk secondary to severe osteoarthritis and has not been getting her periodic steroid injections for bilateral knees.  With her activity around the home she denies any chest pain, shortness of air or palpitations.  No dizziness or syncope.  No orthopnea, PND or edema above baseline.  She does know when she is in atrial fibrillation states that she was out of rhythm on  for a few hours which resolved after she took an extra amiodarone dose.  She has not been able to take beta-blocker in the past due to her very low resting heart rate.  She has no bleeding complications associated with rivaroxaban therapy.  Social and family history reviewed and are not pertinent to this clinic visit.    She is done well since then.  She is due for her yearly surveillance for chronic amiodarone use.  No issues that she is aware of with liver or thyroid function, pulmonary function or change in vision.  She denies palpitations or dizziness.  No syncope.  She did have an episode in  where she had 2 days in atrial fibrillation that made her feel poorly.  She took an extra dose of amiodarone for 2 days and ultimately spontaneously cardioverted.  Since then she feels well.  " No orthopnea, PND or edema.  Patient admits to snoring and hypersomnia.  Social and family history reviewed are not pertinent this clinic visit.    The following portions of the patient's history were reviewed and updated as appropriate: allergies, current medications, past family history, past medical history, past social history, past surgical history and problem list.      Review of Systems   Constitution: Positive for malaise/fatigue. Negative for fever.   Cardiovascular: Positive for dyspnea on exertion and leg swelling. Negative for chest pain, near-syncope and palpitations.   Respiratory: Negative for cough and shortness of breath.    Musculoskeletal: Positive for arthritis and joint pain.   Neurological: Negative for dizziness, light-headedness, numbness and paresthesias.   All other systems reviewed and are negative.        OBJECTIVE:   Vital Signs  Vitals:    07/08/20 0957   BP: 142/82   Pulse: 60   SpO2: 99%     Estimated body mass index is 31.87 kg/m² as calculated from the following:    Height as of this encounter: 172.7 cm (68\").    Weight as of this encounter: 95.1 kg (209 lb 9.6 oz).    Physical Exam   Constitutional: She is oriented to person, place, and time. She appears well-developed and well-nourished.   HENT:   Head: Normocephalic and atraumatic.   Eyes: Pupils are equal, round, and reactive to light. Conjunctivae are normal.   Neck: No JVD present. No thyromegaly present.   Cardiovascular: Exam reveals no gallop and no friction rub.   No murmur heard.  Pulmonary/Chest: No respiratory distress. She exhibits no tenderness.   Abdominal: Bowel sounds are normal. She exhibits no distension.   Musculoskeletal: She exhibits edema. She exhibits no tenderness.   Neurological: She is alert and oriented to person, place, and time.   Skin: No rash noted. No erythema.   Psychiatric: She has a normal mood and affect. Judgment normal.   Vitals reviewed.      Procedures          ASSESSMENT:      Diagnosis " Plan   1. Essential hypertension  Comprehensive Metabolic Panel    Thyroid Panel With TSH   2. Amiodarone pulmonary toxicity  XR Chest 2 View   3. Hypersomnia  Home Sleep Study         PLAN OF CARE:     1. PAF -continue amiodarone she has an appointment with her ophthalmologist next month.  I have ordered chest x-ray, CMP, TFT.  Continue Xarelto therapy.  2. Snoring and hypersomnia -Home sleep study    Return to clinic 6 months                 Discharge Medications           Accurate as of July 8, 2020  1:31 PM. If you have any questions, ask your nurse or doctor.               New Medications      Instructions Start Date   chlorthalidone 25 MG tablet  Commonly known as:  HYGROTON  Started by:  Angelito Velazquez Jr, MD   25 mg, Oral, Daily         Changes to Medications      Instructions Start Date   benazepril 20 MG tablet  Commonly known as:  LOTENSIN  What changed:  how much to take   TAKE 1 TABLET EVERY DAY         Continue These Medications      Instructions Start Date   amiodarone 200 MG tablet  Commonly known as:  PACERONE   200 mg, Oral, Daily      amLODIPine 10 MG tablet  Commonly known as:  NORVASC   TAKE 1 TABLET EVERY DAY      atorvastatin 20 MG tablet  Commonly known as:  LIPITOR   TAKE 1 TABLET EVERY DAY      CALCIUM + D PO   1 tablet, Oral, Daily      CBD oil capsule  Commonly known as:  cannabidiol   20mg oral at [facility]       diclofenac 75 MG EC tablet  Commonly known as:  VOLTAREN   TAKE 1 TABLET EVERY DAY      fluticasone 50 MCG/ACT nasal spray  Commonly known as:  FLONASE   2 sprays, Nasal, Daily      Loratadine 10 MG capsule   1 tablet, Oral, Daily      rivaroxaban 20 MG tablet  Commonly known as:  Xarelto   20 mg, Oral, Daily      TYLENOL ARTHRITIS PAIN PO   2 tablets, Oral, As Needed      Vitamin D 1000 units tablet   1,000 Units, Oral, Daily         Stop These Medications    hydroCHLOROthiazide 12.5 MG capsule  Commonly known as:  MICROZIDE  Stopped by:  Angeltio Velazquez Jr, MD            Thank you for  allowing me to participate in the care of your patient,      Sincerely,   Angelito Velazquez MD  [facility]  07/08/20  13:31

## 2020-08-04 ENCOUNTER — HOSPITAL ENCOUNTER (OUTPATIENT)
Dept: SLEEP MEDICINE | Facility: HOSPITAL | Age: 75
Discharge: HOME | End: 2020-08-04
Admitting: INTERNAL MEDICINE

## 2020-08-04 DIAGNOSIS — G47.10 HYPERSOMNIA: ICD-10-CM

## 2020-08-04 PROCEDURE — 95806 SLEEP STUDY UNATT&RESP EFFT: CPT

## 2020-08-04 PROCEDURE — 95806 SLEEP STUDY UNATT&RESP EFFT: CPT | Performed by: INTERNAL MEDICINE

## 2020-08-25 DIAGNOSIS — I48.11 LONGSTANDING PERSISTENT ATRIAL FIBRILLATION (HCC): Primary | ICD-10-CM

## 2020-08-25 RX ORDER — AMIODARONE HYDROCHLORIDE 200 MG/1
200 TABLET ORAL DAILY
Qty: 90 TABLET | Refills: 3 | Status: SHIPPED | OUTPATIENT
Start: 2020-08-25 | End: 2020-09-14 | Stop reason: SDUPTHER

## 2020-09-02 ENCOUNTER — OFFICE VISIT (OUTPATIENT)
Dept: SLEEP MEDICINE | Facility: HOSPITAL | Age: 75
End: 2020-09-02

## 2020-09-02 VITALS — BODY MASS INDEX: 31.49 KG/M2 | HEIGHT: 68 IN | WEIGHT: 207.8 LBS | HEART RATE: 73 BPM | OXYGEN SATURATION: 96 %

## 2020-09-02 DIAGNOSIS — G47.14 HYPERSOMNIA DUE TO MEDICAL CONDITION: Primary | ICD-10-CM

## 2020-09-02 DIAGNOSIS — G47.33 OSA (OBSTRUCTIVE SLEEP APNEA): ICD-10-CM

## 2020-09-02 DIAGNOSIS — IMO0002 SLEEP-RELATED HYPOVENTILATION: ICD-10-CM

## 2020-09-02 PROCEDURE — 99204 OFFICE O/P NEW MOD 45 MIN: CPT | Performed by: INTERNAL MEDICINE

## 2020-09-02 PROCEDURE — G0463 HOSPITAL OUTPT CLINIC VISIT: HCPCS

## 2020-09-02 NOTE — PROGRESS NOTES
[facility] New Patient[facility]Consultation       Reason for Consultation: TODD    Patient Care Team:  Mykel De Los Santos MD as PCP - General  Dilan Morrell MD as Consulting Physician (Sleep Medicine)    Chief complaint: TODD    History of present illness:    Thank you for asking me to see your patient.  The patient is a 75 y.o. female who has a history of atrial fibrillation.  The patient is also treated with amiodarone.  She is described as having amiodarone pulmonary toxicity.  Cardiology requested a home sleep study.    Home sleep study 8/4/2020 demonstrated mild obstructive sleep apnea.  AHI is mildly abnormal at 13.8 events per hour.  Low oxygen saturation 69% and sleep-related hypoxia present for 234 minutes.  The patient snored 18% of the total monitoring time.  Sleep evaluation requested.    The patient reports she has difficulty going to sleep at times.  She awakens frequently to go to the bathroom, mainly to 2 times during the nighttime.  She goes to bed at 11 PM and awakens at 7 AM.  She is tired upon arising.    Rarely, she will take a short nap during the daytime.  She reports her Conroy Sleepiness Scale being normal at 2- 3. Sometimes she will have a morning headache.  Occasionally she has problems falling asleep.    Review of Systems:    A complete review of systems was done and all were negative with the exception of some shortness of breath with exertion    History:  Past Medical History:   Diagnosis Date   • Arthritis    • Atrial fibrillation (CMS/HCC)    • Fatigue    • Health care maintenance    • Hyperlipidemia    • Hypertension    • Lower back pain    • Obesity    • TODD (obstructive sleep apnea) 08/04/2020    Home sleep study.  Weight 209 pounds.  At least mild TODD with AHI of 13.8 events per hour.  Low O2 saturation 69% and sleep-related hypoxia present for 234 minutes.  The patient snored 18% of the total monitoring time.   • Pelvic pain in female    • SOB (shortness of breath) on  "exertion    • TSH elevation    • Ventricular hypertrophy    ,   Past Surgical History:   Procedure Laterality Date   • CARDIOVERSION      MULTIPLE   • TONSILLECTOMY     ,   Family History   Problem Relation Age of Onset   • Lung cancer Mother 75   • Ovarian cancer Sister         60's   • Breast cancer Maternal Aunt         70's   • No Known Problems Father    • No Known Problems Maternal Grandmother    • No Known Problems Maternal Grandfather    • No Known Problems Paternal Grandmother    • No Known Problems Paternal Grandfather     and   Social History     Tobacco Use   • Smoking status: Former Smoker   • Smokeless tobacco: Former User   • Tobacco comment: no caffeine use   Substance Use Topics   • Alcohol use: No   • Drug use: Defer     Social History: No caffeine    Allergies:  Patient has no known allergies.     Medication Review: Reviewed this    Vital Signs:    Vitals:    09/02/20 1130   Pulse: 73   SpO2: 96%   Weight: 94.3 kg (207 lb 12.8 oz)   Height: 172.7 cm (68\")      Body mass index is 31.6 kg/m².         Physical Exam:    Constitutional:  Well developed 75 y.o. female that appears in no apparent distress.  Awake & oriented times 3.  Normal mood with normal recent and remote memory and normal judgement.  Eyes:  Conjunctivae normal.  Oropharynx: Moist mucous membranes without exudate and a large tongue and class III Mallampati airway  Neck: Trachea midline  Respiratory: Effort is not labored  Cardiovascular: Radial pulse regular  Musculoskeletal: Gait appears normal, no digital clubbing evident, 1+ pre-tibial edema    Results Review: I reviewed the results of the home sleep study with her       Impression:   Obstructive sleep apnea, mild severity suggested, probably underestimated, with significant sleep-related hypoxemia with some complaints of hypersomnolence by home sleep study 8/4/2020.    Plan:  Good sleep hygiene measures should be maintained.  Weight loss would be beneficial in this patient who is " obese.    Pathophysiology of TODD described to the patient.  Cardiovascular complications of untreated TODD also reviewed.  Relationship of TODD with atrial fibrillation also described.    As stated above, I reviewed all with the patient.  However, at this time, she is reluctant to proceed with a trial of auto CPAP.  She states she does not wish to start this at her age.  I explained risk and benefit of untreated untreated obstructive sleep apnea.  The patient wishes to talk to her cardiologist and will let me know.    Thank you for requesting me to assist in this patient's care.    Dilan Morrell MD  Sleep Medicine  09/02/20  11:32

## 2020-09-05 PROBLEM — IMO0002 SLEEP-RELATED HYPOVENTILATION: Status: ACTIVE | Noted: 2020-09-05

## 2020-09-05 PROBLEM — G47.14 HYPERSOMNIA DUE TO MEDICAL CONDITION: Status: ACTIVE | Noted: 2020-09-05

## 2020-09-05 PROBLEM — G47.33 OSA (OBSTRUCTIVE SLEEP APNEA): Status: ACTIVE | Noted: 2020-08-04

## 2020-09-09 LAB
ALBUMIN SERPL-MCNC: 4.4 G/DL (ref 3.7–4.7)
ALBUMIN/GLOB SERPL: 2 {RATIO} (ref 1.2–2.2)
ALP SERPL-CCNC: 70 IU/L (ref 39–117)
ALT SERPL-CCNC: 19 IU/L (ref 0–32)
AMBIG ABBREV CMP14 DEFAULT: NORMAL
AMBIG ABBREV LP DEFAULT: NORMAL
AST SERPL-CCNC: 21 IU/L (ref 0–40)
BASOPHILS # BLD AUTO: 0 X10E3/UL (ref 0–0.2)
BASOPHILS NFR BLD AUTO: 0 %
BILIRUB SERPL-MCNC: 0.6 MG/DL (ref 0–1.2)
BUN SERPL-MCNC: 27 MG/DL (ref 8–27)
BUN/CREAT SERPL: 16 (ref 12–28)
CALCIUM SERPL-MCNC: 10.1 MG/DL (ref 8.7–10.3)
CHLORIDE SERPL-SCNC: 107 MMOL/L (ref 96–106)
CHOLEST SERPL-MCNC: 162 MG/DL (ref 100–199)
CO2 SERPL-SCNC: 23 MMOL/L (ref 20–29)
CREAT SERPL-MCNC: 1.64 MG/DL (ref 0.57–1)
EOSINOPHIL # BLD AUTO: 0.1 X10E3/UL (ref 0–0.4)
EOSINOPHIL NFR BLD AUTO: 2 %
ERYTHROCYTE [DISTWIDTH] IN BLOOD BY AUTOMATED COUNT: 12.8 % (ref 11.7–15.4)
GLOBULIN SER CALC-MCNC: 2.2 G/DL (ref 1.5–4.5)
GLUCOSE SERPL-MCNC: 81 MG/DL (ref 65–99)
HCT VFR BLD AUTO: 41.2 % (ref 34–46.6)
HDLC SERPL-MCNC: 62 MG/DL
HGB BLD-MCNC: 13.9 G/DL (ref 11.1–15.9)
IMM GRANULOCYTES # BLD AUTO: 0 X10E3/UL (ref 0–0.1)
IMM GRANULOCYTES NFR BLD AUTO: 0 %
LDLC SERPL CALC-MCNC: 82 MG/DL (ref 0–99)
LYMPHOCYTES # BLD AUTO: 0.8 X10E3/UL (ref 0.7–3.1)
LYMPHOCYTES NFR BLD AUTO: 12 %
MCH RBC QN AUTO: 34.4 PG (ref 26.6–33)
MCHC RBC AUTO-ENTMCNC: 33.7 G/DL (ref 31.5–35.7)
MCV RBC AUTO: 102 FL (ref 79–97)
MONOCYTES # BLD AUTO: 0.6 X10E3/UL (ref 0.1–0.9)
MONOCYTES NFR BLD AUTO: 9 %
NEUTROPHILS # BLD AUTO: 5.4 X10E3/UL (ref 1.4–7)
NEUTROPHILS NFR BLD AUTO: 77 %
PLATELET # BLD AUTO: 347 X10E3/UL (ref 150–450)
POTASSIUM SERPL-SCNC: 4.3 MMOL/L (ref 3.5–5.2)
PROT SERPL-MCNC: 6.6 G/DL (ref 6–8.5)
RBC # BLD AUTO: 4.04 X10E6/UL (ref 3.77–5.28)
SODIUM SERPL-SCNC: 145 MMOL/L (ref 134–144)
T4 FREE SERPL-MCNC: 1.54 NG/DL (ref 0.82–1.77)
TRIGL SERPL-MCNC: 101 MG/DL (ref 0–149)
TSH SERPL DL<=0.005 MIU/L-ACNC: 5.44 UIU/ML (ref 0.45–4.5)
VLDLC SERPL CALC-MCNC: 18 MG/DL (ref 5–40)
WBC # BLD AUTO: 7 X10E3/UL (ref 3.4–10.8)

## 2020-09-14 RX ORDER — AMIODARONE HYDROCHLORIDE 200 MG/1
200 TABLET ORAL DAILY
Qty: 90 TABLET | Refills: 3 | Status: SHIPPED | OUTPATIENT
Start: 2020-09-14 | End: 2021-07-27 | Stop reason: DRUGHIGH

## 2020-09-18 ENCOUNTER — OFFICE VISIT (OUTPATIENT)
Dept: FAMILY MEDICINE CLINIC | Facility: CLINIC | Age: 75
End: 2020-09-18

## 2020-09-18 DIAGNOSIS — G47.33 OSA (OBSTRUCTIVE SLEEP APNEA): ICD-10-CM

## 2020-09-18 DIAGNOSIS — E78.5 HYPERLIPIDEMIA, UNSPECIFIED HYPERLIPIDEMIA TYPE: Primary | ICD-10-CM

## 2020-09-18 DIAGNOSIS — I48.0 PAROXYSMAL ATRIAL FIBRILLATION (HCC): ICD-10-CM

## 2020-09-18 DIAGNOSIS — IMO0002 SLEEP-RELATED HYPOVENTILATION: ICD-10-CM

## 2020-09-18 DIAGNOSIS — R79.89 TSH ELEVATION: ICD-10-CM

## 2020-09-18 DIAGNOSIS — I10 ESSENTIAL HYPERTENSION: ICD-10-CM

## 2020-09-18 DIAGNOSIS — M19.90 ARTHRITIS: ICD-10-CM

## 2020-09-18 DIAGNOSIS — N18.30 STAGE 3 CHRONIC KIDNEY DISEASE (HCC): ICD-10-CM

## 2020-09-18 PROCEDURE — 99443 PR PHYS/QHP TELEPHONE EVALUATION 21-30 MIN: CPT | Performed by: INTERNAL MEDICINE

## 2020-09-18 RX ORDER — BENAZEPRIL HYDROCHLORIDE 40 MG/1
40 TABLET, FILM COATED ORAL DAILY
Qty: 90 TABLET | Refills: 3 | Status: SHIPPED | OUTPATIENT
Start: 2020-09-18 | End: 2021-08-12

## 2020-09-18 RX ORDER — HYDROCHLOROTHIAZIDE 25 MG/1
25 TABLET ORAL DAILY
COMMUNITY
End: 2021-01-13 | Stop reason: SDUPTHER

## 2020-09-18 NOTE — PROGRESS NOTES
Subjective   Denise Staley is a 75 y.o. female. Patient is here today for a telephone visit due to the COVID-19 pandemic.  The patient was specifically asked about a telephone visit and consents to a telephone visit.  She has had recent laboratory studies and needs to review those.  She also has been diagnosed with obstructive sleep apnea with significant hypoxia at night and CPAP was recommended by Dr. Morrell.  She wants to talk about that.  Finally she has arthritis in the knees and had recent cortisone injections that seem to be helping.  Her most recent blood pressure was high at 170 systolic.     No chief complaint on file.         There were no vitals filed for this visit.  There is no height or weight on file to calculate BMI.  The following portions of the patient's history were reviewed and updated as appropriate: allergies, current medications, past family history, past medical history, past social history, past surgical history and problem list.    Past Medical History:   Diagnosis Date   • Arthritis    • Atrial fibrillation (CMS/HCC)    • Fatigue    • Health care maintenance    • Hyperlipidemia    • Hypertension    • Lower back pain    • Obesity    • TODD (obstructive sleep apnea) 08/04/2020    Home sleep study.  Weight 209 pounds.  At least mild TODD with AHI of 13.8 events per hour.  Low O2 saturation 69% and sleep-related hypoxia present for 234 minutes.  The patient snored 18% of the total monitoring time.   • Pelvic pain in female    • SOB (shortness of breath) on exertion    • TSH elevation    • Ventricular hypertrophy       No Known Allergies   Social History     Socioeconomic History   • Marital status:      Spouse name: Not on file   • Number of children: Not on file   • Years of education: Not on file   • Highest education level: Not on file   Tobacco Use   • Smoking status: Former Smoker   • Smokeless tobacco: Former User   • Tobacco comment: no caffeine use   Substance and  Sexual Activity   • Alcohol use: No   • Drug use: Defer   • Sexual activity: Defer        Current Outpatient Medications:   •  hydroCHLOROthiazide (HYDRODIURIL) 25 MG tablet, Take 25 mg by mouth Daily., Disp: , Rfl:   •  Acetaminophen (TYLENOL ARTHRITIS PAIN PO), Take 2 tablets by mouth As Needed., Disp: , Rfl:   •  amiodarone (PACERONE) 200 MG tablet, Take 1 tablet by mouth Daily., Disp: 90 tablet, Rfl: 3  •  amLODIPine (NORVASC) 10 MG tablet, TAKE 1 TABLET EVERY DAY, Disp: 90 tablet, Rfl: 3  •  atorvastatin (LIPITOR) 20 MG tablet, TAKE 1 TABLET EVERY DAY, Disp: 90 tablet, Rfl: 3  •  benazepril (LOTENSIN) 40 MG tablet, Take 1 tablet by mouth Daily., Disp: 90 tablet, Rfl: 3  •  Calcium Carbonate-Vitamin D (CALCIUM + D PO), Take 1 tablet by mouth Daily., Disp: , Rfl:   •  CBD oil (cannabidiol) capsule, 20mg oral at QHS, Disp: , Rfl:   •  Cholecalciferol (VITAMIN D) 1000 UNITS tablet, Take 1,000 Units by mouth Daily., Disp: , Rfl:   •  diclofenac (VOLTAREN) 75 MG EC tablet, TAKE 1 TABLET EVERY DAY, Disp: 90 tablet, Rfl: 0  •  fluticasone (FLONASE) 50 MCG/ACT nasal spray, 2 sprays into each nostril Daily., Disp: , Rfl:   •  Loratadine 10 MG capsule, Take 1 tablet by mouth Daily., Disp: , Rfl:   •  rivaroxaban (Xarelto) 20 MG tablet, Take 1 tablet by mouth Daily., Disp: 90 tablet, Rfl: 2     Objective     History of Present Illness     Review of Systems   Constitutional: Positive for fatigue.   HENT: Negative.    Respiratory: Positive for apnea.    Cardiovascular: Positive for leg swelling. Negative for palpitations.   Genitourinary: Negative.    Musculoskeletal: Positive for arthralgias.   Skin: Negative.    Neurological: Negative.    Psychiatric/Behavioral: Positive for sleep disturbance.       Physical Exam    ASSESSMENT CBC has a normal white cell count hemoglobin and hematocrit is essentially normal.  CMP has an elevated but stable creatinine of 1.64, sodium of 145 and is otherwise essentially normal.  Lipid panel  was controlled with total cholesterol 162, HDL 62 and LDL 82.  TSH is minimally elevated at 5.44 and free T4 is quite normal at 1.54.  I also reviewed the home sleep study report which shows significant nocturnal hypoxia as well as significant apneic episodes.  I reviewed Dr. Morrell's note in which he recommends trial of auto CPAP and I discussed this with the patient and do think that she should give it a try, especially in view of her other conditions.  #1-hypertension, not optimally controlled  #2-hyperlipidemia, well controlled  #3-paroxysmal atrial fibrillation, stable  #4-nocturnal hypoxia and obstructive sleep apnea, concur with recommendations for auto CPAP  #5-chronic kidney disease stage III, stable and asymptomatic  #6-elevated TSH but clinically euthyroid     Problem List Items Addressed This Visit        Cardiovascular and Mediastinum    Atrial fibrillation (CMS/HCC)    Hyperlipidemia - Primary    Hypertension    Relevant Medications    benazepril (LOTENSIN) 40 MG tablet    hydroCHLOROthiazide (HYDRODIURIL) 25 MG tablet       Respiratory    TODD (obstructive sleep apnea)    Sleep-related hypoxia       Musculoskeletal and Integument    Arthritis       Genitourinary    Stage 3 chronic kidney disease (CMS/HCC)    Relevant Medications    hydroCHLOROthiazide (HYDRODIURIL) 25 MG tablet       Other    TSH elevation          PLAN I am going to increase the patient's benazepril to 40 mg daily and she will continue her other medicines as now.  I did recommend that she try the auto CPAP for her obstructive sleep apnea and nocturnal hypoxia.  I encouraged the patient to get a flu shot which she plans on next week.  I plan on rechecking her in 6 months with a CBC, CMP, lipid panel, TSH and free T4    This was a telephone visit due to the COVID-19 pandemic, total time spent was 25 minutes    There are no Patient Instructions on file for this visit.  No follow-ups on file.

## 2021-01-13 ENCOUNTER — OFFICE VISIT (OUTPATIENT)
Dept: CARDIOLOGY | Facility: CLINIC | Age: 76
End: 2021-01-13

## 2021-01-13 VITALS
BODY MASS INDEX: 30.04 KG/M2 | WEIGHT: 198.2 LBS | HEART RATE: 55 BPM | DIASTOLIC BLOOD PRESSURE: 80 MMHG | SYSTOLIC BLOOD PRESSURE: 130 MMHG | HEIGHT: 68 IN

## 2021-01-13 DIAGNOSIS — Z91.89 AT RISK FOR AMIODARONE TOXICITY WITH LONG TERM USE: Primary | ICD-10-CM

## 2021-01-13 DIAGNOSIS — I48.11 LONGSTANDING PERSISTENT ATRIAL FIBRILLATION (HCC): ICD-10-CM

## 2021-01-13 DIAGNOSIS — Z79.899 AT RISK FOR AMIODARONE TOXICITY WITH LONG TERM USE: Primary | ICD-10-CM

## 2021-01-13 PROCEDURE — 99214 OFFICE O/P EST MOD 30 MIN: CPT | Performed by: INTERNAL MEDICINE

## 2021-01-13 RX ORDER — HYDROCHLOROTHIAZIDE 25 MG/1
25 TABLET ORAL DAILY
Qty: 90 TABLET | Refills: 3 | Status: SHIPPED | OUTPATIENT
Start: 2021-01-13 | End: 2021-12-14

## 2021-01-13 NOTE — PROGRESS NOTES
[facility]      Patient Name: Denise Staley  :1945  Age: 75 y.o.  Encounter Provider:  Angelito Velazquez Jr, MD      Chief Complaint:   Chief Complaint   Patient presents with   • Hypertension       Atrial Fibrillation  Symptoms are negative for chest pain, dizziness, palpitations and shortness of breath. Past medical history includes atrial fibrillation.   Hypertension  Associated symptoms include malaise/fatigue. Pertinent negatives include no chest pain, palpitations or shortness of breath.     Denise Staley is a 75 y.o. female past medical history of paroxysmal atrial fibrillation on amiodarone therapy long-term present for routine follow-up.      Last clinic visit: Patient has been doing fairly well but feels functional capacity is decreased as she is unable to participate in her weekly aquatic therapy sessions.  She is limited in her ability to walk secondary to severe osteoarthritis and has not been getting her periodic steroid injections for bilateral knees.  With her activity around the home she denies any chest pain, shortness of air or palpitations.  No dizziness or syncope.  No orthopnea, PND or edema above baseline.  She does know when she is in atrial fibrillation states that she was out of rhythm on  for a few hours which resolved after she took an extra amiodarone dose.  She has not been able to take beta-blocker in the past due to her very low resting heart rate.  She has no bleeding complications associated with rivaroxaban therapy.  Social and family history reviewed and are not pertinent to this clinic visit.    She is doing well since last visit.  Seen by her ophthalmologist with no evidence of amiodarone toxicity.  Chest x-ray was not performed since last visit.  TSH slightly elevated with normal T4.  LFTs within normal range.  She had 1 episode of atrial fibrillation per her report for approximately 5 days which ultimately resolved.  She is  "tolerating Xarelto well with no bleeding complications.  Social and family history reviewed are not pertinent to this clinic visit.    The following portions of the patient's history were reviewed and updated as appropriate: allergies, current medications, past family history, past medical history, past social history, past surgical history and problem list.      Review of Systems   Constitution: Positive for malaise/fatigue. Negative for fever.   Cardiovascular: Negative for chest pain, dyspnea on exertion, leg swelling, near-syncope and palpitations.   Respiratory: Negative for cough and shortness of breath.    Musculoskeletal: Positive for arthritis and joint pain.   Neurological: Negative for dizziness, light-headedness, numbness and paresthesias.   All other systems reviewed and are negative.        OBJECTIVE:   Vital Signs  Vitals:    01/13/21 1415   BP: 130/80   Pulse: 55     Estimated body mass index is 30.14 kg/m² as calculated from the following:    Height as of this encounter: 172.7 cm (68\").    Weight as of this encounter: 89.9 kg (198 lb 3.2 oz).    Physical Exam   Constitutional: She is oriented to person, place, and time. She appears well-developed and well-nourished.   HENT:   Head: Normocephalic and atraumatic.   Eyes: Pupils are equal, round, and reactive to light. Conjunctivae are normal.   Neck: No JVD present. No thyromegaly present.   Cardiovascular: Exam reveals no gallop and no friction rub.   No murmur heard.  Pulmonary/Chest: No respiratory distress. She exhibits no tenderness.   Abdominal: Bowel sounds are normal. She exhibits no distension.   Musculoskeletal:         General: No tenderness or edema.   Neurological: She is alert and oriented to person, place, and time.   Skin: No rash noted. No erythema.   Psychiatric: She has a normal mood and affect. Judgment normal.   Vitals reviewed.      Procedures          ASSESSMENT:     PAF  Chronic amiodarone use  Chronic anticoagulation " use    PLAN OF CARE:     1. PAF -c we will check chest x-ray.  We will check surveillance labs at next visit.  Continue Xarelto therapy.  2. Snoring and hypersomnia -diagnosed with mild sleep apnea but does not want to start CPAP therapy.    Return to clinic 12 months                 Discharge Medications          Accurate as of January 13, 2021  2:21 PM. If you have any questions, ask your nurse or doctor.            Continue These Medications      Instructions Start Date   amiodarone 200 MG tablet  Commonly known as: PACERONE   200 mg, Oral, Daily      amLODIPine 10 MG tablet  Commonly known as: NORVASC   TAKE 1 TABLET EVERY DAY      atorvastatin 20 MG tablet  Commonly known as: LIPITOR   TAKE 1 TABLET EVERY DAY      benazepril 40 MG tablet  Commonly known as: LOTENSIN   40 mg, Oral, Daily      CALCIUM + D PO   1 tablet, Oral, Daily      CBD oil capsule  Commonly known as: cannabidiol   20mg oral at QHS       diclofenac 75 MG EC tablet  Commonly known as: VOLTAREN   TAKE 1 TABLET EVERY DAY      fluticasone 50 MCG/ACT nasal spray  Commonly known as: FLONASE   2 sprays, Nasal, Daily      hydroCHLOROthiazide 25 MG tablet  Commonly known as: HYDRODIURIL   25 mg, Oral, Daily      Loratadine 10 MG capsule   1 tablet, Oral, Daily      rivaroxaban 20 MG tablet  Commonly known as: Xarelto   20 mg, Oral, Daily      TYLENOL ARTHRITIS PAIN PO   2 tablets, Oral, As Needed      Vitamin D 1000 units tablet   1,000 Units, Oral, Daily             Thank you for allowing me to participate in the care of your patient,      Sincerely,   Angelito Velazquez MD  [facility]  01/13/21  14:21 EST

## 2021-01-19 RX ORDER — DICLOFENAC SODIUM 75 MG/1
TABLET, DELAYED RELEASE ORAL
Qty: 90 TABLET | Refills: 1 | Status: SHIPPED | OUTPATIENT
Start: 2021-01-19 | End: 2021-07-14

## 2021-02-26 RX ORDER — ATORVASTATIN CALCIUM 20 MG/1
TABLET, FILM COATED ORAL
Qty: 90 TABLET | Refills: 0 | Status: SHIPPED | OUTPATIENT
Start: 2021-02-26 | End: 2021-03-09 | Stop reason: SDUPTHER

## 2021-03-02 LAB
ALBUMIN SERPL-MCNC: 4.2 G/DL (ref 3.7–4.7)
ALBUMIN/GLOB SERPL: 1.6 {RATIO} (ref 1.2–2.2)
ALP SERPL-CCNC: 58 IU/L (ref 39–117)
ALT SERPL-CCNC: 27 IU/L (ref 0–32)
AMBIG ABBREV CMP14 DEFAULT: NORMAL
AMBIG ABBREV LP DEFAULT: NORMAL
AST SERPL-CCNC: 21 IU/L (ref 0–40)
BASOPHILS # BLD AUTO: 0 X10E3/UL (ref 0–0.2)
BASOPHILS NFR BLD AUTO: 0 %
BILIRUB SERPL-MCNC: 0.5 MG/DL (ref 0–1.2)
BUN SERPL-MCNC: 23 MG/DL (ref 8–27)
BUN/CREAT SERPL: 17 (ref 12–28)
CALCIUM SERPL-MCNC: 10.3 MG/DL (ref 8.7–10.3)
CHLORIDE SERPL-SCNC: 106 MMOL/L (ref 96–106)
CHOLEST SERPL-MCNC: 200 MG/DL (ref 100–199)
CO2 SERPL-SCNC: 22 MMOL/L (ref 20–29)
CREAT SERPL-MCNC: 1.35 MG/DL (ref 0.57–1)
EOSINOPHIL # BLD AUTO: 0 X10E3/UL (ref 0–0.4)
EOSINOPHIL NFR BLD AUTO: 0 %
ERYTHROCYTE [DISTWIDTH] IN BLOOD BY AUTOMATED COUNT: 11.8 % (ref 11.7–15.4)
GLOBULIN SER CALC-MCNC: 2.6 G/DL (ref 1.5–4.5)
GLUCOSE SERPL-MCNC: 88 MG/DL (ref 65–99)
HCT VFR BLD AUTO: 44.9 % (ref 34–46.6)
HDLC SERPL-MCNC: 76 MG/DL
HGB BLD-MCNC: 15.2 G/DL (ref 11.1–15.9)
IMM GRANULOCYTES # BLD AUTO: 0 X10E3/UL (ref 0–0.1)
IMM GRANULOCYTES NFR BLD AUTO: 0 %
LDLC SERPL CALC-MCNC: 104 MG/DL (ref 0–99)
LYMPHOCYTES # BLD AUTO: 0.7 X10E3/UL (ref 0.7–3.1)
LYMPHOCYTES NFR BLD AUTO: 9 %
MCH RBC QN AUTO: 33.8 PG (ref 26.6–33)
MCHC RBC AUTO-ENTMCNC: 33.9 G/DL (ref 31.5–35.7)
MCV RBC AUTO: 100 FL (ref 79–97)
MONOCYTES # BLD AUTO: 0.6 X10E3/UL (ref 0.1–0.9)
MONOCYTES NFR BLD AUTO: 8 %
NEUTROPHILS # BLD AUTO: 5.7 X10E3/UL (ref 1.4–7)
NEUTROPHILS NFR BLD AUTO: 83 %
PLATELET # BLD AUTO: 293 X10E3/UL (ref 150–450)
POTASSIUM SERPL-SCNC: 4.4 MMOL/L (ref 3.5–5.2)
PROT SERPL-MCNC: 6.8 G/DL (ref 6–8.5)
RBC # BLD AUTO: 4.5 X10E6/UL (ref 3.77–5.28)
SODIUM SERPL-SCNC: 144 MMOL/L (ref 134–144)
T4 FREE SERPL-MCNC: 1.45 NG/DL (ref 0.82–1.77)
TRIGL SERPL-MCNC: 114 MG/DL (ref 0–149)
TSH SERPL DL<=0.005 MIU/L-ACNC: 4.72 UIU/ML (ref 0.45–4.5)
VLDLC SERPL CALC-MCNC: 20 MG/DL (ref 5–40)
WBC # BLD AUTO: 7 X10E3/UL (ref 3.4–10.8)

## 2021-03-09 ENCOUNTER — OFFICE VISIT (OUTPATIENT)
Dept: FAMILY MEDICINE CLINIC | Facility: CLINIC | Age: 76
End: 2021-03-09

## 2021-03-09 VITALS
SYSTOLIC BLOOD PRESSURE: 118 MMHG | DIASTOLIC BLOOD PRESSURE: 76 MMHG | WEIGHT: 198 LBS | HEIGHT: 68 IN | BODY MASS INDEX: 30.01 KG/M2 | HEART RATE: 57 BPM | TEMPERATURE: 98.2 F | OXYGEN SATURATION: 96 % | RESPIRATION RATE: 15 BRPM

## 2021-03-09 DIAGNOSIS — E66.09 CLASS 1 OBESITY DUE TO EXCESS CALORIES WITHOUT SERIOUS COMORBIDITY WITH BODY MASS INDEX (BMI) OF 30.0 TO 30.9 IN ADULT: ICD-10-CM

## 2021-03-09 DIAGNOSIS — M19.90 ARTHRITIS: ICD-10-CM

## 2021-03-09 DIAGNOSIS — I10 ESSENTIAL HYPERTENSION: Primary | ICD-10-CM

## 2021-03-09 DIAGNOSIS — M54.50 CHRONIC LOW BACK PAIN WITHOUT SCIATICA, UNSPECIFIED BACK PAIN LATERALITY: ICD-10-CM

## 2021-03-09 DIAGNOSIS — R79.89 TSH ELEVATION: ICD-10-CM

## 2021-03-09 DIAGNOSIS — I48.0 PAROXYSMAL ATRIAL FIBRILLATION (HCC): ICD-10-CM

## 2021-03-09 DIAGNOSIS — E78.5 HYPERLIPIDEMIA, UNSPECIFIED HYPERLIPIDEMIA TYPE: ICD-10-CM

## 2021-03-09 DIAGNOSIS — N18.30 STAGE 3 CHRONIC KIDNEY DISEASE, UNSPECIFIED WHETHER STAGE 3A OR 3B CKD (HCC): ICD-10-CM

## 2021-03-09 DIAGNOSIS — R60.9 DEPENDENT EDEMA: ICD-10-CM

## 2021-03-09 DIAGNOSIS — G89.29 CHRONIC LOW BACK PAIN WITHOUT SCIATICA, UNSPECIFIED BACK PAIN LATERALITY: ICD-10-CM

## 2021-03-09 PROCEDURE — 99214 OFFICE O/P EST MOD 30 MIN: CPT | Performed by: INTERNAL MEDICINE

## 2021-03-09 RX ORDER — ATORVASTATIN CALCIUM 20 MG/1
20 TABLET, FILM COATED ORAL DAILY
Qty: 90 TABLET | Refills: 3 | Status: SHIPPED | OUTPATIENT
Start: 2021-03-09 | End: 2022-05-25

## 2021-03-09 NOTE — PROGRESS NOTES
"Subjective   Denise Staley is a 75 y.o. female. Patient is here today for follow-up on her hypertension, hyperlipidemia, paroxysmal atrial fibrillation, obesity, chronic kidney disease stage III and arthritis.  Patient's generally been stable and has lost about 8 or 9 pounds.  She has had no cardiac problems and otherwise feels well.  She is getting knee injections regularly.  Chief Complaint   Patient presents with   • Hyperlipidemia          Vitals:    03/09/21 0948   BP: 118/76   Pulse: 57   Resp: 15   Temp: 98.2 °F (36.8 °C)   SpO2: 96%     Body mass index is 30.11 kg/m².  The following portions of the patient's history were reviewed and updated as appropriate: allergies, current medications, past family history, past medical history, past social history, past surgical history and problem list.    Past Medical History:   Diagnosis Date   • Arthritis    • Atrial fibrillation (CMS/HCC)    • Fatigue    • Health care maintenance    • Hyperlipidemia    • Hypertension    • Lower back pain    • Obesity    • TODD (obstructive sleep apnea) 08/04/2020    Home sleep study.  Weight 209 pounds.  At least mild TODD with AHI of 13.8 events per hour.  Low O2 saturation 69% and sleep-related hypoxia present for 234 minutes.  The patient snored 18% of the total monitoring time.   • Pelvic pain in female    • SOB (shortness of breath) on exertion    • TSH elevation    • Ventricular hypertrophy       No Known Allergies   Social History     Socioeconomic History   • Marital status:      Spouse name: Not on file   • Number of children: Not on file   • Years of education: Not on file   • Highest education level: Not on file   Tobacco Use   • Smoking status: Former Smoker   • Smokeless tobacco: Never Used   • Tobacco comment: no caffeine use   Substance and Sexual Activity   • Alcohol use: Yes     Comment: \"rare\"   • Drug use: Defer   • Sexual activity: Defer        Current Outpatient Medications:   •  Acetaminophen " (TYLENOL ARTHRITIS PAIN PO), Take 2 tablets by mouth As Needed., Disp: , Rfl:   •  amiodarone (PACERONE) 200 MG tablet, Take 1 tablet by mouth Daily., Disp: 90 tablet, Rfl: 3  •  amLODIPine (NORVASC) 10 MG tablet, TAKE 1 TABLET EVERY DAY, Disp: 90 tablet, Rfl: 3  •  atorvastatin (LIPITOR) 20 MG tablet, Take 1 tablet by mouth Daily., Disp: 90 tablet, Rfl: 3  •  benazepril (LOTENSIN) 40 MG tablet, Take 1 tablet by mouth Daily., Disp: 90 tablet, Rfl: 3  •  Calcium Carbonate-Vitamin D (CALCIUM + D PO), Take 1 tablet by mouth Daily., Disp: , Rfl:   •  CBD oil (cannabidiol) capsule, 20mg oral at QHS, Disp: , Rfl:   •  Cholecalciferol (VITAMIN D) 1000 UNITS tablet, Take 1,000 Units by mouth Daily., Disp: , Rfl:   •  diclofenac (VOLTAREN) 75 MG EC tablet, TAKE 1 TABLET EVERY DAY, Disp: 90 tablet, Rfl: 1  •  fluticasone (FLONASE) 50 MCG/ACT nasal spray, 2 sprays into each nostril Daily., Disp: , Rfl:   •  hydroCHLOROthiazide (HYDRODIURIL) 25 MG tablet, Take 1 tablet by mouth Daily., Disp: 90 tablet, Rfl: 3  •  Loratadine 10 MG capsule, Take 1 tablet by mouth Daily., Disp: , Rfl:   •  rivaroxaban (Xarelto) 20 MG tablet, Take 1 tablet by mouth Daily., Disp: 90 tablet, Rfl: 2     Objective     History of Present Illness     Review of Systems   Constitutional: Negative.    HENT: Negative.    Respiratory: Negative.    Cardiovascular: Negative.    Gastrointestinal: Negative.    Genitourinary: Negative.    Musculoskeletal: Negative.    Skin: Negative.    Neurological: Negative.    Hematological: Negative.    Psychiatric/Behavioral: Negative.        Physical Exam  Vitals and nursing note reviewed.   Constitutional:       General: She is not in acute distress.     Appearance: Normal appearance. She is not ill-appearing.   HENT:      Head: Normocephalic and atraumatic.   Eyes:      General: No scleral icterus.     Conjunctiva/sclera: Conjunctivae normal.   Cardiovascular:      Rate and Rhythm: Normal rate and regular rhythm.       Heart sounds: Normal heart sounds.   Pulmonary:      Effort: Pulmonary effort is normal. No respiratory distress.      Breath sounds: Normal breath sounds. No wheezing or rales.   Musculoskeletal:         General: Normal range of motion.      Cervical back: Normal range of motion and neck supple.   Skin:     General: Skin is warm and dry.   Neurological:      General: No focal deficit present.      Mental Status: She is alert and oriented to person, place, and time.   Psychiatric:         Mood and Affect: Mood normal.         Behavior: Behavior normal.         ASSESSMENT CBC was normal.  CMP has a creatinine of 1.35 and is otherwise normal.  Lipid panel is a bit higher than usual with total cholesterol of 200, HDL 76 and .  TSH was just minimally elevated but free T4 is quite normal.  #1-hypertension controlled on medication  #2-hyperlipidemia, generally controlled on medication  #3-paroxysmal atrial fibrillation with regular rhythm today  #4-obesity with about 8 or 9 pound weight loss  #5-chronic kidney disease stage III, asymptomatic and stable  #6-osteoarthritis in the knees, stable on injections     Problems Addressed this Visit        Cardiac and Vasculature    Atrial fibrillation (CMS/[facility])    Hyperlipidemia    Relevant Medications    atorvastatin (LIPITOR) 20 MG tablet    Hypertension - Primary       Genitourinary and Reproductive     Stage 3 chronic kidney disease (CMS/[facility])       Musculoskeletal and Injuries    Low back pain       Symptoms and Signs    Dependent edema    TSH elevation      Diagnoses       Codes Comments    Essential hypertension    -  Primary ICD-10-CM: I10  ICD-9-CM: 401.9     Hyperlipidemia, unspecified hyperlipidemia type     ICD-10-CM: E78.5  ICD-9-CM: 272.4     Paroxysmal atrial fibrillation (CMS/[facility])     ICD-10-CM: I48.0  ICD-9-CM: 427.31     Stage 3 chronic kidney disease, unspecified whether stage 3a or 3b CKD (CMS/[facility])     ICD-10-CM: N18.30  ICD-9-CM: 585.3     Chronic low  back pain without sciatica, unspecified back pain laterality     ICD-10-CM: M54.5, G89.29  ICD-9-CM: 724.2, 338.29     Dependent edema     ICD-10-CM: R60.9  ICD-9-CM: 782.3     TSH elevation     ICD-10-CM: R79.89  ICD-9-CM: 794.5           PLAN patient will continue current medicines as now and I would like to recheck her in 6 months with a CBC, CMP, lipid panel, TSH and hepatitis C screen per protocol    There are no Patient Instructions on file for this visit.  Return in about 6 months (around 9/9/2021) for with labs.

## 2021-05-19 RX ORDER — AMLODIPINE BESYLATE 10 MG/1
TABLET ORAL
Qty: 90 TABLET | Refills: 3 | Status: SHIPPED | OUTPATIENT
Start: 2021-05-19 | End: 2022-04-22

## 2021-07-14 RX ORDER — DICLOFENAC SODIUM 75 MG/1
TABLET, DELAYED RELEASE ORAL
Qty: 90 TABLET | Refills: 1 | Status: SHIPPED | OUTPATIENT
Start: 2021-07-14 | End: 2022-02-18

## 2021-07-20 ENCOUNTER — TELEPHONE (OUTPATIENT)
Dept: CARDIOLOGY | Facility: CLINIC | Age: 76
End: 2021-07-20

## 2021-07-20 NOTE — TELEPHONE ENCOUNTER
"Muriel,    Ms. Staley called to report that on 5/17/21 she went into afib. Typically she can convert herself out of this with coughing or bearing down. This hasn't been successful this time.  She states that she \"just feels bad and tired.  Denies any CP, SOA. She does note ankle and lower extremity edema that typically resolves over night. The edema has been happening since before 5/17.    She doesn't monitor her vitals but says she is usually around 120s/80s and HR 50s.    I verified her meds on the mar are correct. She does tell me that the first week she was in this afib, she started taking 2 doses of amioderone.  Dr. Worthy had told her to do so in the past. She stopped the extra dose after a week because it wasn't helping.    Please advise.    Thank you,  Micki Issa RN  [facility]  Triage    "

## 2021-07-20 NOTE — TELEPHONE ENCOUNTER
Patient notified and scheduled.    Thank you,  Micki Issa RN  [facility]  Triage

## 2021-07-21 ENCOUNTER — OFFICE VISIT (OUTPATIENT)
Dept: CARDIOLOGY | Facility: CLINIC | Age: 76
End: 2021-07-21

## 2021-07-21 VITALS
HEIGHT: 68 IN | WEIGHT: 202 LBS | HEART RATE: 83 BPM | DIASTOLIC BLOOD PRESSURE: 70 MMHG | SYSTOLIC BLOOD PRESSURE: 126 MMHG | BODY MASS INDEX: 30.62 KG/M2

## 2021-07-21 DIAGNOSIS — Z79.899 AT RISK FOR AMIODARONE TOXICITY WITH LONG TERM USE: Primary | ICD-10-CM

## 2021-07-21 DIAGNOSIS — Z91.89 AT RISK FOR AMIODARONE TOXICITY WITH LONG TERM USE: Primary | ICD-10-CM

## 2021-07-21 DIAGNOSIS — I10 ESSENTIAL HYPERTENSION: ICD-10-CM

## 2021-07-21 DIAGNOSIS — I48.0 PAROXYSMAL ATRIAL FIBRILLATION (HCC): ICD-10-CM

## 2021-07-21 PROCEDURE — 93000 ELECTROCARDIOGRAM COMPLETE: CPT | Performed by: NURSE PRACTITIONER

## 2021-07-21 PROCEDURE — 99214 OFFICE O/P EST MOD 30 MIN: CPT | Performed by: NURSE PRACTITIONER

## 2021-07-21 NOTE — PROGRESS NOTES
"Date of Office Visit: 21  Encounter Provider: ELIEL Perez  Place of Service: [facility] CARDIOLOGY  Patient Name: Denise Staley  :1945    Chief Complaint   Patient presents with   • Paroxysmal atrial fibrillation   • Follow-up   :     HPI: Denise Staley is a 76 y.o. female  with history of paroxysmal atrial fibrillation, long-term amiodarone use, obstructive sleep apnea, and long-term anticoagulation use.  She was previously followed by Dr. Anshul Worthy.  She is now followed by Dr. Velazquez    She requested to be seen sooner due to atrial fibrillation that she states started 2 months ago and has been consistent.  She started to double on amiodarone and she took 400 mg once a day for a week but that did not convert her.  She is not missed any doses of Xarelto.  She has had issues with lower extremity swelling for at least 6 months that improves with elevation.  She has less swelling the less she is on her feet or sitting for prolonged periods of time.  She denies chest pain tightness pressure shortness of breath.  She does report increased fatigue and unsteady gait while in A. fib.          No Known Allergies        Family and social history reviewed.     Review of Systems   Constitutional: Positive for malaise/fatigue.   Cardiovascular: Positive for leg swelling.   Musculoskeletal: Positive for joint pain.     All other systems were reviewed and are negative          Objective:     Vitals:    21 1547   BP: 126/70   BP Location: Left arm   Patient Position: Sitting   Pulse: 83   Weight: 91.6 kg (202 lb)   Height: 172.7 cm (68\")     Body mass index is 30.71 kg/m².    PHYSICAL EXAM:  Constitutional:       General: Not in acute distress.     Appearance: Well-developed. Not diaphoretic.   HENT:      Head: Normocephalic.   Pulmonary:      Effort: Pulmonary effort is normal. No respiratory distress.      Breath sounds: Normal breath sounds. No wheezing. No " rhonchi. No rales.   Cardiovascular:      Normal rate. Irregularly irregular rhythm.   Pulses:     Radial: 2+ bilaterally.  Edema:     Pretibial: bilateral 2+ edema of the pretibial area.     Feet: bilateral 2+ edema of the feet.  Skin:     General: Skin is warm and dry. There is no cyanosis.      Findings: No rash.   Neurological:      Mental Status: Alert and oriented to person, place, and time.   Psychiatric:         Behavior: Behavior normal.         Thought Content: Thought content normal.         Judgment: Judgment normal.           ECG 12 Lead    Date/Time: 7/21/2021 5:53 PM  Performed by: Muriel Saeed APRN  Authorized by: Muriel Saeed APRN   Comparison: compared with previous ECG   Similar to previous ECG  Rhythm: atrial fibrillation  Rate: normal    Clinical impression: abnormal EKG              Current Outpatient Medications   Medication Sig Dispense Refill   • Acetaminophen (TYLENOL ARTHRITIS PAIN PO) Take 2 tablets by mouth As Needed.     • amiodarone (PACERONE) 200 MG tablet Take 1 tablet by mouth Daily. 90 tablet 3   • amLODIPine (NORVASC) 10 MG tablet TAKE 1 TABLET EVERY DAY 90 tablet 3   • atorvastatin (LIPITOR) 20 MG tablet Take 1 tablet by mouth Daily. 90 tablet 3   • benazepril (LOTENSIN) 40 MG tablet Take 1 tablet by mouth Daily. 90 tablet 3   • Calcium Carbonate-Vitamin D (CALCIUM + D PO) Take 1 tablet by mouth Daily.     • Cholecalciferol (VITAMIN D) 1000 UNITS tablet Take 1,000 Units by mouth Daily.     • diclofenac (VOLTAREN) 75 MG EC tablet TAKE 1 TABLET EVERY DAY 90 tablet 1   • fluticasone (FLONASE) 50 MCG/ACT nasal spray 2 sprays into each nostril Daily.     • FOLIC ACID PO Take  by mouth Daily.     • hydroCHLOROthiazide (HYDRODIURIL) 25 MG tablet Take 1 tablet by mouth Daily. 90 tablet 3   • Loratadine 10 MG capsule Take 1 tablet by mouth Daily.     • rivaroxaban (Xarelto) 20 MG tablet Take 1 tablet by mouth Daily. 90 tablet 2     No current facility-administered medications for this  visit.     Assessment:       Diagnosis Plan   1. At risk for amiodarone toxicity with long term use     2. Essential hypertension     3. Paroxysmal atrial fibrillation (CMS/HCC)          Orders Placed This Encounter   Procedures   • ECG 12 Lead     This order was created via procedure documentation     Order Specific Question:   Release to patient     Answer:   Immediate         Plan:       One.  76-year-old female with paroxysmal atrial fibrillation.  She is in atrial fibrillation despite amiodarone 200 mg daily.  She tried 400 mg once daily for a week but that did not convert her.  She has not missed any doses of Xarelto which she is needed for stroke prevention as she is high risk.  On Saturday morning she will take 400 mg twice daily amiodarone and is to return on Tuesday afternoon for EKG check in order to check QT interval and see if she has converted.  If she has converted, will decrease amiodarone slowly and possibly maintain her on 300 mg a day  -she never had CXR that was ordered in January. I will see if she can have that done on Tuesday when she returns  - liver enzymes were WNL 03/2021  2.  History of abnormal TSH-she is not on thyroid replacement we will need to keep following this  3.  Lower extremity edema appears more dependent in nature as this improves with elevation in the less she is on her feet.  Encouraged that she consider wearing compression stockings during the day especially for long car rides.  Of given her the okay to double on her hydrochlorothiazide until she sees me on Tuesday  4. HTN blood pressure appears well controlled          It has been a pleasure to participate in this patient's care.      Thank you,  ELIEL Perez      **I used Dragon to dictate this note:**

## 2021-07-27 ENCOUNTER — CLINICAL SUPPORT (OUTPATIENT)
Dept: CARDIOLOGY | Facility: CLINIC | Age: 76
End: 2021-07-27

## 2021-07-27 DIAGNOSIS — I48.19 ATRIAL FIBRILLATION, PERSISTENT (HCC): Primary | ICD-10-CM

## 2021-07-27 DIAGNOSIS — Z51.81 ENCOUNTER FOR THERAPEUTIC DRUG LEVEL MONITORING: ICD-10-CM

## 2021-07-27 PROCEDURE — 93000 ELECTROCARDIOGRAM COMPLETE: CPT | Performed by: NURSE PRACTITIONER

## 2021-07-27 RX ORDER — AMIODARONE HYDROCHLORIDE 200 MG/1
200 TABLET ORAL DAILY
COMMUNITY
Start: 2021-07-21 | End: 2021-08-12

## 2021-07-27 RX ORDER — HYDROCHLOROTHIAZIDE 25 MG/1
25 TABLET ORAL DAILY
COMMUNITY
Start: 2021-07-21 | End: 2021-08-26

## 2021-07-27 NOTE — PROGRESS NOTES
Patient came in today for an EKG check after having her amiodarone increased from 200mg daily to 400mg twice daily. Her medications were reconciled.    She said that her swelling is a little better with the recent increase in her HCTZ from 25mg qd to 50mg qd. She also mentions massaging her legs and elevating also is helping. I did notice her left leg was weeping a small bit as well.    The patient's EKG was cleared for her to leave by Dr. Velazquez, but he wanted ELIEL Perez to call the patient back with any further instructions.

## 2021-07-27 NOTE — PROGRESS NOTES
Procedure     ECG 12 Lead    Date/Time: 7/27/2021 3:19 PM  Performed by: Muriel Saeed APRN  Authorized by: Muriel Saeed APRN   Comparison: compared with previous ECG   Similar to previous ECG  Rhythm: atrial fibrillation  Rate: normal    Clinical impression: abnormal EKG  Comments: QTc stable            Called and discussed with patient QTc interval is stable on amiodarone 400 mg twice daily however she remains in atrial fibrillation.  She states she is not as symptomatic as she was prior to dose increase.  We also discussed improved swelling with higher dose hydrochlorothiazide.  I informed her that I recommend an electrophysiology evaluation due to history of multiple cardioversions and symptomatic atrial fibrillation despite high-dose amiodarone.  Patient seems agreeable but would like to give amiodarone 400 mg twice daily for another week to see if she may convert.  In addition to place an EP referral I have also made arrangement for her to follow-up in 1 week with another EKG check. Will also need BMP for higher dose HCTZ at that time.

## 2021-08-03 ENCOUNTER — CLINICAL SUPPORT (OUTPATIENT)
Dept: CARDIOLOGY | Facility: CLINIC | Age: 76
End: 2021-08-03

## 2021-08-03 DIAGNOSIS — I48.19 ATRIAL FIBRILLATION, PERSISTENT (HCC): Primary | ICD-10-CM

## 2021-08-03 PROCEDURE — 93000 ELECTROCARDIOGRAM COMPLETE: CPT | Performed by: NURSE PRACTITIONER

## 2021-08-03 NOTE — PROGRESS NOTES
Procedure     ECG 12 Lead    Date/Time: 8/3/2021 2:30 PM  Performed by: Muriel Saeed APRN  Authorized by: Muriel Saeed APRN   Comparison: compared with previous ECG   Similar to previous ECG  Rhythm: atrial fibrillation  QRS axis: left    Clinical impression: abnormal EKG  Comments: qtc stable           Patient presents today for a EKG follow-up. Muriel Saeed increased her Amiodarone 400mg BID. Patient stated that since she has increased her medication she feels more fatigue. ekg still shows a fib despite increased amiodarone.  Muriel spoke with patient. She would like for patient to go back to Amiodarone 200 mg daily.  She is to continue elevating her lower extremities and wear compression stockings to help minimize swelling.  She will go back to hydrochlorothiazide 25 mg daily.  Patient verbalized understanding. She is to keep her apt with our electrophysiology  08/26/21. Allergies reviewed. I corrected medication change : Amiodarone 200 mg daily.

## 2021-08-12 RX ORDER — AMIODARONE HYDROCHLORIDE 200 MG/1
TABLET ORAL
Qty: 90 TABLET | Refills: 2 | Status: SHIPPED | OUTPATIENT
Start: 2021-08-12 | End: 2021-11-01

## 2021-08-12 RX ORDER — BENAZEPRIL HYDROCHLORIDE 40 MG/1
TABLET, FILM COATED ORAL
Qty: 90 TABLET | Refills: 3 | Status: SHIPPED | OUTPATIENT
Start: 2021-08-12 | End: 2022-07-28

## 2021-08-26 ENCOUNTER — OFFICE VISIT (OUTPATIENT)
Dept: CARDIOLOGY | Facility: CLINIC | Age: 76
End: 2021-08-26

## 2021-08-26 VITALS
WEIGHT: 197 LBS | SYSTOLIC BLOOD PRESSURE: 120 MMHG | HEIGHT: 68 IN | HEART RATE: 83 BPM | BODY MASS INDEX: 29.86 KG/M2 | DIASTOLIC BLOOD PRESSURE: 78 MMHG

## 2021-08-26 DIAGNOSIS — N18.30 STAGE 3 CHRONIC KIDNEY DISEASE, UNSPECIFIED WHETHER STAGE 3A OR 3B CKD (HCC): ICD-10-CM

## 2021-08-26 DIAGNOSIS — I48.0 PAROXYSMAL ATRIAL FIBRILLATION (HCC): Primary | ICD-10-CM

## 2021-08-26 DIAGNOSIS — G89.29 CHRONIC LOW BACK PAIN WITHOUT SCIATICA, UNSPECIFIED BACK PAIN LATERALITY: ICD-10-CM

## 2021-08-26 DIAGNOSIS — M54.50 CHRONIC LOW BACK PAIN WITHOUT SCIATICA, UNSPECIFIED BACK PAIN LATERALITY: ICD-10-CM

## 2021-08-26 PROCEDURE — 99214 OFFICE O/P EST MOD 30 MIN: CPT | Performed by: INTERNAL MEDICINE

## 2021-08-26 PROCEDURE — 93000 ELECTROCARDIOGRAM COMPLETE: CPT | Performed by: INTERNAL MEDICINE

## 2021-08-26 RX ORDER — DIPHENOXYLATE HYDROCHLORIDE AND ATROPINE SULFATE 2.5; .025 MG/1; MG/1
1 TABLET ORAL DAILY
COMMUNITY

## 2021-08-26 NOTE — PROGRESS NOTES
Date of Office Visit: 2021  Encounter Provider: Jah Lopez MD  Place of Service: [facility] CARDIOLOGY  Patient Name: Denise Staley  :1945    Chief Complaint   Patient presents with   • Atrial Fibrillation     New Patient Consult / AL ref   :     HPI: Denise Staley is a 76 y.o. female who presents today for persistent atrial fibrillation.     She has approximately 20 year history of atrial fibrillation, and reports several prior cardioversions, the last one in .   She has been in atrial fibrillation since May.  This is the longest episode of atrial fibrillation.     She has been on amiodarone for several years, does not recall ever trying a different antiarrhythmic drug.      Even when on amiodarone, she was having occasional breakthrough episodes of atrial fibrillation.     She has severe back pain, which limits activity.   She is anticoagulation with xarelto.      She reports tired and grogginess when in atrial fibrillation.           Past Medical History:   Diagnosis Date   • Arthritis    • Atrial fibrillation (CMS/HCC)    • Fatigue    • Health care maintenance    • Hyperlipidemia    • Hypertension    • Lower back pain    • Obesity    • TODD (obstructive sleep apnea) 2020    Home sleep study.  Weight 209 pounds.  At least mild TODD with AHI of 13.8 events per hour.  Low O2 saturation 69% and sleep-related hypoxia present for 234 minutes.  The patient snored 18% of the total monitoring time.   • Pelvic pain in female    • SOB (shortness of breath) on exertion    • TSH elevation    • Ventricular hypertrophy        Past Surgical History:   Procedure Laterality Date   • CARDIOVERSION      MULTIPLE   • TONSILLECTOMY         Social History     Socioeconomic History   • Marital status:      Spouse name: Not on file   • Number of children: Not on file   • Years of education: Not on file   • Highest education level: Not on file   Tobacco Use   •  Smoking status: Former Smoker   • Smokeless tobacco: Never Used   • Tobacco comment: no caffeine use   Substance and Sexual Activity   • Alcohol use: Not Currently   • Drug use: Defer   • Sexual activity: Defer       Family History   Problem Relation Age of Onset   • Lung cancer Mother 75   • Ovarian cancer Sister         60's   • Breast cancer Maternal Aunt         70's   • No Known Problems Father    • No Known Problems Maternal Grandmother    • No Known Problems Maternal Grandfather    • No Known Problems Paternal Grandmother    • No Known Problems Paternal Grandfather    • Heart disease Brother         open heart surgery    • Heart attack Sister    • Heart disease Sister        Review of Systems   Constitutional: Negative for malaise/fatigue.   HENT: Negative.    Eyes: Negative.    Cardiovascular: Positive for palpitations. Negative for chest pain, dyspnea on exertion, leg swelling and near-syncope.   Respiratory: Negative for cough and shortness of breath.    Endocrine: Negative.    Hematologic/Lymphatic: Negative.    Skin: Negative.    Musculoskeletal: Negative.    Gastrointestinal: Negative.    Genitourinary: Negative.    Neurological: Negative.  Negative for weakness.   Psychiatric/Behavioral: Negative.    Allergic/Immunologic: Negative.        No Known Allergies      Current Outpatient Medications:   •  Acetaminophen (TYLENOL ARTHRITIS PAIN PO), Take 2 tablets by mouth As Needed., Disp: , Rfl:   •  amiodarone (PACERONE) 200 MG tablet, TAKE 1 TABLET EVERY DAY, Disp: 90 tablet, Rfl: 2  •  amLODIPine (NORVASC) 10 MG tablet, TAKE 1 TABLET EVERY DAY, Disp: 90 tablet, Rfl: 3  •  atorvastatin (LIPITOR) 20 MG tablet, Take 1 tablet by mouth Daily., Disp: 90 tablet, Rfl: 3  •  benazepril (LOTENSIN) 40 MG tablet, TAKE 1 TABLET EVERY DAY, Disp: 90 tablet, Rfl: 3  •  Calcium Carbonate-Vitamin D (CALCIUM + D PO), Take 1 tablet by mouth Daily., Disp: , Rfl:   •  Cholecalciferol (VITAMIN D) 1000 UNITS tablet, Take 1,000  "Units by mouth Daily., Disp: , Rfl:   •  diclofenac (VOLTAREN) 75 MG EC tablet, TAKE 1 TABLET EVERY DAY, Disp: 90 tablet, Rfl: 1  •  fluticasone (FLONASE) 50 MCG/ACT nasal spray, 2 sprays into each nostril Daily., Disp: , Rfl:   •  FOLIC ACID PO, Take  by mouth Daily., Disp: , Rfl:   •  hydroCHLOROthiazide (HYDRODIURIL) 25 MG tablet, Take 1 tablet by mouth Daily., Disp: 90 tablet, Rfl: 3  •  Loratadine 10 MG capsule, Take 1 tablet by mouth Daily., Disp: , Rfl:   •  multivitamin (MULTI-VITAMIN DAILY PO), Take  by mouth Daily., Disp: , Rfl:   •  rivaroxaban (Xarelto) 20 MG tablet, Take 1 tablet by mouth Daily., Disp: 90 tablet, Rfl: 2      Objective:     Vitals:    08/26/21 1252   BP: 120/78   Pulse: 83   Weight: 89.4 kg (197 lb)   Height: 172.7 cm (68\")     Body mass index is 29.95 kg/m².    PHYSICAL EXAM:    Vitals and nursing note reviewed.   Constitutional:       Appearance: Normal and healthy appearance.   Eyes:      General: Lids are normal.   HENT:      Head: Normocephalic and atraumatic.   Pulmonary:      Effort: Pulmonary effort is normal.   Cardiovascular:      Normal rate. Irregular rhythm.   Edema:     Peripheral edema absent.   Skin:     General: Skin is warm.   Neurological:      General: No focal deficit present.      Mental Status: Alert, oriented to person, place, and time and oriented to person, place and time.   Psychiatric:         Attention and Perception: Attention and perception normal.         Mood and Affect: Mood and affect normal.         Behavior: Behavior is cooperative.             ECG 12 Lead    Date/Time: 8/26/2021 1:30 PM  Performed by: Jah Lopez MD  Authorized by: Jah Lopez MD   Comparison: compared with previous ECG from 8/3/2021  Similar to previous ECG  Rhythm: atrial fibrillation        I reviewed her echocardiogram results from 2018.  It showed normal ejection fraction, moderate to severe left atrial enlargement    I reviewed her basic metabolic panel.  If " she has moderate to severe renal dysfunction with a creatinine of 1.35.      Assessment:       Diagnosis Plan   1. Paroxysmal atrial fibrillation (CMS/HCC)     2. Chronic low back pain without sciatica, unspecified back pain laterality     3. Stage 3 chronic kidney disease, unspecified whether stage 3a or 3b CKD (CMS/HCC)            Plan:       Patient and I had a long discussion about her diagnosis of atrial fibrillation and possible treatment options.  She has had longstanding atrial fibrillation, now persistent for the past several months.  We discussed options including 1.)  Repeat cardioversion, continue amiodarone 2.)  Switch to a different antiarrhythmic 3.)  Ablation 4.)  Pursuing rate control strategy.  We discussed potential long-term side effects with amiodarone therapy.  We discussed that her kidney function limits options for other antiarrhythmics.  We discussed success versus risk of ablation.  After discussion we have decided for now to pursue a rate control strategy.  We decided that her back function is the main factor impacting her functional status currently not atrial fibrillation.  I will have her stop amiodarone now.  We discussed a several month half-life of amiodarone.  She is going to seek care for her back pain and follow-up in 3 months.    As always, it has been a pleasure to participate in your patient's care.      Sincerely,         Jah Lopez MD

## 2021-08-31 LAB
ALBUMIN SERPL-MCNC: 3.8 G/DL (ref 3.7–4.7)
ALBUMIN/GLOB SERPL: 1.5 {RATIO} (ref 1.2–2.2)
ALP SERPL-CCNC: 73 IU/L (ref 48–121)
ALT SERPL-CCNC: 30 IU/L (ref 0–32)
AMBIG ABBREV CMP14 DEFAULT: NORMAL
AMBIG ABBREV LP DEFAULT: NORMAL
AST SERPL-CCNC: 30 IU/L (ref 0–40)
BASOPHILS # BLD AUTO: 0 X10E3/UL (ref 0–0.2)
BASOPHILS NFR BLD AUTO: 1 %
BILIRUB SERPL-MCNC: 0.8 MG/DL (ref 0–1.2)
BUN SERPL-MCNC: 18 MG/DL (ref 8–27)
BUN/CREAT SERPL: 11 (ref 12–28)
CALCIUM SERPL-MCNC: 10 MG/DL (ref 8.7–10.3)
CHLORIDE SERPL-SCNC: 106 MMOL/L (ref 96–106)
CHOLEST SERPL-MCNC: 153 MG/DL (ref 100–199)
CO2 SERPL-SCNC: 21 MMOL/L (ref 20–29)
CREAT SERPL-MCNC: 1.61 MG/DL (ref 0.57–1)
EOSINOPHIL # BLD AUTO: 0.1 X10E3/UL (ref 0–0.4)
EOSINOPHIL NFR BLD AUTO: 2 %
ERYTHROCYTE [DISTWIDTH] IN BLOOD BY AUTOMATED COUNT: 12.1 % (ref 11.7–15.4)
GLOBULIN SER CALC-MCNC: 2.5 G/DL (ref 1.5–4.5)
GLUCOSE SERPL-MCNC: 87 MG/DL (ref 65–99)
HCT VFR BLD AUTO: 39.7 % (ref 34–46.6)
HDLC SERPL-MCNC: 59 MG/DL
HGB BLD-MCNC: 13.2 G/DL (ref 11.1–15.9)
IMM GRANULOCYTES # BLD AUTO: 0 X10E3/UL (ref 0–0.1)
IMM GRANULOCYTES NFR BLD AUTO: 0 %
LDLC SERPL CALC-MCNC: 75 MG/DL (ref 0–99)
LYMPHOCYTES # BLD AUTO: 1 X10E3/UL (ref 0.7–3.1)
LYMPHOCYTES NFR BLD AUTO: 17 %
MCH RBC QN AUTO: 33.1 PG (ref 26.6–33)
MCHC RBC AUTO-ENTMCNC: 33.2 G/DL (ref 31.5–35.7)
MCV RBC AUTO: 100 FL (ref 79–97)
MONOCYTES # BLD AUTO: 0.5 X10E3/UL (ref 0.1–0.9)
MONOCYTES NFR BLD AUTO: 9 %
NEUTROPHILS # BLD AUTO: 4 X10E3/UL (ref 1.4–7)
NEUTROPHILS NFR BLD AUTO: 71 %
PLATELET # BLD AUTO: 320 X10E3/UL (ref 150–450)
POTASSIUM SERPL-SCNC: 4.3 MMOL/L (ref 3.5–5.2)
PROT SERPL-MCNC: 6.3 G/DL (ref 6–8.5)
RBC # BLD AUTO: 3.99 X10E6/UL (ref 3.77–5.28)
SODIUM SERPL-SCNC: 144 MMOL/L (ref 134–144)
T4 FREE SERPL-MCNC: 1.8 NG/DL (ref 0.82–1.77)
TRIGL SERPL-MCNC: 102 MG/DL (ref 0–149)
TSH SERPL DL<=0.005 MIU/L-ACNC: 5.29 UIU/ML (ref 0.45–4.5)
VLDLC SERPL CALC-MCNC: 19 MG/DL (ref 5–40)
WBC # BLD AUTO: 5.6 X10E3/UL (ref 3.4–10.8)

## 2021-09-07 ENCOUNTER — OFFICE VISIT (OUTPATIENT)
Dept: FAMILY MEDICINE CLINIC | Facility: CLINIC | Age: 76
End: 2021-09-07

## 2021-09-07 VITALS
BODY MASS INDEX: 30.01 KG/M2 | DIASTOLIC BLOOD PRESSURE: 76 MMHG | TEMPERATURE: 96.6 F | HEIGHT: 68 IN | SYSTOLIC BLOOD PRESSURE: 124 MMHG | HEART RATE: 51 BPM | OXYGEN SATURATION: 95 % | RESPIRATION RATE: 18 BRPM | WEIGHT: 198 LBS

## 2021-09-07 DIAGNOSIS — M54.50 CHRONIC LOW BACK PAIN WITHOUT SCIATICA, UNSPECIFIED BACK PAIN LATERALITY: ICD-10-CM

## 2021-09-07 DIAGNOSIS — I10 ESSENTIAL HYPERTENSION: Primary | ICD-10-CM

## 2021-09-07 DIAGNOSIS — N18.30 STAGE 3 CHRONIC KIDNEY DISEASE, UNSPECIFIED WHETHER STAGE 3A OR 3B CKD (HCC): ICD-10-CM

## 2021-09-07 DIAGNOSIS — I48.0 PAROXYSMAL ATRIAL FIBRILLATION (HCC): ICD-10-CM

## 2021-09-07 DIAGNOSIS — E78.5 HYPERLIPIDEMIA, UNSPECIFIED HYPERLIPIDEMIA TYPE: ICD-10-CM

## 2021-09-07 DIAGNOSIS — M19.90 ARTHRITIS: ICD-10-CM

## 2021-09-07 DIAGNOSIS — G89.29 CHRONIC LOW BACK PAIN WITHOUT SCIATICA, UNSPECIFIED BACK PAIN LATERALITY: ICD-10-CM

## 2021-09-07 PROCEDURE — 99214 OFFICE O/P EST MOD 30 MIN: CPT | Performed by: INTERNAL MEDICINE

## 2021-09-07 NOTE — PROGRESS NOTES
Subjective   Denise Staley is a 76 y.o. female. Patient is here today for follow-up on her hypertension, hyperlipidemia, atrial fibrillation, chronic kidney disease stage III and low back pain.  She also has osteoarthritis in the knees.  She is getting gel shots in the knees and has an upcoming appointment with a spine specialist.  She also is seeing cardiology for her fibrillation.  Aside from this she has no acute complaints  Chief Complaint   Patient presents with   • Hypertension     lab follow up    • Hyperlipidemia          Vitals:    09/07/21 1308   BP: 124/76   Pulse: 51   Resp: 18   Temp: 96.6 °F (35.9 °C)   SpO2: 95%     Body mass index is 30.11 kg/m².  The following portions of the patient's history were reviewed and updated as appropriate: allergies, current medications, past family history, past medical history, past social history, past surgical history and problem list.    Past Medical History:   Diagnosis Date   • Arthritis    • Atrial fibrillation (CMS/HCC)    • Fatigue    • Health care maintenance    • Hyperlipidemia    • Hypertension    • Lower back pain    • Obesity    • TODD (obstructive sleep apnea) 08/04/2020    Home sleep study.  Weight 209 pounds.  At least mild TODD with AHI of 13.8 events per hour.  Low O2 saturation 69% and sleep-related hypoxia present for 234 minutes.  The patient snored 18% of the total monitoring time.   • Pelvic pain in female    • SOB (shortness of breath) on exertion    • TSH elevation    • Ventricular hypertrophy       No Known Allergies   Social History     Socioeconomic History   • Marital status:      Spouse name: Not on file   • Number of children: Not on file   • Years of education: Not on file   • Highest education level: Not on file   Tobacco Use   • Smoking status: Former Smoker   • Smokeless tobacco: Never Used   • Tobacco comment: no caffeine use   Vaping Use   • Vaping Use: Never used   Substance and Sexual Activity   • Alcohol use: Not  Currently   • Drug use: Defer   • Sexual activity: Defer        Current Outpatient Medications:   •  Acetaminophen (TYLENOL ARTHRITIS PAIN PO), Take 2 tablets by mouth As Needed., Disp: , Rfl:   •  amLODIPine (NORVASC) 10 MG tablet, TAKE 1 TABLET EVERY DAY, Disp: 90 tablet, Rfl: 3  •  atorvastatin (LIPITOR) 20 MG tablet, Take 1 tablet by mouth Daily., Disp: 90 tablet, Rfl: 3  •  benazepril (LOTENSIN) 40 MG tablet, TAKE 1 TABLET EVERY DAY, Disp: 90 tablet, Rfl: 3  •  Calcium Carbonate-Vitamin D (CALCIUM + D PO), Take 1 tablet by mouth Daily., Disp: , Rfl:   •  Cholecalciferol (VITAMIN D) 1000 UNITS tablet, Take 1,000 Units by mouth Daily., Disp: , Rfl:   •  diclofenac (VOLTAREN) 75 MG EC tablet, TAKE 1 TABLET EVERY DAY, Disp: 90 tablet, Rfl: 1  •  fluticasone (FLONASE) 50 MCG/ACT nasal spray, 2 sprays into each nostril Daily., Disp: , Rfl:   •  FOLIC ACID PO, Take  by mouth Daily., Disp: , Rfl:   •  hydroCHLOROthiazide (HYDRODIURIL) 25 MG tablet, Take 1 tablet by mouth Daily., Disp: 90 tablet, Rfl: 3  •  Loratadine 10 MG capsule, Take 1 tablet by mouth Daily., Disp: , Rfl:   •  multivitamin (MULTI-VITAMIN DAILY PO), Take  by mouth Daily., Disp: , Rfl:   •  rivaroxaban (Xarelto) 20 MG tablet, Take 1 tablet by mouth Daily., Disp: 90 tablet, Rfl: 2  •  amiodarone (PACERONE) 200 MG tablet, TAKE 1 TABLET EVERY DAY, Disp: 90 tablet, Rfl: 2     Objective     History of Present Illness     Review of Systems   Constitutional: Negative.    HENT: Negative.    Respiratory: Negative.    Cardiovascular: Negative.    Gastrointestinal: Negative.    Genitourinary: Negative.    Musculoskeletal: Positive for arthralgias and back pain.   Skin: Negative.    Neurological: Negative.    Hematological: Negative.    Psychiatric/Behavioral: Negative.        Physical Exam  Vitals and nursing note reviewed.   Constitutional:       General: She is not in acute distress.     Appearance: Normal appearance. She is not ill-appearing.   HENT:       Head: Normocephalic and atraumatic.   Eyes:      General: No scleral icterus.     Conjunctiva/sclera: Conjunctivae normal.   Cardiovascular:      Rate and Rhythm: Normal rate. Rhythm irregular.      Heart sounds: Normal heart sounds.   Pulmonary:      Effort: Pulmonary effort is normal. No respiratory distress.      Breath sounds: Normal breath sounds. No wheezing or rales.   Musculoskeletal:         General: Normal range of motion.      Cervical back: Normal range of motion and neck supple.   Skin:     General: Skin is warm and dry.   Neurological:      General: No focal deficit present.      Mental Status: She is alert and oriented to person, place, and time.   Psychiatric:         Mood and Affect: Mood normal.         Behavior: Behavior normal.         ASSESSMENT CBC is normal.  CMP has a creatinine of 1.61 that is pretty stable and otherwise was normal.  Lipid panel is controlled with total cholesterol 153, HDL 59 and LDL 75.  TSH was elevated at 5.29 but free T4 was minimally high at 1.8 and clinically the patient seems euthyroid  #1-hypertension controlled  #2-hyperlipidemia controlled  #3-chronic atrial fibrillation, rate controlled  #4-chronic kidney disease stage III, asymptomatic and stable  #5-chronic low back pain  #6-osteoarthritis of the knees     Problems Addressed this Visit        Cardiac and Vasculature    Atrial fibrillation (CMS/HCC)    Hyperlipidemia    Hypertension - Primary       Genitourinary and Reproductive     Stage 3 chronic kidney disease (CMS/[facility])       Musculoskeletal and Injuries    Low back pain      Diagnoses       Codes Comments    Essential hypertension    -  Primary ICD-10-CM: I10  ICD-9-CM: 401.9     Hyperlipidemia, unspecified hyperlipidemia type     ICD-10-CM: E78.5  ICD-9-CM: 272.4     Paroxysmal atrial fibrillation (CMS/HCC)     ICD-10-CM: I48.0  ICD-9-CM: 427.31     Stage 3 chronic kidney disease, unspecified whether stage 3a or 3b CKD (CMS/HCC)     ICD-10-CM:  N18.30  ICD-9-CM: 585.3     Chronic low back pain without sciatica, unspecified back pain laterality     ICD-10-CM: M54.5, G89.29  ICD-9-CM: 724.2, 338.29           PLAN patient is going to continue current medicines as now and I recommended the shingles immunizations and a flu shot in October and a Covid booster when appropriate.  I plan on rechecking the patient in 6 months for wellness visit as well as a CBC, CMP, lipid panel, TSH and free T4 and hepatitis C screen per protocol    There are no Patient Instructions on file for this visit.  Return in about 6 months (around 3/7/2022) for with labs.

## 2021-11-01 ENCOUNTER — OFFICE VISIT (OUTPATIENT)
Dept: CARDIOLOGY | Facility: CLINIC | Age: 76
End: 2021-11-01

## 2021-11-01 ENCOUNTER — HOSPITAL ENCOUNTER (OUTPATIENT)
Dept: CARDIOLOGY | Facility: HOSPITAL | Age: 76
Discharge: HOME | End: 2021-11-01
Admitting: INTERNAL MEDICINE

## 2021-11-01 VITALS
SYSTOLIC BLOOD PRESSURE: 130 MMHG | HEIGHT: 68 IN | BODY MASS INDEX: 28.43 KG/M2 | OXYGEN SATURATION: 98 % | HEART RATE: 79 BPM | WEIGHT: 187.6 LBS | DIASTOLIC BLOOD PRESSURE: 74 MMHG

## 2021-11-01 DIAGNOSIS — R55 NEAR SYNCOPE: ICD-10-CM

## 2021-11-01 DIAGNOSIS — R55 NEAR SYNCOPE: Primary | ICD-10-CM

## 2021-11-01 PROCEDURE — 93306 TTE W/DOPPLER COMPLETE: CPT | Performed by: INTERNAL MEDICINE

## 2021-11-01 PROCEDURE — 99214 OFFICE O/P EST MOD 30 MIN: CPT | Performed by: INTERNAL MEDICINE

## 2021-11-01 PROCEDURE — 93306 TTE W/DOPPLER COMPLETE: CPT

## 2021-11-01 RX ORDER — TRAMADOL HYDROCHLORIDE 50 MG/1
50 TABLET ORAL 2 TIMES DAILY PRN
COMMUNITY
Start: 2021-10-28 | End: 2022-08-08 | Stop reason: HOSPADM

## 2021-11-01 NOTE — PROGRESS NOTES
[facility]      Patient Name: Denise Staley  :1945  Age: 76 y.o.  Encounter Provider:  Angelito Velazquez Jr, MD      Chief Complaint: Follow-up persistent atrial fibrillation    Hypertension  Associated symptoms include malaise/fatigue. Pertinent negatives include no chest pain, palpitations or shortness of breath.   Atrial Fibrillation  Symptoms are negative for chest pain, dizziness, palpitations and shortness of breath. Past medical history includes atrial fibrillation.     Denise Staley is a 76 y.o. female past medical history of paroxysmal atrial fibrillation on amiodarone therapy long-term present for routine follow-up.      Summary of clinic visitation: Patient has been doing fairly well but feels functional capacity is decreased as she is unable to participate in her weekly aquatic therapy sessions.  She is limited in her ability to walk secondary to severe osteoarthritis and has not been getting her periodic steroid injections for bilateral knees.  With her activity around the home she denies any chest pain, shortness of air or palpitations.  No dizziness or syncope.  No orthopnea, PND or edema above baseline.  She does know when she is in atrial fibrillation states that she was out of rhythm on  for a few hours which resolved after she took an extra amiodarone dose.  She has not been able to take beta-blocker in the past due to her very low resting heart rate.  She has no bleeding complications associated with rivaroxaban therapy.  Social and family history reviewed and are not pertinent to this clinic visit. She is doing well since last visit.  Seen by her ophthalmologist with no evidence of amiodarone toxicity.  Chest x-ray was not performed since last visit.  TSH slightly elevated with normal T4.  LFTs within normal range.  She had 1 episode of atrial fibrillation per her report for approximately 5 days which ultimately resolved.  She is tolerating Xarelto  "well with no bleeding complications.  Social and family history reviewed are not pertinent to this clinic visit.    Patient has been in atrial fibrillation since May.  She saw Dr. Lopez who discussed all options with her.  Decision was made for ongoing rate control.  She notes continued shortness of air.  She has occasional swelling from the ankles to toes.  She feels this is improved but is noticed two episodes of dizziness over the past few months one of which was in the shower.  No loss of consciousness.  She still has palpitations on occasion.  She feels increased fatigue and general malaise but no other cardiac symptoms.    The following portions of the patient's history were reviewed and updated as appropriate: allergies, current medications, past family history, past medical history, past social history, past surgical history and problem list.      Review of Systems   Constitutional: Positive for malaise/fatigue. Negative for fever.   Cardiovascular: Negative for chest pain, dyspnea on exertion, leg swelling, near-syncope and palpitations.   Respiratory: Negative for cough and shortness of breath.    Musculoskeletal: Positive for arthritis and joint pain.   Neurological: Negative for dizziness, light-headedness, numbness and paresthesias.   All other systems reviewed and are negative.    ROS was reviewed, updated and amended when necessary.      OBJECTIVE:   Vital Signs  Vitals:    11/01/21 1135   BP: 130/74   Pulse: 79   SpO2: 98%     Estimated body mass index is 28.52 kg/m² as calculated from the following:    Height as of this encounter: 172.7 cm (68\").    Weight as of this encounter: 85.1 kg (187 lb 9.6 oz).    Physical Exam  Vitals reviewed.   Constitutional:       Appearance: She is well-developed.   HENT:      Head: Normocephalic and atraumatic.   Eyes:      Conjunctiva/sclera: Conjunctivae normal.      Pupils: Pupils are equal, round, and reactive to light.   Neck:      Thyroid: No thyromegaly.      " Vascular: No JVD.   Cardiovascular:      Rate and Rhythm: Rhythm irregular.      Heart sounds: No murmur heard.  No friction rub. No gallop.    Pulmonary:      Effort: No respiratory distress.   Chest:      Chest wall: No tenderness.   Abdominal:      General: Bowel sounds are normal. There is no distension.   Musculoskeletal:         General: No tenderness.   Skin:     Findings: No erythema or rash.   Neurological:      Mental Status: She is alert and oriented to person, place, and time.   Psychiatric:         Judgment: Judgment normal.         Procedures          ASSESSMENT:     PAF  Chronic amiodarone use  Chronic anticoagulation use    PLAN OF CARE:     1. PAF -amiodarone discontinued.  Given the episodic dizziness we will check a Holter monitor.  Not currently on rate control agents.  Continue Xarelto therapy.  2. Shortness of air -checking echocardiogram today.  3. Snoring and hypersomnia -diagnosed with mild sleep apnea but does not want to start CPAP therapy.    Return to clinic three months                 Discharge Medications          Accurate as of November 1, 2021 11:46 AM. If you have any questions, ask your nurse or doctor.            Continue These Medications      Instructions Start Date   amLODIPine 10 MG tablet  Commonly known as: NORVASC   TAKE 1 TABLET EVERY DAY      atorvastatin 20 MG tablet  Commonly known as: LIPITOR   20 mg, Oral, Daily      benazepril 40 MG tablet  Commonly known as: LOTENSIN   TAKE 1 TABLET EVERY DAY      CALCIUM + D PO   1 tablet, Oral, Daily      cholecalciferol 25 MCG (1000 UT) tablet  Commonly known as: VITAMIN D3   1,000 Units, Oral, Daily      diclofenac 75 MG EC tablet  Commonly known as: VOLTAREN   TAKE 1 TABLET EVERY DAY      fluticasone 50 MCG/ACT nasal spray  Commonly known as: FLONASE   2 sprays, Nasal, Daily      FOLIC ACID PO   Oral, Daily      hydroCHLOROthiazide 25 MG tablet  Commonly known as: HYDRODIURIL   25 mg, Oral, Daily      Loratadine 10 MG  capsule   1 tablet, Oral, Daily      multivitamin tablet tablet   1 tablet, Oral, Daily, Centrum Silver      rivaroxaban 20 MG tablet  Commonly known as: Xarelto   20 mg, Oral, Daily      traMADol 50 MG tablet  Commonly known as: ULTRAM   No dose, route, or frequency recorded.         Stop These Medications    amiodarone 200 MG tablet  Commonly known as: PACERONE  Stopped by: Angelito Velazquez Jr, MD     TYLENOL ARTHRITIS PAIN PO  Stopped by: Angelito Velazquez Jr, MD            Thank you for allowing me to participate in the care of your patient,      Sincerely,   Angelito Velazquez MD  [facility]  11/01/21  11:46 EDT

## 2021-11-02 LAB
AORTIC ARCH: 1.7 CM
ASCENDING AORTA: 3.2 CM
BH CV ECHO MEAS - ACS: 1.9 CM
BH CV ECHO MEAS - AO MAX PG (FULL): 2.5 MMHG
BH CV ECHO MEAS - AO MAX PG: 6.2 MMHG
BH CV ECHO MEAS - AO MEAN PG (FULL): 0 MMHG
BH CV ECHO MEAS - AO MEAN PG: 3 MMHG
BH CV ECHO MEAS - AO ROOT AREA (BSA CORRECTED): 1.7
BH CV ECHO MEAS - AO ROOT AREA: 9.1 CM^2
BH CV ECHO MEAS - AO ROOT DIAM: 3.4 CM
BH CV ECHO MEAS - AO V2 MAX: 124 CM/SEC
BH CV ECHO MEAS - AO V2 MEAN: 86.8 CM/SEC
BH CV ECHO MEAS - AO V2 VTI: 24.4 CM
BH CV ECHO MEAS - ASC AORTA: 3.2 CM
BH CV ECHO MEAS - AVA(I,A): 2.3 CM^2
BH CV ECHO MEAS - AVA(I,D): 2.3 CM^2
BH CV ECHO MEAS - AVA(V,A): 2.5 CM^2
BH CV ECHO MEAS - AVA(V,D): 2.5 CM^2
BH CV ECHO MEAS - BSA(HAYCOCK): 2 M^2
BH CV ECHO MEAS - BSA: 2 M^2
BH CV ECHO MEAS - BZI_BMI: 29.4 KILOGRAMS/M^2
BH CV ECHO MEAS - BZI_METRIC_HEIGHT: 170.2 CM
BH CV ECHO MEAS - BZI_METRIC_WEIGHT: 85.3 KG
BH CV ECHO MEAS - EDV(MOD-SP2): 55 ML
BH CV ECHO MEAS - EDV(MOD-SP4): 66 ML
BH CV ECHO MEAS - EDV(TEICH): 30.1 ML
BH CV ECHO MEAS - EF(CUBED): 69 %
BH CV ECHO MEAS - EF(MOD-BP): 67.6 %
BH CV ECHO MEAS - EF(MOD-SP2): 67.3 %
BH CV ECHO MEAS - EF(MOD-SP4): 66.7 %
BH CV ECHO MEAS - EF(TEICH): 62.4 %
BH CV ECHO MEAS - ESV(MOD-SP2): 18 ML
BH CV ECHO MEAS - ESV(MOD-SP4): 22 ML
BH CV ECHO MEAS - ESV(TEICH): 11.3 ML
BH CV ECHO MEAS - FS: 32.3 %
BH CV ECHO MEAS - IVS/LVPW: 1
BH CV ECHO MEAS - IVSD: 1.3 CM
BH CV ECHO MEAS - LAT PEAK E' VEL: 9 CM/SEC
BH CV ECHO MEAS - LV DIASTOLIC VOL/BSA (35-75): 33.5 ML/M^2
BH CV ECHO MEAS - LV MASS(C)D: 119.7 GRAMS
BH CV ECHO MEAS - LV MASS(C)DI: 60.8 GRAMS/M^2
BH CV ECHO MEAS - LV MAX PG: 3.6 MMHG
BH CV ECHO MEAS - LV MEAN PG: 3 MMHG
BH CV ECHO MEAS - LV SYSTOLIC VOL/BSA (12-30): 11.2 ML/M^2
BH CV ECHO MEAS - LV V1 MAX: 95.3 CM/SEC
BH CV ECHO MEAS - LV V1 MEAN: 77.2 CM/SEC
BH CV ECHO MEAS - LV V1 VTI: 17.4 CM
BH CV ECHO MEAS - LVIDD: 2.8 CM
BH CV ECHO MEAS - LVIDS: 1.9 CM
BH CV ECHO MEAS - LVLD AP2: 6.7 CM
BH CV ECHO MEAS - LVLD AP4: 6.7 CM
BH CV ECHO MEAS - LVLS AP2: 5.9 CM
BH CV ECHO MEAS - LVLS AP4: 5.8 CM
BH CV ECHO MEAS - LVOT AREA (M): 3.1 CM^2
BH CV ECHO MEAS - LVOT AREA: 3.2 CM^2
BH CV ECHO MEAS - LVOT DIAM: 2 CM
BH CV ECHO MEAS - LVPWD: 1.3 CM
BH CV ECHO MEAS - MED PEAK E' VEL: 10.9 CM/SEC
BH CV ECHO MEAS - MR MAX PG: 17.3 MMHG
BH CV ECHO MEAS - MR MAX VEL: 208 CM/SEC
BH CV ECHO MEAS - MV DEC SLOPE: 467 CM/SEC^2
BH CV ECHO MEAS - MV DEC TIME: 0.16 SEC
BH CV ECHO MEAS - MV E MAX VEL: 128 CM/SEC
BH CV ECHO MEAS - MV MAX PG: 6 MMHG
BH CV ECHO MEAS - MV MEAN PG: 2 MMHG
BH CV ECHO MEAS - MV P1/2T MAX VEL: 119 CM/SEC
BH CV ECHO MEAS - MV P1/2T: 74.6 MSEC
BH CV ECHO MEAS - MV V2 MAX: 122 CM/SEC
BH CV ECHO MEAS - MV V2 MEAN: 65.4 CM/SEC
BH CV ECHO MEAS - MV V2 VTI: 21.9 CM
BH CV ECHO MEAS - MVA P1/2T LCG: 1.8 CM^2
BH CV ECHO MEAS - MVA(P1/2T): 2.9 CM^2
BH CV ECHO MEAS - MVA(VTI): 2.5 CM^2
BH CV ECHO MEAS - PA ACC TIME: 0.12 SEC
BH CV ECHO MEAS - PA MAX PG (FULL): 0.14 MMHG
BH CV ECHO MEAS - PA MAX PG: 2.4 MMHG
BH CV ECHO MEAS - PA PR(ACCEL): 26.7 MMHG
BH CV ECHO MEAS - PA V2 MAX: 77.3 CM/SEC
BH CV ECHO MEAS - PULM DIAS VEL: 51.8 CM/SEC
BH CV ECHO MEAS - PULM S/D: 0.69
BH CV ECHO MEAS - PULM SYS VEL: 35.6 CM/SEC
BH CV ECHO MEAS - PVA(V,A): 2.7 CM^2
BH CV ECHO MEAS - PVA(V,D): 2.7 CM^2
BH CV ECHO MEAS - QP/QS: 0.56
BH CV ECHO MEAS - RAP SYSTOLE: 3 MMHG
BH CV ECHO MEAS - RV MAX PG: 2.2 MMHG
BH CV ECHO MEAS - RV MEAN PG: 1 MMHG
BH CV ECHO MEAS - RV V1 MAX: 75 CM/SEC
BH CV ECHO MEAS - RV V1 MEAN: 47.5 CM/SEC
BH CV ECHO MEAS - RV V1 VTI: 11.2 CM
BH CV ECHO MEAS - RVOT AREA: 2.8 CM^2
BH CV ECHO MEAS - RVOT DIAM: 1.9 CM
BH CV ECHO MEAS - RVSP: 8 MMHG
BH CV ECHO MEAS - SI(AO): 113.2 ML/M^2
BH CV ECHO MEAS - SI(CUBED): 7.9 ML/M^2
BH CV ECHO MEAS - SI(LVOT): 28.2 ML/M^2
BH CV ECHO MEAS - SI(MOD-SP2): 18.8 ML/M^2
BH CV ECHO MEAS - SI(MOD-SP4): 22.3 ML/M^2
BH CV ECHO MEAS - SI(TEICH): 9.5 ML/M^2
BH CV ECHO MEAS - SUP REN AO DIAM: 1.9 CM
BH CV ECHO MEAS - SV(AO): 222.9 ML
BH CV ECHO MEAS - SV(CUBED): 15.5 ML
BH CV ECHO MEAS - SV(LVOT): 55.5 ML
BH CV ECHO MEAS - SV(MOD-SP2): 37 ML
BH CV ECHO MEAS - SV(MOD-SP4): 44 ML
BH CV ECHO MEAS - SV(RVOT): 31.3 ML
BH CV ECHO MEAS - SV(TEICH): 18.8 ML
BH CV ECHO MEAS - TAPSE (>1.6): 1.8 CM
BH CV ECHO MEAS - TR MAX VEL: 108 CM/SEC
BH CV ECHO MEASUREMENTS AVERAGE E/E' RATIO: 12.86
BH CV XLRA - RV BASE: 3.4 CM
BH CV XLRA - RV LENGTH: 6.1 CM
BH CV XLRA - RV MID: 3.4 CM
BH CV XLRA - TDI S': 11.4 CM/SEC
LEFT ATRIUM VOLUME INDEX: 38 ML/M2
MAXIMAL PREDICTED HEART RATE: 143 BPM
SINUS: 3.4 CM
STJ: 3 CM
STRESS TARGET HR: 122 BPM

## 2021-12-14 RX ORDER — HYDROCHLOROTHIAZIDE 25 MG/1
TABLET ORAL
Qty: 90 TABLET | Refills: 1 | Status: SHIPPED | OUTPATIENT
Start: 2021-12-14 | End: 2022-07-28

## 2022-01-04 RX ORDER — RIVAROXABAN 20 MG/1
TABLET, FILM COATED ORAL
Qty: 90 TABLET | Refills: 2 | Status: SHIPPED | OUTPATIENT
Start: 2022-01-04 | End: 2022-05-18

## 2022-02-18 RX ORDER — DICLOFENAC SODIUM 75 MG/1
TABLET, DELAYED RELEASE ORAL
Qty: 90 TABLET | Refills: 1 | Status: SHIPPED | OUTPATIENT
Start: 2022-02-18 | End: 2022-07-28

## 2022-03-09 LAB
ALBUMIN SERPL-MCNC: 4 G/DL (ref 3.7–4.7)
ALBUMIN/GLOB SERPL: 1.9 {RATIO} (ref 1.2–2.2)
ALP SERPL-CCNC: 78 IU/L (ref 44–121)
ALT SERPL-CCNC: 16 IU/L (ref 0–32)
AMBIG ABBREV CMP14 DEFAULT: NORMAL
AMBIG ABBREV LP DEFAULT: NORMAL
AST SERPL-CCNC: 22 IU/L (ref 0–40)
BASOPHILS # BLD AUTO: 0 X10E3/UL (ref 0–0.2)
BASOPHILS NFR BLD AUTO: 1 %
BILIRUB SERPL-MCNC: 0.6 MG/DL (ref 0–1.2)
BUN SERPL-MCNC: 21 MG/DL (ref 8–27)
BUN/CREAT SERPL: 15 (ref 12–28)
CALCIUM SERPL-MCNC: 9.8 MG/DL (ref 8.7–10.3)
CHLORIDE SERPL-SCNC: 105 MMOL/L (ref 96–106)
CHOLEST SERPL-MCNC: 174 MG/DL (ref 100–199)
CO2 SERPL-SCNC: 24 MMOL/L (ref 20–29)
CREAT SERPL-MCNC: 1.38 MG/DL (ref 0.57–1)
EGFR GENE MUT ANL BLD/T: 39 ML/MIN/1.73
EOSINOPHIL # BLD AUTO: 0.1 X10E3/UL (ref 0–0.4)
EOSINOPHIL NFR BLD AUTO: 2 %
ERYTHROCYTE [DISTWIDTH] IN BLOOD BY AUTOMATED COUNT: 11.2 % (ref 11.7–15.4)
GLOBULIN SER CALC-MCNC: 2.1 G/DL (ref 1.5–4.5)
GLUCOSE SERPL-MCNC: 76 MG/DL (ref 65–99)
HCT VFR BLD AUTO: 39.7 % (ref 34–46.6)
HDLC SERPL-MCNC: 65 MG/DL
HGB BLD-MCNC: 13.3 G/DL (ref 11.1–15.9)
IMM GRANULOCYTES # BLD AUTO: 0 X10E3/UL (ref 0–0.1)
IMM GRANULOCYTES NFR BLD AUTO: 0 %
LDLC SERPL CALC-MCNC: 87 MG/DL (ref 0–99)
LYMPHOCYTES # BLD AUTO: 1.5 X10E3/UL (ref 0.7–3.1)
LYMPHOCYTES NFR BLD AUTO: 19 %
MCH RBC QN AUTO: 32.4 PG (ref 26.6–33)
MCHC RBC AUTO-ENTMCNC: 33.5 G/DL (ref 31.5–35.7)
MCV RBC AUTO: 97 FL (ref 79–97)
MONOCYTES # BLD AUTO: 0.6 X10E3/UL (ref 0.1–0.9)
MONOCYTES NFR BLD AUTO: 8 %
NEUTROPHILS # BLD AUTO: 5.4 X10E3/UL (ref 1.4–7)
NEUTROPHILS NFR BLD AUTO: 70 %
PLATELET # BLD AUTO: 317 X10E3/UL (ref 150–450)
POTASSIUM SERPL-SCNC: 4.4 MMOL/L (ref 3.5–5.2)
PROT SERPL-MCNC: 6.1 G/DL (ref 6–8.5)
RBC # BLD AUTO: 4.11 X10E6/UL (ref 3.77–5.28)
SODIUM SERPL-SCNC: 145 MMOL/L (ref 134–144)
T4 FREE SERPL-MCNC: 1.46 NG/DL (ref 0.82–1.77)
TRIGL SERPL-MCNC: 123 MG/DL (ref 0–149)
TSH SERPL DL<=0.005 MIU/L-ACNC: 6.73 UIU/ML (ref 0.45–4.5)
VLDLC SERPL CALC-MCNC: 22 MG/DL (ref 5–40)
WBC # BLD AUTO: 7.6 X10E3/UL (ref 3.4–10.8)

## 2022-03-16 ENCOUNTER — OFFICE VISIT (OUTPATIENT)
Dept: FAMILY MEDICINE CLINIC | Facility: CLINIC | Age: 77
End: 2022-03-16

## 2022-03-16 VITALS
BODY MASS INDEX: 28.82 KG/M2 | TEMPERATURE: 97.7 F | WEIGHT: 190.2 LBS | SYSTOLIC BLOOD PRESSURE: 120 MMHG | DIASTOLIC BLOOD PRESSURE: 78 MMHG | HEIGHT: 68 IN | HEART RATE: 86 BPM | RESPIRATION RATE: 18 BRPM | OXYGEN SATURATION: 96 %

## 2022-03-16 DIAGNOSIS — R79.89 TSH ELEVATION: ICD-10-CM

## 2022-03-16 DIAGNOSIS — Z12.31 SCREENING MAMMOGRAM, ENCOUNTER FOR: ICD-10-CM

## 2022-03-16 DIAGNOSIS — M54.50 CHRONIC LOW BACK PAIN WITHOUT SCIATICA, UNSPECIFIED BACK PAIN LATERALITY: ICD-10-CM

## 2022-03-16 DIAGNOSIS — N18.30 STAGE 3 CHRONIC KIDNEY DISEASE, UNSPECIFIED WHETHER STAGE 3A OR 3B CKD: ICD-10-CM

## 2022-03-16 DIAGNOSIS — E03.8 OTHER SPECIFIED HYPOTHYROIDISM: ICD-10-CM

## 2022-03-16 DIAGNOSIS — G89.29 CHRONIC LOW BACK PAIN WITHOUT SCIATICA, UNSPECIFIED BACK PAIN LATERALITY: ICD-10-CM

## 2022-03-16 DIAGNOSIS — R53.83 FATIGUE, UNSPECIFIED TYPE: ICD-10-CM

## 2022-03-16 DIAGNOSIS — I48.0 PAROXYSMAL ATRIAL FIBRILLATION: Primary | ICD-10-CM

## 2022-03-16 DIAGNOSIS — E78.5 HYPERLIPIDEMIA, UNSPECIFIED HYPERLIPIDEMIA TYPE: ICD-10-CM

## 2022-03-16 DIAGNOSIS — Z78.0 POST-MENOPAUSAL: ICD-10-CM

## 2022-03-16 DIAGNOSIS — I10 PRIMARY HYPERTENSION: ICD-10-CM

## 2022-03-16 PROCEDURE — G0438 PPPS, INITIAL VISIT: HCPCS | Performed by: INTERNAL MEDICINE

## 2022-03-16 PROCEDURE — 99214 OFFICE O/P EST MOD 30 MIN: CPT | Performed by: INTERNAL MEDICINE

## 2022-03-16 PROCEDURE — 1170F FXNL STATUS ASSESSED: CPT | Performed by: INTERNAL MEDICINE

## 2022-03-16 PROCEDURE — 1159F MED LIST DOCD IN RCRD: CPT | Performed by: INTERNAL MEDICINE

## 2022-03-16 PROCEDURE — 96160 PT-FOCUSED HLTH RISK ASSMT: CPT | Performed by: INTERNAL MEDICINE

## 2022-03-16 NOTE — PROGRESS NOTES
The ABCs of the Annual Wellness Visit  Initial Medicare Wellness Visit    Chief Complaint   Patient presents with   • Medicare Wellness-Initial Visit     PT HERE FOR AWV AND F/U LABS     Subjective   History of Present Illness:  Denise Staley is a 76 y.o. female who presents for an Initial Medicare Wellness Visit.  She is also here for follow-up on her atrial fibrillation, hyperlipidemia, hypertension, and chronic kidney disease stage III, low back pain and TSH elevation.  She is generally been stable and has no new complaints    The following portions of the patient's history were reviewed and   updated as appropriate: allergies, current medications, past family history, past medical history, past social history, past surgical history and problem list.     Compared to one year ago, the patient feels her physical   health is worse.    Compared to one year ago, the patient feels her mental   health is the same.    Recent Hospitalizations:  She was not admitted to the hospital during the last year.       Current Medical Providers:  Patient Care Team:  Mykel De Los Santos MD as PCP - General    Outpatient Medications Prior to Visit   Medication Sig Dispense Refill   • amLODIPine (NORVASC) 10 MG tablet TAKE 1 TABLET EVERY DAY 90 tablet 3   • atorvastatin (LIPITOR) 20 MG tablet Take 1 tablet by mouth Daily. 90 tablet 3   • benazepril (LOTENSIN) 40 MG tablet TAKE 1 TABLET EVERY DAY 90 tablet 3   • Calcium Carbonate-Vitamin D (CALCIUM + D PO) Take 1 tablet by mouth Daily.     • Cholecalciferol (VITAMIN D) 1000 UNITS tablet Take 1,000 Units by mouth Daily.     • diclofenac (VOLTAREN) 75 MG EC tablet TAKE 1 TABLET EVERY DAY 90 tablet 1   • fluticasone (FLONASE) 50 MCG/ACT nasal spray 2 sprays into each nostril Daily.     • FOLIC ACID PO Take  by mouth Daily.     • hydroCHLOROthiazide (HYDRODIURIL) 25 MG tablet TAKE 1 TABLET EVERY DAY 90 tablet 1   • Loratadine 10 MG capsule Take 1 tablet by mouth Daily.     •  "multivitamin (THERAGRAN) tablet tablet Take 1 tablet by mouth Daily. Centrum Silver     • traMADol (ULTRAM) 50 MG tablet      • Xarelto 20 MG tablet TAKE 1 TABLET EVERY DAY 90 tablet 2     No facility-administered medications prior to visit.       Opioid medication/s are on active medication list.  and I have evaluated her active treatment plan and pain score trends (see table).  There were no vitals filed for this visit.  I have reviewed the chart for potential of high risk medication and harmful drug interactions in the elderly.            Aspirin is not on active medication list.  Aspirin use is not indicated based on review of current medical condition/s. Risk of harm outweighs potential benefits.  .    Patient Active Problem List   Diagnosis   • Arthritis   • Atrial fibrillation (HCC)   • Hyperlipidemia   • Hypertension   • Low back pain   • SOB (shortness of breath) on exertion   • Dependent edema   • Stage 3 chronic kidney disease (HCC)   • TSH elevation   • Headache in back of head   • TODD (obstructive sleep apnea)   • Sleep-related hypoxia   • Hypersomnia due to medical condition   • Obesity     Advance Care Planning  Advance Directive is not on file.  ACP discussion was held with the patient during this visit. Patient does not have an advance directive, information provided.    Review of Systems   All other systems reviewed and are negative.       Objective       Vitals:    03/16/22 1510   BP: 120/78   Pulse: 86   Resp: 18   Temp: 97.7 °F (36.5 °C)   TempSrc: Oral   SpO2: 96%   Weight: 86.3 kg (190 lb 3.2 oz)   Height: 172.7 cm (67.99\")     BMI Readings from Last 1 Encounters:   03/16/22 28.93 kg/m²   BMI is above normal parameters. Recommendations include: exercise counseling    Does the patient have evidence of cognitive impairment? No    Physical Exam  Vitals and nursing note reviewed.   Constitutional:       General: She is not in acute distress.     Appearance: Normal appearance. She is not " ill-appearing.   HENT:      Head: Normocephalic and atraumatic.   Cardiovascular:      Rate and Rhythm: Normal rate and regular rhythm.      Heart sounds: Normal heart sounds.   Pulmonary:      Effort: Pulmonary effort is normal. No respiratory distress.      Breath sounds: Normal breath sounds. No wheezing or rales.   Musculoskeletal:         General: Normal range of motion.   Skin:     General: Skin is warm and dry.   Neurological:      General: No focal deficit present.      Mental Status: She is alert and oriented to person, place, and time.   Psychiatric:         Mood and Affect: Mood normal.         Behavior: Behavior normal.       Lab Results   Component Value Date    CHLPL 174 03/08/2022    TRIG 123 03/08/2022    HDL 65 03/08/2022    LDL 87 03/08/2022    VLDL 22 03/08/2022          HEALTH RISK ASSESSMENT    Smoking Status:  Social History     Tobacco Use   Smoking Status Former Smoker   Smokeless Tobacco Never Used   Tobacco Comment    no caffeine use     Alcohol Consumption:  Social History     Substance and Sexual Activity   Alcohol Use Not Currently     Fall Risk Screen:    [facility] Fall Risk Assessment was completed, and patient is at HIGH risk for falls. Assessment completed on:3/16/2022    Depression Screen:   PHQ-2/PHQ-9 Depression Screening 3/16/2022   Retired PHQ-9 Total Score -   Retired Total Score -   Little Interest or Pleasure in Doing Things 0-->not at all   Feeling Down, Depressed or Hopeless 0-->not at all   PHQ-9: Brief Depression Severity Measure Score 0       Health Habits and Functional and Cognitive Screening:  Functional & Cognitive Status 3/16/2022   Do you have difficulty preparing food and eating? No   Do you have difficulty bathing yourself, getting dressed or grooming yourself? No   Do you have difficulty using the toilet? No   Do you have difficulty moving around from place to place? No   Do you have trouble with steps or getting out of a bed or a chair? No   Current Diet Well  Balanced Diet   Dental Exam Up to date   Eye Exam Up to date   Exercise (times per week) 0 times per week   Current Exercises Include No Regular Exercise   Do you need help using the phone?  No   Are you deaf or do you have serious difficulty hearing?  No   Do you need help with transportation? Yes   Do you need help shopping? No   Do you need help preparing meals?  No   Do you need help with housework?  Yes   Do you need help with laundry? No   Do you need help taking your medications? No   Do you need help managing money? No   Do you ever drive or ride in a car without wearing a seat belt? No   Have you felt unusual stress, anger or loneliness in the last month? No   Who do you live with? Alone   If you need help, do you have trouble finding someone available to you? No   Have you been bothered in the last four weeks by sexual problems? No   Do you have difficulty concentrating, remembering or making decisions? No       Age-appropriate Screening Schedule:  Refer to the list below for future screening recommendations based on patient's age, sex and/or medical conditions. Orders for these recommended tests are listed in the plan section. The patient has been provided with a written plan.    Health Maintenance   Topic Date Due   • DXA SCAN  08/10/2007   • MAMMOGRAM  04/24/2021   • LIPID PANEL  03/08/2023   • TDAP/TD VACCINES (2 - Td or Tdap) 03/12/2029   • INFLUENZA VACCINE  Completed   • ZOSTER VACCINE  Completed            Assessment/Plan CBC was normal.  CMP has a creatinine of 1.38 that is improved, sodium 145 and other values normal.  Lipid panel is stable with total cholesterol 174, HDL 65, LDL 87.  TSH is somewhat elevated at 6.73 and free T4 remains normal at 1.46 and clinically the patient's euthyroid  #1-hypertension controlled  #2-hyperlipidemia controlled  #3-elevated TSH, clinically euthyroid  #4-atrial fibrillation, rate controlled  #5-chronic kidney disease stage III, asymptomatic and  stable  #6-arthritis especially the knees and low back pain    CMS Preventative Services Quick Reference  Risk Factors Identified During Encounter  MAMMOGRAM, BONE DENSITY  The above risks/problems have been discussed with the patient.  Follow up actions/plans if indicated are seen below in the Assessment/Plan Section.  Pertinent information has been shared with the patient in the After Visit Summary.    There are no diagnoses linked to this encounter.    Follow Up: I have ordered a bone density and mammograms as the patient is due for those.  She will continue current medicines as now and I plan on rechecking her in 6 months with laboratory studies  No follow-ups on file.     An After Visit Summary and PPPS were made available to the patient.

## 2022-03-17 ENCOUNTER — TRANSCRIBE ORDERS (OUTPATIENT)
Dept: FAMILY MEDICINE CLINIC | Facility: CLINIC | Age: 77
End: 2022-03-17

## 2022-03-17 DIAGNOSIS — Z12.31 VISIT FOR SCREENING MAMMOGRAM: Primary | ICD-10-CM

## 2022-04-22 RX ORDER — AMLODIPINE BESYLATE 10 MG/1
TABLET ORAL
Qty: 90 TABLET | Refills: 3 | Status: SHIPPED | OUTPATIENT
Start: 2022-04-22

## 2022-05-18 ENCOUNTER — OFFICE VISIT (OUTPATIENT)
Dept: CARDIOLOGY | Facility: CLINIC | Age: 77
End: 2022-05-18

## 2022-05-18 VITALS
BODY MASS INDEX: 29.55 KG/M2 | SYSTOLIC BLOOD PRESSURE: 122 MMHG | OXYGEN SATURATION: 98 % | DIASTOLIC BLOOD PRESSURE: 72 MMHG | WEIGHT: 195 LBS | HEART RATE: 88 BPM | HEIGHT: 68 IN

## 2022-05-18 DIAGNOSIS — I48.11 LONGSTANDING PERSISTENT ATRIAL FIBRILLATION: Primary | ICD-10-CM

## 2022-05-18 DIAGNOSIS — I10 ESSENTIAL HYPERTENSION: ICD-10-CM

## 2022-05-18 PROCEDURE — 99214 OFFICE O/P EST MOD 30 MIN: CPT | Performed by: NURSE PRACTITIONER

## 2022-05-18 PROCEDURE — 93000 ELECTROCARDIOGRAM COMPLETE: CPT | Performed by: NURSE PRACTITIONER

## 2022-05-18 NOTE — PROGRESS NOTES
Date of Office Visit: 22  Encounter Provider: ELIEL Perez  Place of Service: [facility]  Patient Name: Denise Staley  :1945    Chief Complaint   Patient presents with   • Atrial Fibrillation   • Hypertension   • Shortness of Breath   • Sleep Apnea   • hld   :     HPI: Denise Staley is a 76 y.o. female  with paroxysmal atrial fibrillation, long-term amiodarone use, obstructive sleep apnea, and long-term anticoagulation use.  She was previously followed by Dr. Anshul Worthy.  She is now followed by Dr. Velazquez       In 2018 she had echocardiogram which showed normal left ventricular systolic function, mild thickening of the aortic valve and normal RVSP.  She did not had successful cardioversion of atrial fibrillation in 2018.  She had near syncope with repeat echo 2020  .  She had normal left ventricular systolic function and no significant valvular disease despite some mild valvular calcification..  48-hour Holter showed continuous atrial fibrillation with heart rate ranging  with an average of 80 bpm.    She presents today for reassessment.  Her main complaint is arthritis and back pain.  She has a left foot cystic area.  There is discussion to open and drain that I believe.  She denies chest pain tightness or pressure.  She has back pain and sometimes needs to go lay down if she is making a large meal like lasagna.  She has been wearing compression stockings to help manage lower extremity edema.  She reports daytime sleepiness.      No Known Allergies        Family and social history reviewed.     Review of Systems   Musculoskeletal: Positive for joint pain.   Neurological: Positive for excessive daytime sleepiness.     All other systems were reviewed and are negative          Objective:     Vitals:    22 0902   BP: 122/72   BP Location: Left arm   Patient Position: Sitting   Cuff Size: Large Adult   Pulse: 88  "  SpO2: 98%   Weight: 88.5 kg (195 lb)   Height: 172.7 cm (68\")     Body mass index is 29.65 kg/m².    PHYSICAL EXAM:  Cardiovascular:      Normal rate. Irregularly irregular rhythm.           ECG 12 Lead    Date/Time: 5/18/2022 9:30 AM  Performed by: Muriel Saeed APRN  Authorized by: Muriel Saeed APRN   Comparison: compared with previous ECG   Similar to previous ECG  Rhythm: atrial fibrillation  Rate: normal  QRS axis: normal                Current Outpatient Medications   Medication Sig Dispense Refill   • amLODIPine (NORVASC) 10 MG tablet TAKE 1 TABLET EVERY DAY 90 tablet 3   • atorvastatin (LIPITOR) 20 MG tablet Take 1 tablet by mouth Daily. 90 tablet 3   • benazepril (LOTENSIN) 40 MG tablet TAKE 1 TABLET EVERY DAY 90 tablet 3   • Calcium Carbonate-Vitamin D (CALCIUM + D PO) Take 1 tablet by mouth Daily.     • Cholecalciferol (VITAMIN D) 1000 UNITS tablet Take 1,000 Units by mouth Daily.     • diclofenac (VOLTAREN) 75 MG EC tablet TAKE 1 TABLET EVERY DAY 90 tablet 1   • fluticasone (FLONASE) 50 MCG/ACT nasal spray 2 sprays into each nostril Daily.     • FOLIC ACID PO Take  by mouth Daily.     • hydroCHLOROthiazide (HYDRODIURIL) 25 MG tablet TAKE 1 TABLET EVERY DAY 90 tablet 1   • Loratadine 10 MG capsule Take 1 tablet by mouth Daily.     • multivitamin (THERAGRAN) tablet tablet Take 1 tablet by mouth Daily. Centrum Silver     • traMADol (ULTRAM) 50 MG tablet      • rivaroxaban (Xarelto) 15 MG tablet Take 1 tablet by mouth Daily. 90 tablet 2     No current facility-administered medications for this visit.     Assessment:      No diagnosis found.     Orders Placed This Encounter   Procedures   • ECG 12 Lead     This order was created via procedure documentation     Order Specific Question:   Release to patient     Answer:   Immediate         Plan:        1. 76-year-old female with persistent atrial fibrillation.  no longer on amiodarone due to failure.  Maintained on Xarelto 20 mg.  She is rate controlled.  I " calculated creatinine clearance and providing creatinine remains 1.32 or higher she should be on lower dose Xarelto 15 mg daily so I will initiate a change.  I gave her 4 weeks of samples and sent new prescription to her mail service.  She verbalized understanding of this change  2.  History of abnormal TSH-she is not on thyroid replacement followed by PCP  3.  Lower extremity edema improved while wearing compression stockings  4. HTN blood pressure appears well controlled  5.  Arthritis and back pain-she reports having bone spurs and chronic back pain.  I do not feel this is cardiac in nature.  Discussed possible  Walking perfusion stress test for evaluation but she would like to hold off and call if that worsens  6.  Obstructive sleep apnea-this was mild   7.  Left foot issue-apparently needing localized intervention.  She is cleared to proceed and may hold Xarelto 2 to 3 days prior and resume as soon as possible        Follow-up in 6 months          It has been a pleasure to participate in this patient's care.      Thank you,  ELIEL Perez      **I used Dragon to dictate this note:**

## 2022-05-25 RX ORDER — ATORVASTATIN CALCIUM 20 MG/1
TABLET, FILM COATED ORAL
Qty: 90 TABLET | Refills: 3 | Status: SHIPPED | OUTPATIENT
Start: 2022-05-25

## 2022-07-13 ENCOUNTER — HOSPITAL ENCOUNTER (OUTPATIENT)
Dept: MAMMOGRAPHY | Facility: HOSPITAL | Age: 77
Discharge: HOME | End: 2022-07-13

## 2022-07-13 ENCOUNTER — HOSPITAL ENCOUNTER (OUTPATIENT)
Dept: BONE DENSITY | Facility: HOSPITAL | Age: 77
Discharge: HOME | End: 2022-07-13

## 2022-07-13 DIAGNOSIS — Z78.0 POST-MENOPAUSAL: ICD-10-CM

## 2022-07-13 DIAGNOSIS — Z12.31 VISIT FOR SCREENING MAMMOGRAM: ICD-10-CM

## 2022-07-13 PROCEDURE — 77080 DXA BONE DENSITY AXIAL: CPT

## 2022-07-13 PROCEDURE — 77067 SCR MAMMO BI INCL CAD: CPT

## 2022-07-13 PROCEDURE — 77063 BREAST TOMOSYNTHESIS BI: CPT

## 2022-07-28 RX ORDER — DICLOFENAC SODIUM 75 MG/1
TABLET, DELAYED RELEASE ORAL
Qty: 90 TABLET | Refills: 1 | Status: SHIPPED | OUTPATIENT
Start: 2022-07-28

## 2022-07-28 RX ORDER — BENAZEPRIL HYDROCHLORIDE 40 MG/1
TABLET, FILM COATED ORAL
Qty: 90 TABLET | Refills: 3 | Status: SHIPPED | OUTPATIENT
Start: 2022-07-28

## 2022-07-28 RX ORDER — HYDROCHLOROTHIAZIDE 25 MG/1
TABLET ORAL
Qty: 90 TABLET | Refills: 1 | Status: SHIPPED | OUTPATIENT
Start: 2022-07-28 | End: 2023-03-28

## 2022-07-28 NOTE — TELEPHONE ENCOUNTER
Rx Refill Note  Requested Prescriptions     Pending Prescriptions Disp Refills   • hydroCHLOROthiazide (HYDRODIURIL) 25 MG tablet [Pharmacy Med Name: HYDROCHLOROTHIAZIDE 25 MG Tablet] 90 tablet 1     Sig: TAKE 1 TABLET EVERY DAY        Last office visit: 5/18/2022 (last saw [facility] on 11/01/2021)  Next office visit: 11/17/2022    Last relevant lab date: 3/8/2022    Thank you,  Nakia Stanley RN  07/28/22, 11:34 EDT

## 2022-08-05 ENCOUNTER — PRE-ADMISSION TESTING (OUTPATIENT)
Dept: PREADMISSION TESTING | Facility: HOSPITAL | Age: 77
End: 2022-08-05

## 2022-08-05 LAB
ALBUMIN SERPL-MCNC: 3.9 G/DL (ref 3.5–5.2)
ALBUMIN/GLOB SERPL: 1.6 G/DL
ALP SERPL-CCNC: 81 U/L (ref 39–117)
ALT SERPL W P-5'-P-CCNC: 13 U/L (ref 1–33)
ANION GAP SERPL CALCULATED.3IONS-SCNC: 13 MMOL/L (ref 5–15)
AST SERPL-CCNC: 21 U/L (ref 1–32)
BASOPHILS # BLD AUTO: 0.02 10*3/MM3 (ref 0–0.2)
BASOPHILS NFR BLD AUTO: 0.3 % (ref 0–1.5)
BILIRUB SERPL-MCNC: 0.7 MG/DL (ref 0–1.2)
BUN SERPL-MCNC: 22 MG/DL (ref 8–23)
BUN/CREAT SERPL: 16.2 (ref 7–25)
CALCIUM SPEC-SCNC: 10 MG/DL (ref 8.6–10.5)
CHLORIDE SERPL-SCNC: 106 MMOL/L (ref 98–107)
CO2 SERPL-SCNC: 22 MMOL/L (ref 22–29)
CREAT SERPL-MCNC: 1.36 MG/DL (ref 0.57–1)
DEPRECATED RDW RBC AUTO: 41.3 FL (ref 37–54)
EGFRCR SERPLBLD CKD-EPI 2021: 40 ML/MIN/1.73
EOSINOPHIL # BLD AUTO: 0.04 10*3/MM3 (ref 0–0.4)
EOSINOPHIL NFR BLD AUTO: 0.6 % (ref 0.3–6.2)
ERYTHROCYTE [DISTWIDTH] IN BLOOD BY AUTOMATED COUNT: 11.8 % (ref 12.3–15.4)
GLOBULIN UR ELPH-MCNC: 2.5 GM/DL
GLUCOSE SERPL-MCNC: 89 MG/DL (ref 65–99)
HCT VFR BLD AUTO: 40.3 % (ref 34–46.6)
HGB BLD-MCNC: 13.6 G/DL (ref 12–15.9)
IMM GRANULOCYTES # BLD AUTO: 0.02 10*3/MM3 (ref 0–0.05)
IMM GRANULOCYTES NFR BLD AUTO: 0.3 % (ref 0–0.5)
LYMPHOCYTES # BLD AUTO: 1.15 10*3/MM3 (ref 0.7–3.1)
LYMPHOCYTES NFR BLD AUTO: 18.2 % (ref 19.6–45.3)
MCH RBC QN AUTO: 32.4 PG (ref 26.6–33)
MCHC RBC AUTO-ENTMCNC: 33.7 G/DL (ref 31.5–35.7)
MCV RBC AUTO: 96 FL (ref 79–97)
MONOCYTES # BLD AUTO: 0.8 10*3/MM3 (ref 0.1–0.9)
MONOCYTES NFR BLD AUTO: 12.6 % (ref 5–12)
NEUTROPHILS NFR BLD AUTO: 4.3 10*3/MM3 (ref 1.7–7)
NEUTROPHILS NFR BLD AUTO: 68 % (ref 42.7–76)
NRBC BLD AUTO-RTO: 0 /100 WBC (ref 0–0.2)
PLATELET # BLD AUTO: 330 10*3/MM3 (ref 140–450)
PMV BLD AUTO: 9.9 FL (ref 6–12)
POTASSIUM SERPL-SCNC: 3.8 MMOL/L (ref 3.5–5.2)
PROT SERPL-MCNC: 6.4 G/DL (ref 6–8.5)
RBC # BLD AUTO: 4.2 10*6/MM3 (ref 3.77–5.28)
SARS-COV-2 ORF1AB RESP QL NAA+PROBE: NOT DETECTED
SODIUM SERPL-SCNC: 141 MMOL/L (ref 136–145)
WBC NRBC COR # BLD: 6.33 10*3/MM3 (ref 3.4–10.8)

## 2022-08-05 PROCEDURE — 36415 COLL VENOUS BLD VENIPUNCTURE: CPT

## 2022-08-05 PROCEDURE — U0004 COV-19 TEST NON-CDC HGH THRU: HCPCS

## 2022-08-05 PROCEDURE — C9803 HOPD COVID-19 SPEC COLLECT: HCPCS

## 2022-08-05 PROCEDURE — 85025 COMPLETE CBC W/AUTO DIFF WBC: CPT

## 2022-08-05 PROCEDURE — 80053 COMPREHEN METABOLIC PANEL: CPT

## 2022-08-05 NOTE — DISCHARGE INSTRUCTIONS
Take the following medications the morning of surgery:    NONE    ARRIVE AT 9:45    If you are on prescription narcotic pain medication to control your pain you may also take that medication the morning of surgery.    General Instructions:  Do not eat solid food after midnight the night before surgery.  You may drink clear liquids day of surgery but must stop at least one hour before your hospital arrival time.  It is beneficial for you to have a clear drink that contains carbohydrates the day of surgery.  We suggest a 12 to 20 ounce bottle of Gatorade or Powerade for non-diabetic patients or a 12 to 20 ounce bottle of G2 or Powerade Zero for diabetic patients. (Pediatric patients, are not advised to drink a 12 to 20 ounce carbohydrate drink)    Clear liquids are liquids you can see through.  Nothing red in color.     Plain water                               Sports drinks  Sodas                                   Gelatin (Jell-O)  Fruit juices without pulp such as white grape juice and apple juice  Popsicles that contain no fruit or yogurt  Tea or coffee (no cream or milk added)  Gatorade / Powerade  G2 / Powerade Zero      Patients who avoid smoking, chewing tobacco and alcohol for 4 weeks prior to surgery have a reduced risk of post-operative complications.  Quit smoking as many days before surgery as you can.  Do not smoke, use chewing tobacco or drink alcohol the day of surgery.   If applicable bring your C-PAP/ BI-PAP machine.  Bring any papers given to you in the doctor’s office.  Wear clean comfortable clothes.  Do not wear contact lenses, false eyelashes or make-up.  Bring a case for your glasses.   Bring crutches or walker if applicable.  Remove all piercings.  Leave jewelry and any other valuables at home.  Hair extensions with metal clips must be removed prior to surgery.  The Pre-Admission Testing nurse will instruct you to bring medications if unable to obtain an accurate list in Pre-Admission Testing.             Preventing a Surgical Site Infection:  For 2 to 3 days before surgery, avoid shaving with a razor because the razor can irritate skin and make it easier to develop an infection.    Any areas of open skin can increase the risk of a post-operative wound infection by allowing bacteria to enter and travel throughout the body.  Notify your surgeon if you have any skin wounds / rashes even if it is not near the expected surgical site.  The area will need assessed to determine if surgery should be delayed until it is healed.  The night prior to surgery shower using a fresh bar of anti-bacterial soap (such as Dial) and clean washcloth.  Sleep in a clean bed with clean clothing.  Do not allow pets to sleep with you.  Shower on the morning of surgery using a fresh bar of anti-bacterial soap (such as Dial) and clean washcloth.  Dry with a clean towel and dress in clean clothing.  Ask your surgeon if you will be receiving antibiotics prior to surgery.  Make sure you, your family, and all healthcare providers clean their hands with soap and water or an alcohol based hand  before caring for you or your wound.    Day of surgery:  Your arrival time is approximately two hours before your scheduled surgery time.  Upon arrival, a Pre-op nurse and Anesthesiologist will review your health history, obtain vital signs, and answer questions you may have.  The only belongings needed at this time will be a list of your home medications and if applicable your C-PAP/BI-PAP machine.  A Pre-op nurse will start an IV and you may receive medication in preparation for surgery, including something to help you relax.     Please be aware that surgery does come with discomfort.  We want to make every effort to control your discomfort so please discuss any uncontrolled symptoms with your nurse.   Your doctor will most likely have prescribed pain medications.      If you are going home after surgery you will receive individualized  written care instructions before being discharged.  A responsible adult must drive you to and from the hospital on the day of your surgery and stay with you for 24 hours.  Discharge prescriptions can be filled by the hospital pharmacy during regular pharmacy hours.  If you are having surgery late in the day/evening your prescription may be e-prescribed to your pharmacy.  Please verify your pharmacy hours or chose a 24 hour pharmacy to avoid not having access to your prescription because your pharmacy has closed for the day.    If you are staying overnight following surgery, you will be transported to your hospital room following the recovery period.  [facility] has all private rooms.    If you have any questions please call Pre-Admission Testing at [phone].  Deductibles and co-payments are collected on the day of service. Please be prepared to pay the required co-pay, deductible or deposit on the day of service as defined by your plan.    Patient Education for Self-Quarantine Process    Following your COVID testing, we strongly recommend that you wear a mask when you are with other people and practice social distancing.   Limit your activities to only required outings.  Wash your hands with soap and water frequently for at least 20 seconds.   Avoid touching your eyes, nose and mouth with unwashed hands.  Do not share anything - utensils, drinking glasses, food from the same bowl.   Sanitize household surfaces daily. Include all high touch areas (door handles, light switches, phones, countertops, etc.)    Call your surgeon immediately if you experience any of the following symptoms:  Sore Throat  Shortness of Breath or difficulty breathing  Cough  Chills  Body soreness or muscle pain  Headache  Fever  New loss of taste or smell  Do not arrive for your surgery ill.  Your procedure will need to be rescheduled to another time.  You will need to call your physician before the day of surgery to avoid  any unnecessary exposure to hospital staff as well as other patients.

## 2022-08-07 ENCOUNTER — ANESTHESIA EVENT (OUTPATIENT)
Dept: PERIOP | Facility: HOSPITAL | Age: 77
End: 2022-08-07

## 2022-08-07 RX ORDER — CEFAZOLIN SODIUM 2 G/100ML
2 INJECTION, SOLUTION INTRAVENOUS ONCE
Status: DISCONTINUED | OUTPATIENT
Start: 2022-08-07 | End: 2022-08-08

## 2022-08-08 ENCOUNTER — HOSPITAL ENCOUNTER (OUTPATIENT)
Facility: HOSPITAL | Age: 77
Setting detail: HOSPITAL OUTPATIENT SURGERY
Discharge: HOME | End: 2022-08-08
Attending: ORTHOPAEDIC SURGERY | Admitting: ORTHOPAEDIC SURGERY

## 2022-08-08 ENCOUNTER — APPOINTMENT (OUTPATIENT)
Dept: GENERAL RADIOLOGY | Facility: HOSPITAL | Age: 77
End: 2022-08-08

## 2022-08-08 ENCOUNTER — ANESTHESIA (OUTPATIENT)
Dept: PERIOP | Facility: HOSPITAL | Age: 77
End: 2022-08-08

## 2022-08-08 VITALS
DIASTOLIC BLOOD PRESSURE: 78 MMHG | TEMPERATURE: 97.8 F | BODY MASS INDEX: 29 KG/M2 | OXYGEN SATURATION: 98 % | WEIGHT: 190.7 LBS | SYSTOLIC BLOOD PRESSURE: 136 MMHG | HEART RATE: 82 BPM | RESPIRATION RATE: 16 BRPM

## 2022-08-08 DIAGNOSIS — M77.52 BURSITIS OF LEFT FOOT: Primary | ICD-10-CM

## 2022-08-08 DIAGNOSIS — R22.42 MASS OF FOOT, LEFT: ICD-10-CM

## 2022-08-08 PROCEDURE — 25010000002 CEFAZOLIN IN DEXTROSE 2-4 GM/100ML-% SOLUTION: Performed by: ORTHOPAEDIC SURGERY

## 2022-08-08 PROCEDURE — 88304 TISSUE EXAM BY PATHOLOGIST: CPT | Performed by: ORTHOPAEDIC SURGERY

## 2022-08-08 PROCEDURE — 73620 X-RAY EXAM OF FOOT: CPT | Performed by: ORTHOPAEDIC SURGERY

## 2022-08-08 PROCEDURE — 25010000002 PROPOFOL 10 MG/ML EMULSION: Performed by: NURSE ANESTHETIST, CERTIFIED REGISTERED

## 2022-08-08 PROCEDURE — 25010000002 ONDANSETRON PER 1 MG: Performed by: NURSE ANESTHETIST, CERTIFIED REGISTERED

## 2022-08-08 PROCEDURE — 76000 FLUOROSCOPY <1 HR PHYS/QHP: CPT | Performed by: ORTHOPAEDIC SURGERY

## 2022-08-08 PROCEDURE — 25010000002 FENTANYL CITRATE (PF) 50 MCG/ML SOLUTION: Performed by: NURSE ANESTHETIST, CERTIFIED REGISTERED

## 2022-08-08 RX ORDER — PROPOFOL 10 MG/ML
VIAL (ML) INTRAVENOUS AS NEEDED
Status: DISCONTINUED | OUTPATIENT
Start: 2022-08-08 | End: 2022-08-08 | Stop reason: SURG

## 2022-08-08 RX ORDER — OXYCODONE HYDROCHLORIDE AND ACETAMINOPHEN 5; 325 MG/1; MG/1
1 TABLET ORAL EVERY 4 HOURS PRN
Qty: 25 TABLET | Refills: 0 | Status: SHIPPED | OUTPATIENT
Start: 2022-08-08 | End: 2022-09-21

## 2022-08-08 RX ORDER — HYDROCODONE BITARTRATE AND ACETAMINOPHEN 7.5; 325 MG/1; MG/1
2 TABLET ORAL EVERY 4 HOURS PRN
Status: COMPLETED | OUTPATIENT
Start: 2022-08-08 | End: 2022-08-08

## 2022-08-08 RX ORDER — MIDAZOLAM HYDROCHLORIDE 1 MG/ML
0.5 INJECTION INTRAMUSCULAR; INTRAVENOUS
Status: DISCONTINUED | OUTPATIENT
Start: 2022-08-08 | End: 2022-08-08 | Stop reason: HOSPADM

## 2022-08-08 RX ORDER — HYDRALAZINE HYDROCHLORIDE 20 MG/ML
5 INJECTION INTRAMUSCULAR; INTRAVENOUS
Status: DISCONTINUED | OUTPATIENT
Start: 2022-08-08 | End: 2022-08-08 | Stop reason: HOSPADM

## 2022-08-08 RX ORDER — ONDANSETRON 2 MG/ML
INJECTION INTRAMUSCULAR; INTRAVENOUS AS NEEDED
Status: DISCONTINUED | OUTPATIENT
Start: 2022-08-08 | End: 2022-08-08 | Stop reason: SURG

## 2022-08-08 RX ORDER — HYDROMORPHONE HYDROCHLORIDE 1 MG/ML
0.25 INJECTION, SOLUTION INTRAMUSCULAR; INTRAVENOUS; SUBCUTANEOUS
Status: DISCONTINUED | OUTPATIENT
Start: 2022-08-08 | End: 2022-08-08 | Stop reason: HOSPADM

## 2022-08-08 RX ORDER — PROMETHAZINE HYDROCHLORIDE 25 MG/1
25 SUPPOSITORY RECTAL ONCE AS NEEDED
Status: DISCONTINUED | OUTPATIENT
Start: 2022-08-08 | End: 2022-08-08 | Stop reason: HOSPADM

## 2022-08-08 RX ORDER — SODIUM CHLORIDE 0.9 % (FLUSH) 0.9 %
10 SYRINGE (ML) INJECTION EVERY 12 HOURS SCHEDULED
Status: DISCONTINUED | OUTPATIENT
Start: 2022-08-08 | End: 2022-08-08 | Stop reason: HOSPADM

## 2022-08-08 RX ORDER — LIDOCAINE HYDROCHLORIDE 20 MG/ML
INJECTION, SOLUTION INFILTRATION; PERINEURAL AS NEEDED
Status: DISCONTINUED | OUTPATIENT
Start: 2022-08-08 | End: 2022-08-08 | Stop reason: SURG

## 2022-08-08 RX ORDER — LABETALOL HYDROCHLORIDE 5 MG/ML
5 INJECTION, SOLUTION INTRAVENOUS
Status: DISCONTINUED | OUTPATIENT
Start: 2022-08-08 | End: 2022-08-08 | Stop reason: HOSPADM

## 2022-08-08 RX ORDER — ONDANSETRON 2 MG/ML
4 INJECTION INTRAMUSCULAR; INTRAVENOUS ONCE AS NEEDED
Status: DISCONTINUED | OUTPATIENT
Start: 2022-08-08 | End: 2022-08-08 | Stop reason: HOSPADM

## 2022-08-08 RX ORDER — NALOXONE HCL 0.4 MG/ML
0.2 VIAL (ML) INJECTION AS NEEDED
Status: DISCONTINUED | OUTPATIENT
Start: 2022-08-08 | End: 2022-08-08 | Stop reason: HOSPADM

## 2022-08-08 RX ORDER — SODIUM CHLORIDE 0.9 % (FLUSH) 0.9 %
10 SYRINGE (ML) INJECTION AS NEEDED
Status: DISCONTINUED | OUTPATIENT
Start: 2022-08-08 | End: 2022-08-08 | Stop reason: HOSPADM

## 2022-08-08 RX ORDER — DIPHENHYDRAMINE HCL 25 MG
25 CAPSULE ORAL
Status: DISCONTINUED | OUTPATIENT
Start: 2022-08-08 | End: 2022-08-08 | Stop reason: HOSPADM

## 2022-08-08 RX ORDER — DIPHENHYDRAMINE HYDROCHLORIDE 50 MG/ML
12.5 INJECTION INTRAMUSCULAR; INTRAVENOUS
Status: DISCONTINUED | OUTPATIENT
Start: 2022-08-08 | End: 2022-08-08 | Stop reason: HOSPADM

## 2022-08-08 RX ORDER — PROMETHAZINE HYDROCHLORIDE 25 MG/1
25 TABLET ORAL ONCE AS NEEDED
Status: DISCONTINUED | OUTPATIENT
Start: 2022-08-08 | End: 2022-08-08 | Stop reason: HOSPADM

## 2022-08-08 RX ORDER — SODIUM CHLORIDE, SODIUM LACTATE, POTASSIUM CHLORIDE, CALCIUM CHLORIDE 600; 310; 30; 20 MG/100ML; MG/100ML; MG/100ML; MG/100ML
9 INJECTION, SOLUTION INTRAVENOUS CONTINUOUS PRN
Status: DISCONTINUED | OUTPATIENT
Start: 2022-08-08 | End: 2022-08-08 | Stop reason: HOSPADM

## 2022-08-08 RX ORDER — EPHEDRINE SULFATE 50 MG/ML
5 INJECTION, SOLUTION INTRAVENOUS ONCE AS NEEDED
Status: DISCONTINUED | OUTPATIENT
Start: 2022-08-08 | End: 2022-08-08 | Stop reason: HOSPADM

## 2022-08-08 RX ORDER — CEFAZOLIN SODIUM 2 G/100ML
2 INJECTION, SOLUTION INTRAVENOUS ONCE
Status: COMPLETED | OUTPATIENT
Start: 2022-08-08 | End: 2022-08-08

## 2022-08-08 RX ORDER — LIDOCAINE HYDROCHLORIDE 10 MG/ML
0.5 INJECTION, SOLUTION EPIDURAL; INFILTRATION; INTRACAUDAL; PERINEURAL ONCE AS NEEDED
Status: DISCONTINUED | OUTPATIENT
Start: 2022-08-08 | End: 2022-08-08 | Stop reason: HOSPADM

## 2022-08-08 RX ORDER — FLUMAZENIL 0.1 MG/ML
0.2 INJECTION INTRAVENOUS AS NEEDED
Status: DISCONTINUED | OUTPATIENT
Start: 2022-08-08 | End: 2022-08-08 | Stop reason: HOSPADM

## 2022-08-08 RX ORDER — HYDROCODONE BITARTRATE AND ACETAMINOPHEN 5; 325 MG/1; MG/1
1 TABLET ORAL ONCE AS NEEDED
Status: DISCONTINUED | OUTPATIENT
Start: 2022-08-08 | End: 2022-08-08 | Stop reason: HOSPADM

## 2022-08-08 RX ORDER — FENTANYL CITRATE 50 UG/ML
25 INJECTION, SOLUTION INTRAMUSCULAR; INTRAVENOUS
Status: DISCONTINUED | OUTPATIENT
Start: 2022-08-08 | End: 2022-08-08 | Stop reason: HOSPADM

## 2022-08-08 RX ORDER — BUPIVACAINE HYDROCHLORIDE AND EPINEPHRINE 5; 5 MG/ML; UG/ML
INJECTION, SOLUTION EPIDURAL; INTRACAUDAL; PERINEURAL AS NEEDED
Status: DISCONTINUED | OUTPATIENT
Start: 2022-08-08 | End: 2022-08-08 | Stop reason: HOSPADM

## 2022-08-08 RX ORDER — FENTANYL CITRATE 50 UG/ML
INJECTION, SOLUTION INTRAMUSCULAR; INTRAVENOUS AS NEEDED
Status: DISCONTINUED | OUTPATIENT
Start: 2022-08-08 | End: 2022-08-08 | Stop reason: SURG

## 2022-08-08 RX ADMIN — FENTANYL CITRATE 25 MCG: 50 INJECTION INTRAMUSCULAR; INTRAVENOUS at 12:51

## 2022-08-08 RX ADMIN — PROPOFOL 50 MG: 10 INJECTION, EMULSION INTRAVENOUS at 12:28

## 2022-08-08 RX ADMIN — CEFAZOLIN SODIUM 2 G: 2 INJECTION, SOLUTION INTRAVENOUS at 12:15

## 2022-08-08 RX ADMIN — LIDOCAINE HYDROCHLORIDE 80 MG: 20 INJECTION, SOLUTION INFILTRATION; PERINEURAL at 12:26

## 2022-08-08 RX ADMIN — SODIUM CHLORIDE, POTASSIUM CHLORIDE, SODIUM LACTATE AND CALCIUM CHLORIDE 9 ML/HR: 600; 310; 30; 20 INJECTION, SOLUTION INTRAVENOUS at 10:51

## 2022-08-08 RX ADMIN — FENTANYL CITRATE 25 MCG: 50 INJECTION INTRAMUSCULAR; INTRAVENOUS at 12:42

## 2022-08-08 RX ADMIN — FENTANYL CITRATE 25 MCG: 50 INJECTION INTRAMUSCULAR; INTRAVENOUS at 12:26

## 2022-08-08 RX ADMIN — PROPOFOL 120 MG: 10 INJECTION, EMULSION INTRAVENOUS at 12:26

## 2022-08-08 RX ADMIN — ONDANSETRON 4 MG: 2 INJECTION INTRAMUSCULAR; INTRAVENOUS at 13:15

## 2022-08-08 RX ADMIN — FENTANYL CITRATE 25 MCG: 50 INJECTION INTRAMUSCULAR; INTRAVENOUS at 12:55

## 2022-08-08 RX ADMIN — HYDROCODONE BITARTRATE AND ACETAMINOPHEN 2 TABLET: 7.5; 325 TABLET ORAL at 13:57

## 2022-08-08 NOTE — ANESTHESIA PREPROCEDURE EVALUATION
Anesthesia Evaluation     Patient summary reviewed and Nursing notes reviewed   NPO Solid Status: > 8 hours             Airway   Mallampati: II  TM distance: >3 FB  Neck ROM: full  no difficulty expected  Dental - normal exam     Pulmonary - normal exam   (+) sleep apnea,   Cardiovascular - normal exam    (+) hypertension, dysrhythmias Atrial Fib,       Neuro/Psych- negative ROS  GI/Hepatic/Renal/Endo - negative ROS     Musculoskeletal (-) negative ROS    Abdominal  - normal exam   Substance History - negative use     OB/GYN negative ob/gyn ROS         Other                        Anesthesia Plan    ASA 3     general     intravenous induction     Anesthetic plan, risks, benefits, and alternatives have been provided, discussed and informed consent has been obtained with: patient.    Plan discussed with CRNA.        CODE STATUS:

## 2022-08-08 NOTE — OP NOTE
Procedure Note    Denise Staley  8/8/2022    Pre-op Diagnosis:   1.  Left plantar lateral midfoot soft tissue mass  2.  Prominent left fifth metatarsal base exostosis       Post-Op Diagnosis Codes:  Same    Procedure:  1.  Excision of deep left foot soft tissue mass, 2 cm x 2.5 cm (66920)  2.  Saucerization, left fifth metatarsal base (07380)    Surgeon(s):  Angelito Garcia Jr., MD    Assistants:  None    Anesthesia: LMA plus local block    Estimated Blood Loss: Minimal      Specimens:                Specimens     ID Source Type Tests Collected By Collected At Frozen?    A Foot, Left Tissue · TISSUE PATHOLOGY EXAM   Angelito Garcia Jr., MD 8/8/22 1312 No    Description: Left foot plantar soft tissue mass            Drains: * No LDAs found *    Complications:  None apparent    Disposition:  Stable to PACU for recovery    Indications for procedure:  The patient is a very pleasant 77-year-old female who has had progressive pain and dysfunction related to a prominent fifth metatarsal base of the left foot with a large palpable soft tissue mass.  She requested surgical intervention.  The above listed procedures were recommended.  The risks/benefits of surgery, including pain, infection, wound healing problems, need for future procedures, mass recurrence, DVT/PE, cardiac event, and/or death were discussed, and the patient elected to proceed with surgery.    Procedure in detail:  The correct patient was identified in preoperative holding.  All risks and benefits of surgery were again discussed in detail, and the patient agreed to proceed with surgery.  The operative extremity was confirmed and marked by myself.  Operative consent reviewed and confirmed to be signed by the patient and myself.    At this time, the patient was wheeled to the operative theatre and placed supine on the OR table.  LYLE/SCD placed on nonoperative leg. Anesthesia was induced smoothly by our anesthesia colleagues.   Well padded nonsterile  tourniquet placed on the upper thigh. The left lower extremity was prepped and draped in standard sterile fashion.  Appropriate presurgical timeout was performed, confirming correct patient, correct extremity, correct procedure, availability of sterile instruments/implants, and the administration of intravenous antibiotics in the form of Ancef within one hour of skin incision.    At this time, a longitudinal incision was made over the lateral aspect of the fifth metatarsal base.  Dissection was carried down bluntly.  A large bursal mass was immediately encountered predominantly plantar to the fifth metatarsal base but also extending laterally.  This was carefully excised from the underlying skin, subcutaneous tissue, muscle using combination of sharp and dull dissection.  It extended to the deep flexor brevis musculature of the foot.  It was excised in totality and was 2.5 cm x 2 cm.  It appeared to be in amalgamation of subcutaneous tissue, connective tissue without overtly malignant features perhaps consistent with a bursa.  It was sent in formalin as a permanent specimen to pathology.    At this time, with appropriate soft tissue protection, I used a small microsagittal saw to sharply saucerized the prominent lateral and plantar lateral base of the fifth metatarsal.  Fluoroscopy confirmed appropriate resection.  I resected flush with the insertion of the peroneus brevis, taking care not to injure the tendon itself.    A small hand rasp and the saw were used to smooth any rough edges.  The wound was kain irrigated with sterile saline.  Tourniquet let down.  Hemostasis achieved.  Wound was again irrigated and closed with 3-0 Vicryl and 2-0 nylon.  Xeroform, sterile gauze, soft compressive dressing replaced.  Patient was awoken from anesthesia without apparent complication and taken to PACU for recovery.              Angelito Garcia Jr, MD     Date: 8/8/2022  Time: 13:55 EDT

## 2022-08-08 NOTE — ANESTHESIA PROCEDURE NOTES
Airway  Urgency: elective    Date/Time: 8/8/2022 12:28 PM  Airway not difficult    General Information and Staff    Patient location during procedure: OR  Anesthesiologist: Marquise Burnett MD  CRNA/CAA: Chantale Reno CRNA    Indications and Patient Condition  Indications for airway management: airway protection    Preoxygenated: yes  MILS not maintained throughout  Mask difficulty assessment: 0 - not attempted    Final Airway Details  Final airway type: supraglottic airway      Successful airway: LMA and unique  Size 4    Number of attempts at approach: 1  Assessment: lips, teeth, and gum same as pre-op    Additional Comments  Airway exam prior to airway device insertion. Teeth/lips inspected. Preoxygenated with 100% O2; sniffing position. Eyes taped. Atraumatic device insertion. Airway device connected to anesthesia machine. Confirmed EBBS, +EtCO2. Lips and teeth intact, no damage.

## 2022-08-08 NOTE — ANESTHESIA POSTPROCEDURE EVALUATION
Patient: Denise Staley    Procedure Summary     Date: 08/08/22 Room / Location: [facility] OR [address] [place], [place] [address]U [place] [facility] [place]    Anesthesia Start: 1219 Anesthesia Stop: 1332    Procedure: LEFT EXCISION OF SOFT TISSUE MASS W/ SAUCERIZATION OF 5TH METATARSAL BASE (Left ) Diagnosis:     Surgeons: Angelito Garcia Jr., MD Provider: Angelito Huertas MD    Anesthesia Type: general ASA Status: 3          Anesthesia Type: general    Vitals  Vitals Value Taken Time   /80 08/08/22 1415   Temp 36.6 °C (97.8 °F) 08/08/22 1415   Pulse 78 08/08/22 1422   Resp 16 08/08/22 1415   SpO2 94 % 08/08/22 1422   Vitals shown include unvalidated device data.        Post Anesthesia Care and Evaluation    Patient location during evaluation: bedside  Patient participation: complete - patient participated  Level of consciousness: awake  Pain management: adequate    Airway patency: patent  Anesthetic complications: No anesthetic complications  PONV Status: controlled  Cardiovascular status: acceptable  Respiratory status: acceptable  Hydration status: acceptable    Comments: ---------------------------               08/08/22                      1415         ---------------------------   BP:          134/80         Pulse:         76           Resp:          16           Temp:   36.6 °C (97.8 °F)   SpO2:          98%         ---------------------------

## 2022-08-08 NOTE — H&P
[facility]   HISTORY AND PHYSICAL    Patient Name: Denise Staley  : 1945  MRN: [id number]  Primary Care Physician:  Mykel De Los Santos MD  Date of admission: 2022    Subjective   Subjective     Chief Complaint: Left foot pain    History of Present Illness     The patient is a very pleasant 77-year-old female who is developed progressive pain and dysfunction related to a prominent fifth metatarsal base with an associated bursa.  This is been refractory to appropriate nonoperative management.  She presents today for elective saucerization of the base of the fifth metatarsal with bursa excision.  Please refer to office H&P for full details.  No significant change in history, exam, plan.    Review of Systems   Review of Systems:  Constitutional: Negative  HENT: Negative  Eyes: Negative  Respiratory: Negative; no shortness of breath  Cardiovascular: Negative; no current chest pain  Gastrointestinal: Negative  : Negative  Skin: Negative except as listed in HPI  Neurological: Negative, no numbness or deficits  Hematological: Negative  Muscoloskeletal: Per HPI                   Personal History     Past Medical History:   Diagnosis Date   • AC (acromioclavicular) joint bone spurs 10/2021   • Arthritis    • Atrial fibrillation (HCC)    • Bursitis of both hips    • Fatigue    • Health care maintenance    • History of kidney stones    • Hyperlipidemia    • Hypertension    • Left foot pain    • Lower back pain    • Obesity    • TODD (obstructive sleep apnea) 2020    Home sleep study.  Weight 209 pounds.  At least mild TODD with AHI of 13.8 events per hour.  Low O2 saturation 69% and sleep-related hypoxia present for 234 minutes.  The patient snored 18% of the total monitoring time.   • SOB (shortness of breath) on exertion    • TSH elevation    • Ventricular hypertrophy        Past Surgical History:   Procedure Laterality Date   • CARDIOVERSION      X6   • CATARACT EXTRACTION, BILATERAL     •  EXTRACORPOREAL SHOCK WAVE LITHOTRIPSY (ESWL)     • HIP ARTHROPLASTY Bilateral     2006, 2014   • TONSILLECTOMY         Family History: family history includes Breast cancer in her maternal aunt; Heart attack in her sister; Heart disease in her brother and sister; Lung cancer (age of onset: 75) in her mother; No Known Problems in her father, maternal grandfather, maternal grandmother, paternal grandfather, and paternal grandmother; Ovarian cancer in her sister. Otherwise pertinent FHx was reviewed and not pertinent to current issue.    Social History:  reports that she has quit smoking. She has never used smokeless tobacco. She reports current alcohol use. She reports that she does not use drugs.    Home Medications:  CBD oil, Calcium Citrate-Vitamin D, Folic Acid, Loratadine, amLODIPine, atorvastatin, benazepril, cholecalciferol, diclofenac, fluticasone, hydroCHLOROthiazide, multivitamin, rivaroxaban, and traMADol    Allergies:  No Known Allergies    Objective    Objective     Vitals:   Temp:  [97.7 °F (36.5 °C)] 97.7 °F (36.5 °C)  Heart Rate:  [83] 83  Resp:  [16] 16  BP: (139)/(95) 139/95    Physical Exam  Physical Exam:  Vital signs reviewed.  Constitutional:  Appears well-developed, well nourished.  HEENT:  Head: normocephalic, atraumatic  Eyes: EOMI, grossly normal.  No scleral icterus.  Neck: Normal range of motion.  Supple, no tracheal deviation.  Cardiovascular: Regular rate.  Pulmonary: Effort normal, symmetric chest expansion, no labored breathing.  Abdominal: Soft, non distended  Neurological: No gross deficits, oriented to person, place, and time.  Skin: Warm and dry  Psychiatric: Normal mood and affect  Musculoskeletal:      Examination of her left foot shows a prominent bursa over very prominent fifth metatarsal base.       Active ankle dorsiflexion and plantarflexion.  Sensation is intact to light touch in sural, saphenous, deep and superficial peroneal, tibial nerve distribution.  Toes are warm and  well perfused with brisk capillary refill.       Assessment & Plan   Assessment / Plan     The patient is a very pleasant 77-year-old female who is developed progressive pain and dysfunction related to a prominent fifth metatarsal base with an associated bursa.  This is been refractory to appropriate nonoperative management.  She presents today for elective saucerization of the base of the fifth metatarsal with bursa excision.  Please refer to office H&P for full details.  No significant change in history, exam, plan.    The risks/benefits of surgery, including pain, infection, wound healing problems, need for future procedures, mal/nonunion, DVT/PE, cardiac event, and/or death were discussed, and the patient elected to proceed with surgery.      Angelito Garcia Jr, MD

## 2022-08-09 LAB
LAB AP CASE REPORT: NORMAL
PATH REPORT.FINAL DX SPEC: NORMAL
PATH REPORT.GROSS SPEC: NORMAL

## 2022-09-14 LAB
ALBUMIN SERPL-MCNC: 4.1 G/DL (ref 3.7–4.7)
ALBUMIN/GLOB SERPL: 1.9 {RATIO} (ref 1.2–2.2)
ALP SERPL-CCNC: 95 IU/L (ref 44–121)
ALT SERPL-CCNC: 17 IU/L (ref 0–32)
AMBIG ABBREV CMP14 DEFAULT: NORMAL
AMBIG ABBREV LP DEFAULT: NORMAL
AST SERPL-CCNC: 20 IU/L (ref 0–40)
BASOPHILS # BLD AUTO: 0 X10E3/UL (ref 0–0.2)
BASOPHILS NFR BLD AUTO: 0 %
BILIRUB SERPL-MCNC: 0.7 MG/DL (ref 0–1.2)
BUN SERPL-MCNC: 19 MG/DL (ref 8–27)
BUN/CREAT SERPL: 13 (ref 12–28)
CALCIUM SERPL-MCNC: 10.3 MG/DL (ref 8.7–10.3)
CHLORIDE SERPL-SCNC: 103 MMOL/L (ref 96–106)
CHOLEST SERPL-MCNC: 159 MG/DL (ref 100–199)
CO2 SERPL-SCNC: 21 MMOL/L (ref 20–29)
CREAT SERPL-MCNC: 1.41 MG/DL (ref 0.57–1)
EGFRCR-CYS SERPLBLD CKD-EPI 2021: 38 ML/MIN/1.73
EOSINOPHIL # BLD AUTO: 0.1 X10E3/UL (ref 0–0.4)
EOSINOPHIL NFR BLD AUTO: 2 %
ERYTHROCYTE [DISTWIDTH] IN BLOOD BY AUTOMATED COUNT: 12.2 % (ref 11.7–15.4)
GLOBULIN SER CALC-MCNC: 2.2 G/DL (ref 1.5–4.5)
GLUCOSE SERPL-MCNC: 80 MG/DL (ref 65–99)
HCT VFR BLD AUTO: 41.8 % (ref 34–46.6)
HDLC SERPL-MCNC: 51 MG/DL
HGB BLD-MCNC: 13.7 G/DL (ref 11.1–15.9)
IMM GRANULOCYTES # BLD AUTO: 0 X10E3/UL (ref 0–0.1)
IMM GRANULOCYTES NFR BLD AUTO: 0 %
LDLC SERPL CALC-MCNC: 85 MG/DL (ref 0–99)
LYMPHOCYTES # BLD AUTO: 1.5 X10E3/UL (ref 0.7–3.1)
LYMPHOCYTES NFR BLD AUTO: 22 %
MCH RBC QN AUTO: 31.9 PG (ref 26.6–33)
MCHC RBC AUTO-ENTMCNC: 32.8 G/DL (ref 31.5–35.7)
MCV RBC AUTO: 97 FL (ref 79–97)
MONOCYTES # BLD AUTO: 0.7 X10E3/UL (ref 0.1–0.9)
MONOCYTES NFR BLD AUTO: 10 %
NEUTROPHILS # BLD AUTO: 4.6 X10E3/UL (ref 1.4–7)
NEUTROPHILS NFR BLD AUTO: 66 %
PLATELET # BLD AUTO: 339 X10E3/UL (ref 150–450)
POTASSIUM SERPL-SCNC: 4.5 MMOL/L (ref 3.5–5.2)
PROT SERPL-MCNC: 6.3 G/DL (ref 6–8.5)
RBC # BLD AUTO: 4.3 X10E6/UL (ref 3.77–5.28)
SODIUM SERPL-SCNC: 142 MMOL/L (ref 134–144)
T4 FREE SERPL-MCNC: 1.3 NG/DL (ref 0.82–1.77)
TRIGL SERPL-MCNC: 130 MG/DL (ref 0–149)
TSH SERPL DL<=0.005 MIU/L-ACNC: 5.51 UIU/ML (ref 0.45–4.5)
VLDLC SERPL CALC-MCNC: 23 MG/DL (ref 5–40)
WBC # BLD AUTO: 7 X10E3/UL (ref 3.4–10.8)

## 2022-09-21 ENCOUNTER — OFFICE VISIT (OUTPATIENT)
Dept: FAMILY MEDICINE CLINIC | Facility: CLINIC | Age: 77
End: 2022-09-21

## 2022-09-21 VITALS
DIASTOLIC BLOOD PRESSURE: 68 MMHG | HEIGHT: 68 IN | RESPIRATION RATE: 18 BRPM | OXYGEN SATURATION: 98 % | WEIGHT: 189.2 LBS | HEART RATE: 84 BPM | TEMPERATURE: 97.8 F | SYSTOLIC BLOOD PRESSURE: 104 MMHG | BODY MASS INDEX: 28.67 KG/M2

## 2022-09-21 DIAGNOSIS — E78.5 HYPERLIPIDEMIA, UNSPECIFIED HYPERLIPIDEMIA TYPE: Primary | ICD-10-CM

## 2022-09-21 DIAGNOSIS — M54.50 CHRONIC LOW BACK PAIN WITHOUT SCIATICA, UNSPECIFIED BACK PAIN LATERALITY: ICD-10-CM

## 2022-09-21 DIAGNOSIS — G89.29 CHRONIC LOW BACK PAIN WITHOUT SCIATICA, UNSPECIFIED BACK PAIN LATERALITY: ICD-10-CM

## 2022-09-21 DIAGNOSIS — M19.90 ARTHRITIS: ICD-10-CM

## 2022-09-21 DIAGNOSIS — R79.89 TSH ELEVATION: ICD-10-CM

## 2022-09-21 DIAGNOSIS — I10 PRIMARY HYPERTENSION: ICD-10-CM

## 2022-09-21 DIAGNOSIS — N18.30 STAGE 3 CHRONIC KIDNEY DISEASE, UNSPECIFIED WHETHER STAGE 3A OR 3B CKD: ICD-10-CM

## 2022-09-21 PROCEDURE — 99214 OFFICE O/P EST MOD 30 MIN: CPT | Performed by: INTERNAL MEDICINE

## 2022-09-21 RX ORDER — TRAMADOL HYDROCHLORIDE 50 MG/1
50 TABLET ORAL 2 TIMES DAILY PRN
COMMUNITY

## 2022-09-21 NOTE — PROGRESS NOTES
Subjective   Denise Staley is a 77 y.o. female. Patient is here today for follow-up on her paroxysmal atrial fibrillation, hyperlipidemia, hypertension, chronic kidney disease stage III and arthritis.  Patient is making a reasonably good recovery from foot surgery although she continues to have significant pain and has not yet been cleared to get back with water aerobics.  She is using tramadol 50 mg once a day for pain relief.  She wants to continue on the diclofenac at a low dose.  Otherwise she has been stable.    Chief Complaint   Patient presents with   • Results     PT HERE FOR FOLLOW UP ON LABS          Vitals:    09/21/22 1527   BP: 104/68   Pulse: 84   Resp: 18   Temp: 97.8 °F (36.6 °C)   SpO2: 98%     Body mass index is 28.77 kg/m².  The following portions of the patient's history were reviewed and updated as appropriate: allergies, current medications, past family history, past medical history, past social history, past surgical history and problem list.    Past Medical History:   Diagnosis Date   • AC (acromioclavicular) joint bone spurs 10/2021   • Arthritis    • Atrial fibrillation (HCC)    • Bursitis of both hips    • Fatigue    • Health care maintenance    • History of kidney stones    • Hyperlipidemia    • Hypertension    • Left foot pain    • Lower back pain    • Obesity    • TODD (obstructive sleep apnea) 08/04/2020    Home sleep study.  Weight 209 pounds.  At least mild TODD with AHI of 13.8 events per hour.  Low O2 saturation 69% and sleep-related hypoxia present for 234 minutes.  The patient snored 18% of the total monitoring time.   • SOB (shortness of breath) on exertion    • TSH elevation    • Ventricular hypertrophy       No Known Allergies   Social History     Socioeconomic History   • Marital status:    Tobacco Use   • Smoking status: Former Smoker   • Smokeless tobacco: Never Used   • Tobacco comment: no caffeine use.  QUIT AGE 34   Vaping Use   • Vaping Use: Never used    Substance and Sexual Activity   • Alcohol use: Yes     Comment: VERY SELDOM   • Drug use: Never   • Sexual activity: Defer        Current Outpatient Medications:   •  amLODIPine (NORVASC) 10 MG tablet, TAKE 1 TABLET EVERY DAY (Patient taking differently: Take 10 mg by mouth Every Evening.), Disp: 90 tablet, Rfl: 3  •  atorvastatin (LIPITOR) 20 MG tablet, TAKE 1 TABLET EVERY DAY (Patient taking differently: Take 20 mg by mouth Every Night.), Disp: 90 tablet, Rfl: 3  •  benazepril (LOTENSIN) 40 MG tablet, TAKE 1 TABLET EVERY DAY (Patient taking differently: Take 40 mg by mouth Every Evening.), Disp: 90 tablet, Rfl: 3  •  Calcium Carbonate-Vitamin D (CALCIUM + D PO), Take 1 tablet by mouth Daily. PT HOLDING FOR SURGERY, Disp: , Rfl:   •  CBD oil (cannabidiol) capsule, Take 1 capsule by mouth Every Night. PT HOLDING FOR SURGERY, Disp: , Rfl:   •  Cholecalciferol (VITAMIN D) 1000 UNITS tablet, Take 1,000 Units by mouth 2 (Two) Times a Day., Disp: , Rfl:   •  diclofenac (VOLTAREN) 75 MG EC tablet, TAKE 1 TABLET EVERY DAY (Patient taking differently: Take 75 mg by mouth Every Morning. PT HOLDING FOR SURGERY), Disp: 90 tablet, Rfl: 1  •  fluticasone (FLONASE) 50 MCG/ACT nasal spray, 2 sprays into the nostril(s) as directed by provider Every Night., Disp: , Rfl:   •  FOLIC ACID PO, Take 400 mg by mouth Daily., Disp: , Rfl:   •  hydroCHLOROthiazide (HYDRODIURIL) 25 MG tablet, TAKE 1 TABLET EVERY DAY (Patient taking differently: Take 25 mg by mouth Daily.), Disp: 90 tablet, Rfl: 1  •  Loratadine 10 MG capsule, Take 1 tablet by mouth Every Night., Disp: , Rfl:   •  multivitamin (THERAGRAN) tablet tablet, Take 1 tablet by mouth Daily. PT HOLDING FOR SURGERY  Centrum Silver, Disp: , Rfl:   •  rivaroxaban (Xarelto) 15 MG tablet, Take 1 tablet by mouth Daily. (Patient taking differently: Take 15 mg by mouth Every Night. PT HOLDING FOR SURGERY), Disp: 90 tablet, Rfl: 2  •  traMADol (ULTRAM) 50 MG tablet, Take 50 mg by mouth 2  (Two) Times a Day As Needed for Pain., Disp: , Rfl:      Objective     History of Present Illness     Review of Systems    Physical Exam  Vitals and nursing note reviewed.   Constitutional:       General: She is not in acute distress.     Appearance: Normal appearance. She is not ill-appearing.   HENT:      Head: Normocephalic and atraumatic.   Cardiovascular:      Rate and Rhythm: Normal rate and regular rhythm.      Heart sounds: Normal heart sounds.   Pulmonary:      Effort: Pulmonary effort is normal. No respiratory distress.      Breath sounds: Normal breath sounds. No wheezing or rales.   Skin:     General: Skin is warm and dry.   Neurological:      General: No focal deficit present.      Mental Status: She is alert and oriented to person, place, and time.   Psychiatric:         Mood and Affect: Mood normal.         Behavior: Behavior normal.         ASSESSMENT CBC is normal.  CMP has a stable creatinine of 1.41 and was otherwise normal.  Lipid panel is total cholesterol 159, HDL 51, LDL 85.  TSH is a bit high at 5.51 but stable and free T4 continues normal at 1.3  #1-hypertension controlled on medication  #2-hyperlipidemia controlled on medication  #3-atrial fibrillation, rate controlled and stable  #4-chronic kidney disease stage III, stable and asymptomatic  #5-apparent reasonable recovery from recent foot surgery  #6-chronic arthritis in the knees and low back, stable       Problems Addressed this Visit        Cardiac and Vasculature    Hyperlipidemia - Primary    Hypertension       Genitourinary and Reproductive     Stage 3 chronic kidney disease (HCC)       Musculoskeletal and Injuries    Arthritis    Low back pain       Symptoms and Signs    TSH elevation      Diagnoses       Codes Comments    Hyperlipidemia, unspecified hyperlipidemia type    -  Primary ICD-10-CM: E78.5  ICD-9-CM: 272.4     Primary hypertension     ICD-10-CM: I10  ICD-9-CM: 401.9     Stage 3 chronic kidney disease, unspecified whether  stage 3a or 3b CKD (HCC)     ICD-10-CM: N18.30  ICD-9-CM: 585.3     Chronic low back pain without sciatica, unspecified back pain laterality     ICD-10-CM: M54.50, G89.29  ICD-9-CM: 724.2, 338.29     TSH elevation     ICD-10-CM: R79.89  ICD-9-CM: 794.5     Arthritis     ICD-10-CM: M19.90  ICD-9-CM: 716.90           PLAN the patient can get a flu shot and a COVID-19 vaccination anytime.  She will continue tramadol 50 mg as needed for pain control.  She will continue her current medicines as now.  Plan on rechecking her in 6 months with laboratory studies    There are no Patient Instructions on file for this visit.  Return in about 6 months (around 3/21/2023) for with labs.   Greater than or equal to 65

## 2023-01-03 ENCOUNTER — OFFICE VISIT (OUTPATIENT)
Dept: CARDIOLOGY | Facility: CLINIC | Age: 78
End: 2023-01-03
Payer: MEDICARE

## 2023-01-03 VITALS
OXYGEN SATURATION: 96 % | BODY MASS INDEX: 29.73 KG/M2 | SYSTOLIC BLOOD PRESSURE: 120 MMHG | HEIGHT: 67 IN | WEIGHT: 189.4 LBS | DIASTOLIC BLOOD PRESSURE: 80 MMHG | HEART RATE: 89 BPM

## 2023-01-03 DIAGNOSIS — I48.11 LONGSTANDING PERSISTENT ATRIAL FIBRILLATION: Primary | ICD-10-CM

## 2023-01-03 PROCEDURE — 1160F RVW MEDS BY RX/DR IN RCRD: CPT | Performed by: INTERNAL MEDICINE

## 2023-01-03 PROCEDURE — 3079F DIAST BP 80-89 MM HG: CPT | Performed by: INTERNAL MEDICINE

## 2023-01-03 PROCEDURE — 99213 OFFICE O/P EST LOW 20 MIN: CPT | Performed by: INTERNAL MEDICINE

## 2023-01-03 PROCEDURE — 1159F MED LIST DOCD IN RCRD: CPT | Performed by: INTERNAL MEDICINE

## 2023-01-03 PROCEDURE — 3074F SYST BP LT 130 MM HG: CPT | Performed by: INTERNAL MEDICINE

## 2023-01-03 NOTE — PROGRESS NOTES
[facility]      Patient Name: Denise Staley  :1945  Age: 77 y.o.  Encounter Provider:  Angelito Velazquez Jr, MD      Chief Complaint: Follow-up persistent atrial fibrillation    Hypertension  Associated symptoms include malaise/fatigue. Pertinent negatives include no chest pain, palpitations or shortness of breath.   Atrial Fibrillation  Symptoms are negative for chest pain, dizziness, palpitations and shortness of breath. Past medical history includes atrial fibrillation.     Denise Staley is a 77 y.o. female past medical history of paroxysmal atrial fibrillation on amiodarone therapy long-term present for routine follow-up.      Summary of clinic visitation: Patient has been doing fairly well but feels functional capacity is decreased as she is unable to participate in her weekly aquatic therapy sessions.  She is limited in her ability to walk secondary to severe osteoarthritis and has not been getting her periodic steroid injections for bilateral knees.  With her activity around the home she denies any chest pain, shortness of air or palpitations.  No dizziness or syncope.  No orthopnea, PND or edema above baseline.  She does know when she is in atrial fibrillation states that she was out of rhythm on  for a few hours which resolved after she took an extra amiodarone dose.  She has not been able to take beta-blocker in the past due to her very low resting heart rate.  She has no bleeding complications associated with rivaroxaban therapy.  Social and family history reviewed and are not pertinent to this clinic visit. She is doing well since last visit.  Seen by her ophthalmologist with no evidence of amiodarone toxicity.  Chest x-ray was not performed since last visit.  TSH slightly elevated with normal T4.  LFTs within normal range.  She had 1 episode of atrial fibrillation per her report for approximately 5 days which ultimately resolved.  She is tolerating Xarelto  well with no bleeding complications.  Social and family history reviewed are not pertinent to this clinic visit. Patient has been in atrial fibrillation since May.  She saw Dr. Lopez who discussed all options with her.  Decision was made for ongoing rate control.  She notes continued shortness of air.  She has occasional swelling from the ankles to toes.  She feels this is improved but is noticed two episodes of dizziness over the past few months one of which was in the shower.  No loss of consciousness.  She still has palpitations on occasion.  She feels increased fatigue and general malaise but no other cardiac symptoms.    Doing well since last visit.  No further dizzy spells.  Heart rate is well controlled.  She remains in atrial fibrillation.  Tolerating DOAC well.  She does note increased cost especially during the period of the donut hole but states that it is feasible and she does not want to switch to Coumadin.  No cardiac complaints at time of interview.    The following portions of the patient's history were reviewed and updated as appropriate: allergies, current medications, past family history, past medical history, past social history, past surgical history and problem list.      Review of Systems   Constitutional: Positive for malaise/fatigue. Negative for fever.   Cardiovascular: Negative for chest pain, dyspnea on exertion, leg swelling, near-syncope and palpitations.   Respiratory: Negative for cough and shortness of breath.    Musculoskeletal: Positive for arthritis and joint pain.   Neurological: Negative for dizziness, light-headedness, numbness and paresthesias.   All other systems reviewed and are negative.    ROS was reviewed, updated and amended when necessary.      OBJECTIVE:   Vital Signs  Vitals:    01/03/23 1245   BP: 120/80   Pulse: 89   SpO2: 96%     Estimated body mass index is 30.11 kg/m² as calculated from the following:    Height as of this encounter: 168.9 cm (66.5\").    Weight  as of this encounter: 85.9 kg (189 lb 6.4 oz).    Physical Exam  Vitals reviewed.   Constitutional:       Appearance: She is well-developed.   HENT:      Head: Normocephalic and atraumatic.   Eyes:      Conjunctiva/sclera: Conjunctivae normal.      Pupils: Pupils are equal, round, and reactive to light.   Neck:      Thyroid: No thyromegaly.      Vascular: No JVD.   Cardiovascular:      Rate and Rhythm: Rhythm irregular.      Heart sounds: No murmur heard.    No friction rub. No gallop.   Pulmonary:      Effort: No respiratory distress.   Chest:      Chest wall: No tenderness.   Abdominal:      General: Bowel sounds are normal. There is no distension.   Musculoskeletal:         General: No tenderness.   Skin:     Findings: No erythema or rash.   Neurological:      Mental Status: She is alert and oriented to person, place, and time.   Psychiatric:         Judgment: Judgment normal.         Procedures          ASSESSMENT:     PAF  Chronic amiodarone use  Chronic anticoagulation use    PLAN OF CARE:     1. PAF -amiodarone discontinued.  Rate controlled atrial fibrillation on last Holter monitor..  Not currently on rate control agents.  Continue Xarelto therapy.  2. Shortness of air -symptoms improved.  Increased activity.  3. Snoring and hypersomnia -diagnosed with mild sleep apnea but does not want to start CPAP therapy.    Return to clinic 6 months                 Discharge Medications          Accurate as of January 3, 2023 12:47 PM. If you have any questions, ask your nurse or doctor.            Changes to Medications      Instructions Start Date   amLODIPine 10 MG tablet  Commonly known as: NORVASC  What changed: when to take this   TAKE 1 TABLET EVERY DAY      atorvastatin 20 MG tablet  Commonly known as: LIPITOR  What changed: when to take this   TAKE 1 TABLET EVERY DAY      benazepril 40 MG tablet  Commonly known as: LOTENSIN  What changed: when to take this   TAKE 1 TABLET EVERY DAY      diclofenac 75 MG EC  tablet  Commonly known as: VOLTAREN  What changed:   · when to take this  · additional instructions   TAKE 1 TABLET EVERY DAY      rivaroxaban 15 MG tablet  Commonly known as: Xarelto  What changed:   · when to take this  · additional instructions   15 mg, Oral, Daily         Continue These Medications      Instructions Start Date   CALCIUM + D PO   1 tablet, Oral, Daily, PT HOLDING FOR SURGERY      CBD oil capsule  Commonly known as: cannabidiol   1 capsule, Oral, Nightly, PT HOLDING FOR SURGERY      cholecalciferol 25 MCG (1000 UT) tablet  Commonly known as: VITAMIN D3   1,000 Units, Oral, 2 Times Daily      fluticasone 50 MCG/ACT nasal spray  Commonly known as: FLONASE   2 sprays, Nasal, Nightly      FOLIC ACID PO   400 mg, Oral, Daily      hydroCHLOROthiazide 25 MG tablet  Commonly known as: HYDRODIURIL   TAKE 1 TABLET EVERY DAY      Loratadine 10 MG capsule   1 tablet, Oral, Nightly      multivitamin tablet tablet   1 tablet, Oral, Daily, PT HOLDING FOR SURGERY  Centrum Silver      traMADol 50 MG tablet  Commonly known as: ULTRAM   50 mg, Oral, 2 Times Daily PRN             Thank you for allowing me to participate in the care of your patient,      Sincerely,   Angelito Velazquez MD  [facility]  01/03/23  12:47 EST

## 2023-03-28 RX ORDER — HYDROCHLOROTHIAZIDE 25 MG/1
TABLET ORAL
Qty: 90 TABLET | Refills: 1 | Status: SHIPPED | OUTPATIENT
Start: 2023-03-28

## 2023-03-30 ENCOUNTER — OFFICE VISIT (OUTPATIENT)
Dept: FAMILY MEDICINE CLINIC | Facility: CLINIC | Age: 78
End: 2023-03-30
Payer: MEDICARE

## 2023-03-30 VITALS
BODY MASS INDEX: 31.02 KG/M2 | OXYGEN SATURATION: 96 % | DIASTOLIC BLOOD PRESSURE: 80 MMHG | SYSTOLIC BLOOD PRESSURE: 116 MMHG | HEIGHT: 66 IN | HEART RATE: 80 BPM | RESPIRATION RATE: 15 BRPM | WEIGHT: 193 LBS | TEMPERATURE: 96.8 F

## 2023-03-30 DIAGNOSIS — R79.89 TSH ELEVATION: ICD-10-CM

## 2023-03-30 DIAGNOSIS — I48.11 LONGSTANDING PERSISTENT ATRIAL FIBRILLATION: ICD-10-CM

## 2023-03-30 DIAGNOSIS — M54.41 CHRONIC RIGHT-SIDED LOW BACK PAIN WITH RIGHT-SIDED SCIATICA: ICD-10-CM

## 2023-03-30 DIAGNOSIS — I10 PRIMARY HYPERTENSION: Primary | ICD-10-CM

## 2023-03-30 DIAGNOSIS — E78.5 HYPERLIPIDEMIA, UNSPECIFIED HYPERLIPIDEMIA TYPE: ICD-10-CM

## 2023-03-30 DIAGNOSIS — N18.30 STAGE 3 CHRONIC KIDNEY DISEASE, UNSPECIFIED WHETHER STAGE 3A OR 3B CKD: ICD-10-CM

## 2023-03-30 DIAGNOSIS — G89.29 CHRONIC RIGHT-SIDED LOW BACK PAIN WITH RIGHT-SIDED SCIATICA: ICD-10-CM

## 2023-03-30 DIAGNOSIS — R60.9 DEPENDENT EDEMA: ICD-10-CM

## 2023-03-30 NOTE — PROGRESS NOTES
Subjective   Denise Staley is a 77 y.o. female. Patient is here today for follow-up on her hypertension, hyperlipidemia, atrial fibrillation, chronic kidney disease stage III, low back and bilateral knee and hip pain..  Patient does see orthopedics and is going to pain clinic.  Her primary complaint is of her arthritic pain.  She has been holding the diclofenac because of kidney concerns  Chief Complaint   Patient presents with   • Hypertension          Vitals:    03/30/23 1317   BP: 116/80   Pulse: 80   Resp: 15   Temp: 96.8 °F (36 °C)   SpO2: 96%     Body mass index is 30.69 kg/m².  The following portions of the patient's history were reviewed and updated as appropriate: allergies, current medications, past family history, past medical history, past social history, past surgical history and problem list.    Past Medical History:   Diagnosis Date   • AC (acromioclavicular) joint bone spurs 10/2021   • Arthritis    • Atrial fibrillation (HCC)    • Bursitis of both hips    • Fatigue    • Health care maintenance    • History of kidney stones    • Hyperlipidemia    • Hypertension    • Left foot pain    • Lower back pain    • Obesity    • TODD (obstructive sleep apnea) 08/04/2020    Home sleep study.  Weight 209 pounds.  At least mild TODD with AHI of 13.8 events per hour.  Low O2 saturation 69% and sleep-related hypoxia present for 234 minutes.  The patient snored 18% of the total monitoring time.   • SOB (shortness of breath) on exertion    • TSH elevation    • Ventricular hypertrophy       No Known Allergies   Social History     Socioeconomic History   • Marital status:    Tobacco Use   • Smoking status: Former   • Smokeless tobacco: Never   • Tobacco comments:     no caffeine use.  QUIT AGE 34   Vaping Use   • Vaping Use: Never used   Substance and Sexual Activity   • Alcohol use: Yes     Comment: VERY SELDOM   • Drug use: Never   • Sexual activity: Defer        Current Outpatient Medications:   •   amLODIPine (NORVASC) 10 MG tablet, TAKE 1 TABLET EVERY DAY (Patient taking differently: Take 1 tablet by mouth Every Evening.), Disp: 90 tablet, Rfl: 3  •  atorvastatin (LIPITOR) 20 MG tablet, TAKE 1 TABLET EVERY DAY (Patient taking differently: Take 1 tablet by mouth Every Night.), Disp: 90 tablet, Rfl: 3  •  benazepril (LOTENSIN) 40 MG tablet, TAKE 1 TABLET EVERY DAY (Patient taking differently: Take 1 tablet by mouth Every Evening.), Disp: 90 tablet, Rfl: 3  •  Calcium Carbonate-Vitamin D (CALCIUM + D PO), Take 1 tablet by mouth Daily. PT HOLDING FOR SURGERY, Disp: , Rfl:   •  Cholecalciferol (VITAMIN D) 1000 UNITS tablet, Take 1 tablet by mouth 2 (Two) Times a Day., Disp: , Rfl:   •  diclofenac (VOLTAREN) 75 MG EC tablet, TAKE 1 TABLET EVERY DAY (Patient taking differently: Take 1 tablet by mouth Every Morning. PT HOLDING FOR SURGERY), Disp: 90 tablet, Rfl: 1  •  fluticasone (FLONASE) 50 MCG/ACT nasal spray, 2 sprays into the nostril(s) as directed by provider Every Night., Disp: , Rfl:   •  FOLIC ACID PO, Take 400 mg by mouth Daily., Disp: , Rfl:   •  hydroCHLOROthiazide (HYDRODIURIL) 25 MG tablet, TAKE 1 TABLET EVERY DAY, Disp: 90 tablet, Rfl: 1  •  Loratadine 10 MG capsule, Take 1 capsule by mouth Every Night., Disp: , Rfl:   •  multivitamin (THERAGRAN) tablet tablet, Take 1 tablet by mouth Daily. PT HOLDING FOR SURGERY  Centrum Silver, Disp: , Rfl:   •  rivaroxaban (Xarelto) 15 MG tablet, Take 1 tablet by mouth Daily. (Patient taking differently: Take 1 tablet by mouth Every Night. PT HOLDING FOR SURGERY), Disp: 90 tablet, Rfl: 2  •  traMADol (ULTRAM) 50 MG tablet, Take 1 tablet by mouth 2 (Two) Times a Day As Needed for Pain., Disp: , Rfl:      Objective     History of Present Illness     Review of Systems    Physical Exam  Vitals and nursing note reviewed.   Constitutional:       General: She is not in acute distress.     Appearance: Normal appearance. She is obese. She is not ill-appearing.      Comments:  Ambulating slowly with a cane because of hip and back pain   HENT:      Head: Normocephalic and atraumatic.   Cardiovascular:      Rate and Rhythm: Normal rate and regular rhythm.   Pulmonary:      Effort: Pulmonary effort is normal. No respiratory distress.      Breath sounds: Normal breath sounds. No wheezing or rales.   Skin:     General: Skin is warm and dry.   Neurological:      General: No focal deficit present.      Mental Status: She is alert and oriented to person, place, and time.   Psychiatric:         Mood and Affect: Mood normal.         Behavior: Behavior normal.         ASSESSMENT CBC is normal.  CMP had an improved creatinine of 1.1, calcium 10.4 and other values normal and lipid panel is total cholesterol 146, HDL 50, LDL 74.  TSH was normal at 3.2 and free T4 is normal at 1.22.  Vitamin D level is normal at 61.6  #1-chronic atrial fibrillation  #2-hypertension controlled on medication  #3-hyperlipidemia, controlled on medication  #4-chronic kidney disease stage III, improved off diclofenac  #5-chronic low back pain  #6-chronic knee pain       Problems Addressed this Visit        Cardiac and Vasculature    Atrial fibrillation (HCC)    Hyperlipidemia    Hypertension - Primary       Genitourinary and Reproductive     Stage 3 chronic kidney disease (HCC)       Symptoms and Signs    Dependent edema    TSH elevation   Diagnoses       Codes Comments    Primary hypertension    -  Primary ICD-10-CM: I10  ICD-9-CM: 401.9     Hyperlipidemia, unspecified hyperlipidemia type     ICD-10-CM: E78.5  ICD-9-CM: 272.4     Longstanding persistent atrial fibrillation (HCC)     ICD-10-CM: I48.11  ICD-9-CM: 427.31     Stage 3 chronic kidney disease, unspecified whether stage 3a or 3b CKD (HCC)     ICD-10-CM: N18.30  ICD-9-CM: 585.3     TSH elevation     ICD-10-CM: R79.89  ICD-9-CM: 794.5     Dependent edema     ICD-10-CM: R60.9  ICD-9-CM: 782.3           PLAN patient will use Tylenol and continue current medicines as  now.  She does have orthopedic follow-up for knee injections and is referred to pain management for her back pain.  Her renal function seems improved off the diclofenac and I encouraged her to continue to stay off it and avoid the NSAIDs.  She will continue current medicines as now and I would like to recheck her in 6 months with a wellness visit with a CBC, CMP, lipid panel    There are no Patient Instructions on file for this visit.  No follow-ups on file.

## 2023-04-12 ENCOUNTER — TELEPHONE (OUTPATIENT)
Dept: CARDIOLOGY | Facility: CLINIC | Age: 78
End: 2023-04-12
Payer: MEDICARE

## 2023-04-12 NOTE — TELEPHONE ENCOUNTER
We received a fax regarding Ms. Staley.  She would like to have a transforaminal epidural steroid injection and they would like pt to hold her Xarelto for 3 days prior.  Are you okay with this?  Thanks/matif    [facility] # [phone]  Fax# [phone]

## 2023-04-13 NOTE — TELEPHONE ENCOUNTER
I tried to call Ms. Staley but was unable to leave a voicemail(not set up), I also called [facility] and s/w Corinna.  She understands verbally./irma

## 2023-05-02 RX ORDER — RIVAROXABAN 15 MG/1
TABLET, FILM COATED ORAL
Qty: 90 TABLET | Refills: 2 | Status: SHIPPED | OUTPATIENT
Start: 2023-05-02

## 2023-06-27 ENCOUNTER — TELEPHONE (OUTPATIENT)
Dept: CARDIOLOGY | Facility: CLINIC | Age: 78
End: 2023-06-27

## 2023-06-27 NOTE — TELEPHONE ENCOUNTER
Caller: Denise Staley    Relationship: Self    Best call back number: [phone]    What is the best time to reach you: ANYTIME    Who are you requesting to speak with (clinical staff, provider,  specific staff member): ANYONE    What was the call regarding: PT IS CALLING TO RESCHEDULE APPT BECAUSE SHE DOESN'T HAVE ANY POWER AND HAD 2 TREES FALL ON HER HOUSE. SHE SAID SHE CAN DO JULY 12,13,14 BETWEEN 11AM AND 1PM IF POSSIBLE.    Is it okay if the provider responds through [employer]: NO

## 2023-08-28 RX ORDER — BENAZEPRIL HYDROCHLORIDE 40 MG/1
TABLET, FILM COATED ORAL
Qty: 90 TABLET | Refills: 3 | Status: SHIPPED | OUTPATIENT
Start: 2023-08-28

## 2024-01-09 ENCOUNTER — OFFICE VISIT (OUTPATIENT)
Dept: CARDIOLOGY | Facility: CLINIC | Age: 79
End: 2024-01-09
Payer: MEDICARE

## 2024-01-09 VITALS
HEIGHT: 67 IN | DIASTOLIC BLOOD PRESSURE: 83 MMHG | HEART RATE: 105 BPM | OXYGEN SATURATION: 95 % | BODY MASS INDEX: 30.23 KG/M2 | WEIGHT: 192.6 LBS | SYSTOLIC BLOOD PRESSURE: 130 MMHG

## 2024-01-09 DIAGNOSIS — E78.5 HYPERLIPIDEMIA, UNSPECIFIED HYPERLIPIDEMIA TYPE: ICD-10-CM

## 2024-01-09 DIAGNOSIS — R60.0 BILATERAL LOWER EXTREMITY EDEMA: Primary | ICD-10-CM

## 2024-01-09 DIAGNOSIS — I10 PRIMARY HYPERTENSION: ICD-10-CM

## 2024-01-09 DIAGNOSIS — I48.11 LONGSTANDING PERSISTENT ATRIAL FIBRILLATION: ICD-10-CM

## 2024-01-09 RX ORDER — SENNOSIDES 8.6 MG
650 CAPSULE ORAL EVERY 8 HOURS PRN
COMMUNITY

## 2024-01-09 NOTE — PROGRESS NOTES
"Subjective     Denise Staley is a 78 y.o. female who presents today for Establish Care (Cardiac Eval. /Referral from PCP for A-fib).    CHIEF COMPLIANT  Chief Complaint   Patient presents with    Establish Care     Cardiac Eval.   Referral from PCP for A-fib       Active Problems:  1.  Persistent atrial fibrillation, on Xarelto.  Status post multiple ablations.  2.  Essential hypertension  3.  Dyslipidemia  4.  Obstructive sleep apnea  5.  Echocardiogram 2021 with EF 66 to 70%  6.  Significant osteoarthritis     HPI  The patient is a 78-year-old female that was referred to establish care for further cardiac evaluation.  She previously followed with cardiology in [place] but has relocated to [place].  She does have persistent atrial fibrillation and is on Xarelto for long-term anticoagulation.  The patient states she has underwent approximately 6 cardioversions and none have been successful.  Her previous cardiology did discuss the option of ablation but the patient did not want to pursue this.  Her EKG today does show atrial fibrillation, rate 98, left axis deviation.  The patient states that over the past few months she has developed increasing bilateral lower extremity edema.  This will get progressively worse throughout the day and is better in the mornings after she has been in bed all night.  She was instructed to wear compression stockings by primary care and to keep her feet elevated is much as possible.  This has only slightly improved her symptoms.  She does very seldomly have what she described as \"a twinge of chest pain.\"  She states this occurs randomly and is nothing significant.  She states this has been going on for years.  She denies worsening dyspnea, syncope, near syncope.  For the most part she is relatively asymptomatic with her A-fib but does occasionally feel palpitations if her heart rate becomes elevated.  She states this does not occur often.  She has noted over the past several " months that her balance is not as good as it used to.  She is now using a cane to assist with ambulation when in public and at home she utilizes a walker.    PRIOR MEDS  Current Outpatient Medications on File Prior to Visit   Medication Sig Dispense Refill    acetaminophen (TYLENOL) 650 MG 8 hr tablet Take 1 tablet by mouth Every 8 (Eight) Hours As Needed for Mild Pain.      amLODIPine (NORVASC) 10 MG tablet TAKE 1 TABLET EVERY DAY 90 tablet 3    atorvastatin (LIPITOR) 20 MG tablet TAKE 1 TABLET EVERY DAY 90 tablet 3    benazepril (LOTENSIN) 40 MG tablet TAKE 1 TABLET EVERY DAY 90 tablet 3    Cholecalciferol (VITAMIN D) 1000 UNITS tablet Take 1 tablet by mouth 2 (Two) Times a Day.      fluticasone (FLONASE) 50 MCG/ACT nasal spray 2 sprays into the nostril(s) as directed by provider Every Night.      FOLIC ACID PO Take 400 mg by mouth Daily.      hydroCHLOROthiazide (HYDRODIURIL) 25 MG tablet TAKE 1 TABLET EVERY DAY 90 tablet 1    Loratadine 10 MG capsule Take 1 capsule by mouth Every Night.      multivitamin (THERAGRAN) tablet tablet Take 1 tablet by mouth Daily. PT HOLDING FOR SURGERY   Centrum Silver      traMADol (ULTRAM) 50 MG tablet Take 1 tablet by mouth 2 (Two) Times a Day As Needed for Pain.      Xarelto 15 MG tablet TAKE 1 TABLET EVERY DAY 90 tablet 2    [DISCONTINUED] Calcium Carbonate-Vitamin D (CALCIUM + D PO) Take 1 tablet by mouth Daily. PT HOLDING FOR SURGERY      [DISCONTINUED] diclofenac (VOLTAREN) 75 MG EC tablet TAKE 1 TABLET EVERY DAY (Patient taking differently: Take 1 tablet by mouth Every Morning. PT HOLDING FOR SURGERY) 90 tablet 1     No current facility-administered medications on file prior to visit.       ALLERGIES  Patient has no known allergies.    HISTORY  Past Medical History:   Diagnosis Date    AC (acromioclavicular) joint bone spurs 10/2021    Arthritis     Atrial fibrillation     Bursitis of both hips     Fatigue     Health care maintenance     History of kidney stones      Hyperlipidemia     Hypertension     Left foot pain     Lower back pain     Obesity     TODD (obstructive sleep apnea) 08/04/2020    Home sleep study.  Weight 209 pounds.  At least mild TODD with AHI of 13.8 events per hour.  Low O2 saturation 69% and sleep-related hypoxia present for 234 minutes.  The patient snored 18% of the total monitoring time.    SOB (shortness of breath) on exertion     TSH elevation     Ventricular hypertrophy        Social History     Socioeconomic History    Marital status:    Tobacco Use    Smoking status: Former    Smokeless tobacco: Never    Tobacco comments:     no caffeine use.  QUIT AGE 34   Vaping Use    Vaping Use: Never used   Substance and Sexual Activity    Alcohol use: Yes     Comment: VERY SELDOM    Drug use: Never    Sexual activity: Defer       Family History   Problem Relation Age of Onset    Lung cancer Mother 75    No Known Problems Father     Ovarian cancer Sister         60's    Heart attack Sister     Heart disease Sister     Heart disease Brother         open heart surgery     Breast cancer Maternal Aunt         70's    No Known Problems Maternal Grandmother     No Known Problems Maternal Grandfather     No Known Problems Paternal Grandmother     No Known Problems Paternal Grandfather     Malig Hyperthermia Neg Hx        Review of Systems   Constitutional:  Positive for fatigue. Negative for chills, diaphoresis and fever.   HENT:  Positive for congestion.    Eyes: Negative.  Negative for visual disturbance.   Respiratory:  Positive for shortness of breath (occas. with exertion). Negative for apnea, cough, chest tightness and wheezing.    Cardiovascular:  Positive for palpitations (has A-fib) and leg swelling (RT leg worse than LT). Negative for chest pain.   Gastrointestinal: Negative.  Negative for blood in stool.   Endocrine: Negative.  Negative for cold intolerance and heat intolerance.   Genitourinary: Negative.  Negative for hematuria.   Musculoskeletal:   "Positive for arthralgias, back pain, gait problem (uses cane) and myalgias (occas. in legs). Negative for neck pain and neck stiffness.   Skin:  Negative for color change, rash and wound.        Bruises easily    Allergic/Immunologic: Positive for environmental allergies (mold). Negative for food allergies.   Neurological:  Positive for dizziness (if on feet for long periods of time), syncope (near syncope when on feet for long periods of time), light-headedness and headaches (mild headaches occas.). Negative for weakness and numbness.        Gets off balance   Hematological:  Bruises/bleeds easily.   Psychiatric/Behavioral:  Positive for sleep disturbance (due to congestion).        Objective     VITALS: /83 (BP Location: Right arm, Patient Position: Sitting)   Pulse 105   Ht 168.9 cm (66.5\")   Wt 87.4 kg (192 lb 9.6 oz)   SpO2 95%   BMI 30.62 kg/m²     LABS:   Lab Results (most recent)       None            IMAGING:   No Images in the past 120 days found..    EXAM:  Physical Exam  Constitutional:       Appearance: Normal appearance.   Eyes:      Pupils: Pupils are equal, round, and reactive to light.   Cardiovascular:      Rate and Rhythm: Normal rate. Rhythm irregular.      Pulses:           Carotid pulses are 2+ on the right side and 2+ on the left side.       Radial pulses are 2+ on the right side and 2+ on the left side.        Dorsalis pedis pulses are 2+ on the right side and 2+ on the left side.        Posterior tibial pulses are 2+ on the right side and 2+ on the left side.      Heart sounds: Murmur heard.      Gallop: 1-2/6 systolic murmur.   Pulmonary:      Effort: Pulmonary effort is normal.      Breath sounds: Normal breath sounds.   Abdominal:      General: Bowel sounds are normal.      Palpations: Abdomen is soft.   Musculoskeletal:      Right lower leg: Edema present.      Left lower leg: Edema present.   Skin:     General: Skin is warm and dry.      Capillary Refill: Capillary refill " takes less than 2 seconds.   Neurological:      General: No focal deficit present.      Mental Status: She is alert and oriented to person, place, and time.   Psychiatric:         Mood and Affect: Mood normal.         Thought Content: Thought content normal.         Procedure     ECG 12 Lead    Date/Time: 1/9/2024 1:40 PM  Performed by: Lolly Bernardo APRN    Authorized by: Lolly Bernardo APRN  Comparison: compared with previous ECG from 5/18/2022  Rhythm: atrial fibrillation  Rate: normal  BPM: 98  Conduction: conduction normal  ST Segments: ST segments normal  T Waves: T waves normal  QRS axis: left  Comments: Qtc 418ms             Assessment & Plan    Diagnosis Plan   1. Bilateral lower extremity edema        2. Longstanding persistent atrial fibrillation  Adult Transthoracic Echo Complete W/ Cont if Necessary Per Protocol      3. Hyperlipidemia, unspecified hyperlipidemia type        4. Primary hypertension          1.  Bilateral lower extremity edema: We will schedule the patient for an echocardiogram to reevaluate LV function and structural anatomy.  Will plan to see the patient back after testing to review results.  We did discuss amlodipine.  The patient states she has been on this for years and has had no recent change in dosing.  I did inform the patient that this can cause edema.  Will proceed with echocardiogram but if we do not find a reason which would explain her edema we may consider stopping amlodipine and changing to another antihypertensive.  We may also consider changing hydrochlorothiazide to Lasix.  At her follow-up visit we will discuss possible bilateral lower extremity venous ultrasound to evaluate for possible venous insufficiency depending on her echocardiogram findings.  2.  Persistent atrial fibrillation: The patient has had several cardioversions which has been unsuccessful.  She is on Xarelto for long-term anticoagulation and is tolerating this well.  Her heart rate stays  under good control.  She has discussed ablation in the past with her previous cardiology and we did briefly discuss this today.  The patient does not wish to pursue this.  3.  Hyperlipidemia: Will continue statin.  Most recent lipid panel sent from primary care shows good control.  Will periodically review lipid panel.  4.  Primary hypertension: Blood pressure currently under good control.  Will continue current medications including benazepril, amlodipine, hydrochlorothiazide.  The patient was instructed to monitor BP at home and to notify our office if systolic BP is consistently greater than 130-135 systolic.  Goal BP is 130-135/70-80.  Return in about 2 months (around 3/9/2024).    Patient brought in medicine list to appointment, it's been reviewed with patient and med list was updated in the chart.     Advance Care Planning   ACP discussion was declined by the patient. Patient does not have an advance directive, declines further assistance.          MEDS ORDERED DURING VISIT:  No orders of the defined types were placed in this encounter.      DISCONTINUED MEDS DURING VISIT:   Medications Discontinued During This Encounter   Medication Reason    Calcium Carbonate-Vitamin D (CALCIUM + D PO) Patient Reported Not Taking    diclofenac (VOLTAREN) 75 MG EC tablet Discontinued by another clinician          This document has been electronically signed by ELIEL Brooke  January 9, 2024 16:35 EST    Dictated Utilizing Dragon Dictation: Part of this note may be an electronic transcription/translation of spoken language to printed text using the Dragon Dictation System

## 2024-02-06 ENCOUNTER — HOSPITAL ENCOUNTER (OUTPATIENT)
Dept: CARDIOLOGY | Facility: HOSPITAL | Age: 79
Discharge: HOME | End: 2024-02-06
Payer: MEDICARE

## 2024-02-06 VITALS — HEIGHT: 67 IN | BODY MASS INDEX: 30.24 KG/M2 | WEIGHT: 192.68 LBS

## 2024-02-06 DIAGNOSIS — I48.11 LONGSTANDING PERSISTENT ATRIAL FIBRILLATION: ICD-10-CM

## 2024-02-06 PROCEDURE — 93306 TTE W/DOPPLER COMPLETE: CPT

## 2024-02-11 LAB
AORTIC DIMENSIONLESS INDEX: 0.86 (DI)
BH CV ECHO MEAS - ACS: 1.8 CM
BH CV ECHO MEAS - AO MAX PG: 4.8 MMHG
BH CV ECHO MEAS - AO MEAN PG: 3.1 MMHG
BH CV ECHO MEAS - AO ROOT DIAM: 3.2 CM
BH CV ECHO MEAS - AO V2 MAX: 109.2 CM/SEC
BH CV ECHO MEAS - AO V2 VTI: 20.5 CM
BH CV ECHO MEAS - EDV(CUBED): 92.2 ML
BH CV ECHO MEAS - EF(MOD-BP): 53 %
BH CV ECHO MEAS - ESV(CUBED): 35.9 ML
BH CV ECHO MEAS - FS: 27 %
BH CV ECHO MEAS - IVS/LVPW: 1 CM
BH CV ECHO MEAS - IVSD: 1.21 CM
BH CV ECHO MEAS - LA A2CS (ATRIAL LENGTH): 6.8 CM
BH CV ECHO MEAS - LA A4C LENGTH: 7.1 CM
BH CV ECHO MEAS - LA DIMENSION: 4.2 CM
BH CV ECHO MEAS - LAT PEAK E' VEL: 10.2 CM/SEC
BH CV ECHO MEAS - LV MASS(C)D: 202.4 GRAMS
BH CV ECHO MEAS - LV MAX PG: 3.2 MMHG
BH CV ECHO MEAS - LV MEAN PG: 1.68 MMHG
BH CV ECHO MEAS - LV V1 MAX: 90 CM/SEC
BH CV ECHO MEAS - LV V1 VTI: 17.8 CM
BH CV ECHO MEAS - LVIDD: 4.5 CM
BH CV ECHO MEAS - LVIDS: 3.3 CM
BH CV ECHO MEAS - LVPWD: 1.21 CM
BH CV ECHO MEAS - MED PEAK E' VEL: 9.9 CM/SEC
BH CV ECHO MEAS - MV DEC SLOPE: 790 CM/SEC2
BH CV ECHO MEAS - MV DEC TIME: 0.18 SEC
BH CV ECHO MEAS - MV E MAX VEL: 111 CM/SEC
BH CV ECHO MEAS - MV MAX PG: 6.1 MMHG
BH CV ECHO MEAS - MV MEAN PG: 2.7 MMHG
BH CV ECHO MEAS - MV P1/2T: 48.7 MSEC
BH CV ECHO MEAS - MV V2 VTI: 20.7 CM
BH CV ECHO MEAS - MVA(P1/2T): 4.5 CM2
BH CV ECHO MEAS - PA V2 MAX: 89.8 CM/SEC
BH CV ECHO MEAS - RAP SYSTOLE: 8 MMHG
BH CV ECHO MEAS - RV MAX PG: 2.6 MMHG
BH CV ECHO MEAS - RV V1 MAX: 81.1 CM/SEC
BH CV ECHO MEAS - RV V1 VTI: 14.1 CM
BH CV ECHO MEAS - RVDD: 2.7 CM
BH CV ECHO MEAS - RVSP: 32.7 MMHG
BH CV ECHO MEAS - TR MAX PG: 24.7 MMHG
BH CV ECHO MEAS - TR MAX VEL: 248.3 CM/SEC
BH CV ECHO MEASUREMENTS AVERAGE E/E' RATIO: 11.04
BH CV XLRA - TDI S': 11.6 CM/SEC
LEFT ATRIUM VOLUME INDEX: 32.9 ML/M2
LEFT ATRIUM VOLUME: 65 ML
SINUS: 3 CM

## 2024-02-12 ENCOUNTER — TELEPHONE (OUTPATIENT)
Dept: CARDIOLOGY | Facility: CLINIC | Age: 79
End: 2024-02-12
Payer: MEDICARE

## 2024-02-12 RX ORDER — HYDROCHLOROTHIAZIDE 25 MG/1
25 TABLET ORAL DAILY
Qty: 90 TABLET | Refills: 3 | Status: SHIPPED | OUTPATIENT
Start: 2024-02-12

## 2024-02-21 ENCOUNTER — TELEPHONE (OUTPATIENT)
Dept: CARDIOLOGY | Facility: CLINIC | Age: 79
End: 2024-02-21
Payer: MEDICARE

## 2024-02-21 RX ORDER — AMLODIPINE BESYLATE 10 MG/1
10 TABLET ORAL DAILY
Qty: 90 TABLET | Refills: 3 | Status: SHIPPED | OUTPATIENT
Start: 2024-02-21

## 2024-02-21 RX ORDER — ATORVASTATIN CALCIUM 20 MG/1
20 TABLET, FILM COATED ORAL DAILY
Qty: 90 TABLET | Refills: 3 | Status: SHIPPED | OUTPATIENT
Start: 2024-02-21

## 2024-02-21 NOTE — TELEPHONE ENCOUNTER
Caller: Denise Staley    Relationship: Self    Best call back number: [phone]    Requested Prescriptions:   Requested Prescriptions     Pending Prescriptions Disp Refills    amLODIPine (NORVASC) 10 MG tablet 90 tablet 3     Sig: Take 1 tablet by mouth Daily.    atorvastatin (LIPITOR) 20 MG tablet 90 tablet 3     Sig: Take 1 tablet by mouth Daily.        Pharmacy where request should be sent: [address] [phone] [place] [phone]      Last office visit with prescribing clinician: 1/9/2024   Last telemedicine visit with prescribing clinician: Visit date not found   Next office visit with prescribing clinician: 3/14/2024       Does the patient have less than a 3 day supply:  [] Yes  [x] No    Would you like a call back once the refill request has been completed: [x] Yes [] No    If the office needs to give you a call back, can they leave a voicemail: [x] Yes [] No    Corey Alejandro Rep   02/21/24 10:28 EST          none

## 2024-03-11 ENCOUNTER — TELEPHONE (OUTPATIENT)
Dept: CARDIOLOGY | Facility: CLINIC | Age: 79
End: 2024-03-11
Payer: MEDICARE

## 2024-03-11 NOTE — TELEPHONE ENCOUNTER
Ofelia from Dr. Avalos's office called to verify why the pt has an appt later these week due to their being some confusion about the pt's appts with her daughter.    Called their office and notified [facility] since Ofelia wasn't available.

## 2024-03-12 ENCOUNTER — TELEPHONE (OUTPATIENT)
Dept: CARDIOLOGY | Facility: CLINIC | Age: 79
End: 2024-03-12
Payer: MEDICARE

## 2024-03-12 NOTE — TELEPHONE ENCOUNTER
Echocardiogram did show evidence of grade 2 diastolic dysfunction.  At her last visit she had edema and this is why we ordered the echo.  Was going to discuss results and evaluate edema at her visit.  Will likely recommend some medication changes if her edema has not improved.  I would like to have hospital records regarding her recent CVA to review as well.

## 2024-03-12 NOTE — TELEPHONE ENCOUNTER
Pt's daughter Bridget who is on the [employer]IP[employer] called reporting the pt had a CVA on 2/24/24 and she inquired if it is still beneficial to discuss the Echo that was performed earlier in February or if the pt needs to have it repeated.

## 2024-03-14 ENCOUNTER — OFFICE VISIT (OUTPATIENT)
Dept: CARDIOLOGY | Facility: CLINIC | Age: 79
End: 2024-03-14
Payer: MEDICARE

## 2024-03-14 VITALS
OXYGEN SATURATION: 100 % | SYSTOLIC BLOOD PRESSURE: 104 MMHG | WEIGHT: 179.2 LBS | HEART RATE: 60 BPM | DIASTOLIC BLOOD PRESSURE: 64 MMHG | BODY MASS INDEX: 28.46 KG/M2

## 2024-03-14 DIAGNOSIS — I48.11 LONGSTANDING PERSISTENT ATRIAL FIBRILLATION: ICD-10-CM

## 2024-03-14 DIAGNOSIS — I63.9 ISCHEMIC STROKE: Primary | ICD-10-CM

## 2024-03-14 DIAGNOSIS — I10 ESSENTIAL HYPERTENSION: ICD-10-CM

## 2024-03-14 DIAGNOSIS — E78.5 HYPERLIPIDEMIA, UNSPECIFIED HYPERLIPIDEMIA TYPE: ICD-10-CM

## 2024-03-14 DIAGNOSIS — I63.89 OTHER CEREBRAL INFARCTION: ICD-10-CM

## 2024-03-14 DIAGNOSIS — I50.32 CHRONIC HEART FAILURE WITH PRESERVED EJECTION FRACTION (HFPEF): ICD-10-CM

## 2024-03-14 RX ORDER — ASPIRIN 81 MG/1
81 TABLET ORAL DAILY
COMMUNITY

## 2024-03-14 RX ORDER — CETIRIZINE HYDROCHLORIDE 10 MG/1
10 TABLET ORAL DAILY
COMMUNITY

## 2024-03-14 RX ORDER — AMMONIUM LACTATE 120 MG/G
1 CREAM TOPICAL DAILY
COMMUNITY

## 2024-03-14 RX ORDER — SENNOSIDES 8.6 MG
650 CAPSULE ORAL NIGHTLY
COMMUNITY

## 2024-03-14 RX ORDER — OMEPRAZOLE 40 MG/1
40 CAPSULE, DELAYED RELEASE ORAL DAILY
COMMUNITY

## 2024-03-14 RX ORDER — CHOLECALCIFEROL (VITAMIN D3) 125 MCG
5 CAPSULE ORAL
COMMUNITY

## 2024-03-14 NOTE — PROGRESS NOTES
Subjective     Denise Staley is a 78 y.o. female who presents today for echo follow up .    CHIEF COMPLIANT  Chief Complaint   Patient presents with    echo follow up        Active Problems:  1.  Persistent atrial fibrillation, on Xarelto.  Status post multiple failed ablations.  2.  Essential hypertension  3.  Dyslipidemia  4.  Obstructive sleep apnea  5.  Echocardiogram 2021 with EF 66 to 70%  5.1 echocardiogram 2/6/2024: EF 60 to 65%, grade 2 diastolic dysfunction.  Moderate to severe biatrial enlargement.  RV is dilated but RV systolic function appears grossly preserved.  No septal defect, mass or thrombus.  Mild MR.  PA pressure in the low 30s.  6.  Significant osteoarthritis   7.  MRI of the brain 2/28/2024: Recent right frontal cortical ischemic infarct.  No acute hemorrhage significant mass affect or shift.  Probable microvascular change in the white matter      HPI  The patient is a 78-year-old female that returns for follow-up to discuss recent echocardiogram.  The patient underwent an echocardiogram on 2/6/2024 which showed a preserved EF of 60 to 65%, evidence of grade 2 diastolic dysfunction.  Since her last visit she has unfortunately had a stroke.  She became confused and had some altered thought processes.  She called her sister who recognized there was a problem.  She was taken for evaluation and a CT did show a low-density area in the right frontal, parietal and temporal region most likely represent an ischemia.  An MRI was recommended and showed a recent right frontal cortical ischemic infarct.  The patient does have persistent atrial fibrillation and has been on Xarelto for long-term anticoagulation for multiple years.  The patient states that a few years ago her cardiologist in [place] did decrease her dose to 15 mg daily.  She was getting an injection in her back and was supposed to hold her Xarelto for 3 days.  She was confused about these instructions and ended up only missing 1 dose  of Xarelto.  This was several days before the stroke occurred.  The patient continues to have some altered thought processes and issues with memory..  She is having issues doing things like balancing a checkbook or working [employer] puzzles.  She has been referred to neurology in [place] by her primary care and is waiting for this appointment.  She has no other residual effects from the stroke.  Her daughter and her sister do accompany her to this visit today.  Her sister lives in [place] and the patient's daughter lives in [place].  The patient denies chest pain.  She does have some exertional shortness of air but feels this is stable and unchanged from what she has had for the past few years.  She will have some intermittent lower extremity edema which resolves with compression socks.      PRIOR MEDS  Current Outpatient Medications on File Prior to Visit   Medication Sig Dispense Refill    acetaminophen (TYLENOL) 650 MG 8 hr tablet Take 1 tablet by mouth Every Night.      acetaminophen (TYLENOL) 650 MG 8 hr tablet Take 1 tablet by mouth Every Night.      amLODIPine (NORVASC) 10 MG tablet Take 1 tablet by mouth Daily. 90 tablet 3    ammonium lactate (AMLACTIN) 12 % cream Apply 1 g topically to the appropriate area as directed Daily.      aspirin 81 MG EC tablet Take 1 tablet by mouth Daily.      atorvastatin (LIPITOR) 20 MG tablet Take 1 tablet by mouth Daily. (Patient taking differently: Take 4 tablets by mouth Daily.) 90 tablet 3    benazepril (LOTENSIN) 40 MG tablet TAKE 1 TABLET EVERY DAY 90 tablet 3    cetirizine (zyrTEC) 10 MG tablet Take 1 tablet by mouth Daily.      Cholecalciferol (VITAMIN D) 1000 UNITS tablet Take 1 tablet by mouth 2 (Two) Times a Day.      docusate sodium (COLACE) 50 MG capsule Take 1 capsule by mouth Daily As Needed for Constipation.      fluticasone (FLONASE) 50 MCG/ACT nasal spray 2 sprays into the nostril(s) as directed by provider Every Night.      FOLIC ACID PO Take 400 mg by  mouth Daily.      hydroCHLOROthiazide 25 MG tablet Take 1 tablet by mouth Daily. 90 tablet 3    melatonin 5 MG tablet tablet Take 1 tablet by mouth.      multivitamin (THERAGRAN) tablet tablet Take 1 tablet by mouth Daily. PT HOLDING FOR SURGERY   Centrum Silver      omeprazole (priLOSEC) 40 MG capsule Take 1 capsule by mouth Daily.      Polyvinyl Alcohol-Povidone PF (HYPOTEARS) 1.4-0.6 % ophthalmic solution Administer 1-2 drops to both eyes 2 (Two) Times a Day.      traMADol (ULTRAM) 50 MG tablet Take 1 tablet by mouth 2 (Two) Times a Day As Needed for Pain.      Loratadine 10 MG capsule Take 1 capsule by mouth Every Night.       No current facility-administered medications on file prior to visit.       ALLERGIES  Patient has no known allergies.    HISTORY  Past Medical History:   Diagnosis Date    AC (acromioclavicular) joint bone spurs 10/2021    Arthritis     Atrial fibrillation     Bursitis of both hips     Fatigue     Health care maintenance     History of kidney stones     Hyperlipidemia     Hypertension     Left foot pain     Lower back pain     Obesity     TODD (obstructive sleep apnea) 08/04/2020    Home sleep study.  Weight 209 pounds.  At least mild TODD with AHI of 13.8 events per hour.  Low O2 saturation 69% and sleep-related hypoxia present for 234 minutes.  The patient snored 18% of the total monitoring time.    SOB (shortness of breath) on exertion     TSH elevation     Ventricular hypertrophy        Social History     Socioeconomic History    Marital status:    Tobacco Use    Smoking status: Former    Smokeless tobacco: Never    Tobacco comments:     no caffeine use.  QUIT AGE 34   Vaping Use    Vaping status: Never Used   Substance and Sexual Activity    Alcohol use: Yes     Comment: VERY SELDOM    Drug use: Never    Sexual activity: Defer       Family History   Problem Relation Age of Onset    Lung cancer Mother 75    No Known Problems Father     Ovarian cancer Sister         60's    Heart  attack Sister     Heart disease Sister     Heart disease Brother         open heart surgery     Breast cancer Maternal Aunt         70's    No Known Problems Maternal Grandmother     No Known Problems Maternal Grandfather     No Known Problems Paternal Grandmother     No Known Problems Paternal Grandfather     Malig Hyperthermia Neg Hx        Review of Systems   Constitutional:  Positive for chills. Negative for fatigue and fever.   HENT:  Negative for congestion, rhinorrhea and sore throat.    Eyes:  Negative for visual disturbance.   Respiratory:  Positive for apnea and shortness of breath (with exertion). Negative for chest tightness.    Cardiovascular:  Negative for chest pain, palpitations (does not feel palps) and leg swelling (is using compression socks and is doing much better).   Gastrointestinal:  Positive for constipation. Negative for diarrhea and nausea.   Musculoskeletal:  Positive for arthralgias, back pain (having back pain so severe that if she does much she will almost black out) and myalgias. Negative for neck pain.   Allergic/Immunologic: Positive for environmental allergies. Negative for food allergies.   Neurological:  Positive for syncope (having near black out with her back) and weakness. Negative for dizziness and light-headedness.   Hematological:  Bruises/bleeds easily.   Psychiatric/Behavioral:  Negative for sleep disturbance.        Objective     VITALS: /64 (BP Location: Right arm, Patient Position: Sitting, Cuff Size: Adult)   Pulse 60   Wt 81.3 kg (179 lb 3.2 oz)   SpO2 100%   BMI 28.46 kg/m²     LABS:   Lab Results (most recent)       None            IMAGING:   No Images in the past 120 days found..    EXAM:  Physical Exam  Constitutional:       Appearance: Normal appearance.   Eyes:      Pupils: Pupils are equal, round, and reactive to light.   Cardiovascular:      Rate and Rhythm: Normal rate. Rhythm irregular.      Pulses:           Carotid pulses are 2+ on the right  side and 2+ on the left side.       Radial pulses are 2+ on the right side and 2+ on the left side.        Dorsalis pedis pulses are 2+ on the right side and 2+ on the left side.        Posterior tibial pulses are 2+ on the right side and 2+ on the left side.      Heart sounds: Murmur heard.      Gallop: 1-2/6 systolic murmur.   Pulmonary:      Effort: Pulmonary effort is normal.      Breath sounds: Normal breath sounds.   Abdominal:      General: Bowel sounds are normal.      Palpations: Abdomen is soft.   Musculoskeletal:      Right lower leg: very mild Edema present.      Left lower leg: very mild Edema present.   Skin:     General: Skin is warm and dry.      Capillary Refill: Capillary refill takes less than 2 seconds.   Neurological:      General: No focal deficit present.      Mental Status: She is alert and oriented to person, place, and time.   Psychiatric:         Mood and Affect: Mood normal.           Procedure   Procedures       Assessment & Plan    Diagnosis Plan   1. Ischemic stroke  Adult Transesophageal Echo (RYNE) W/ Cont if Necessary Per Protocol    Duplex Carotid Ultrasound CAR      2. Longstanding persistent atrial fibrillation  Adult Transesophageal Echo (RYNE) W/ Cont if Necessary Per Protocol      3. Essential hypertension        4. Hyperlipidemia, unspecified hyperlipidemia type        5. Chronic heart failure with preserved ejection fraction (HFpEF)        6. Other cerebral infarction  Duplex Carotid Ultrasound CAR        Plan:  1.  Ischemic stroke: MRI does confirm recent right frontal cortical ischemic infarct.  Patient had missed only 1 dose of Xarelto and was taking the 15 mg daily dose.  Would like to change her to Eliquis 5 mg p.o. twice daily for anticoagulation moving forward.  We did discuss potentially doing a RYNE.  As she is already anticoagulated and the cause of the stroke is likely A-fib the clinical benefit of this may not be as significant.  The patient and her daughter are  going to do talk about this over the weekend and if they decide they would like to proceed with a RYNE they will call the office back on Monday.  Continue aspirin and statin.  Will check a carotid Doppler.    2.  Longstanding persistent atrial fibrillation: Will change the patient to Eliquis 5 mg p.o. twice daily for long-term anticoagulation.  No current signs or symptoms of bleeding.  Continue statin.  3.  Essential hypertension: Blood pressure under good control with current medication regimen.  The patient was instructed to monitor BP at home and to notify our office if systolic BP is consistently greater than 130-135 systolic.  Goal BP is 130-135/70-80.  4.  Hyperlipidemia: Continue statin.  5.  HFpEF: We will continue current diuretic.  We did discuss adding an SGLT2 inhibitor but the patient prefers to wait on this at this time as her symptoms are stable.       Return for f/u after RYNE and carotid doppler .    Denise NIEVES Staley  reports that she has quit smoking. She has never used smokeless tobacco. I have educated her on the risk of diseases from using tobacco products. Patient does not smoke.         BMI is >= 30 and <35. (Class 1 Obesity). The following options were offered after discussion;: exercise counseling/recommendations and nutrition counseling/recommendations           MEDS ORDERED DURING VISIT:  New Medications Ordered This Visit   Medications    rivaroxaban (XARELTO) 20 MG tablet     Sig: Take 1 tablet by mouth Daily.     Dispense:  90 tablet     Refill:  3       DISCONTINUED MEDS DURING VISIT:   Medications Discontinued During This Encounter   Medication Reason    rivaroxaban (Xarelto) 15 MG tablet           This document has been electronically signed by ELIEL Brooke  March 15, 2024 08:50 EDT    Dictated Utilizing Dragon Dictation: Part of this note may be an electronic transcription/translation of spoken language to printed text using the Dragon Dictation System

## 2024-04-03 ENCOUNTER — TELEPHONE (OUTPATIENT)
Dept: CARDIOLOGY | Facility: CLINIC | Age: 79
End: 2024-04-03
Payer: MEDICARE

## 2024-04-03 NOTE — TELEPHONE ENCOUNTER
I went over Carotid results with patient daughter Parisa who is listed on verbal release. No significant stenosis. She is wanting to see if apt could be sooner that scheduled it was pushed out because she was going have a RYNE but is not having this now. She sent a message through my chart about this.

## 2024-04-03 NOTE — TELEPHONE ENCOUNTER
"Relay     \"No significant stenosis\"      Left VM to return call regarding results.    Faxed results to pcp         ----- Message from ELIEL Wells sent at 4/2/2024  7:34 AM EDT -----  Regarding: FW:  No significant stenosis  ----- Message -----  From: Interface, Scans Incoming  Sent: 4/1/2024   4:45 PM CDT  To: ELIEL Wells      Duplex Carotid Ultrasound CAR  "

## 2024-05-02 ENCOUNTER — OFFICE VISIT (OUTPATIENT)
Dept: NEUROLOGY | Facility: CLINIC | Age: 79
End: 2024-05-02
Payer: MEDICARE

## 2024-05-02 VITALS
HEIGHT: 67 IN | DIASTOLIC BLOOD PRESSURE: 70 MMHG | OXYGEN SATURATION: 98 % | HEART RATE: 87 BPM | BODY MASS INDEX: 28.09 KG/M2 | WEIGHT: 179 LBS | SYSTOLIC BLOOD PRESSURE: 118 MMHG

## 2024-05-02 DIAGNOSIS — Z86.73 HISTORY OF STROKE: Primary | ICD-10-CM

## 2024-05-02 DIAGNOSIS — G31.84 MILD COGNITIVE IMPAIRMENT: ICD-10-CM

## 2024-05-02 DIAGNOSIS — F44.89 CONFUSION STATE: ICD-10-CM

## 2024-05-02 NOTE — PROGRESS NOTES
Chief Complaint  Stroke (Here with daughter bradley, Stroke happened in February- did not go to ED)    Subjective          Denise Staley presents to [facility] for   HISTORY OF PRESENT ILLNESS:    Denise Staley is a 78 year old right handed woman who presents with her daughter, Bradley, to neurology clinic for initial evaluation and treatment of stroke.  Of note she has history of Atrial fib, HLD and HTN and is currently on Eliquis and was previously on Xarelto.  I reviewed her referring clinic note.  She reportedly skipped a dose of Xarelto on 2/22/2024.  She tells me she had been getting shots in her back and would be without Xarelto for up to 3 days.  On 2/23/24 or 2/24/24 she called her sister and not sure what day it was and was distrustful of her sister telling her what day of the week it was and she was having a hard time working her phone.  She also was having difficulty working crossword puzzle or [employer].  Her daughter reports patient was upset and shaky and concerned with her difficulty with using her phone.  She reports not experiencing any weakness, numbness, no changes in vision but they do think that patient maybe speaking slower since her stroke.  She had a head CT scan and brain MRI scan at outside facility which was read as demonstrating a recent right frontal cortical stroke.  She had carotid doppler testing which did not demonstrate any significant stenosis.  She has had some sessions of physical and speech therapy which she did not think were necessary or helpful.  She is not currently driving and tells me she would like to drive.  She does have some lapses in time and at times she does not have problem with using her phone but other times she has a hard time.  There is family history of dementia in her sister and grandfather had (hardening of the arteries).  Her Xarelto was switched to Eliquis following her stroke.  She has not had any new stroke like  symptoms.  She is also on atorvastatin 80 mg daily.  She is having some trouble with her memory since her stroke.  Her BP is well controlled today at 118/70.      Past Medical History:   Diagnosis Date    AC (acromioclavicular) joint bone spurs 10/2021    Arthritis     Atrial fibrillation     Bursitis of both hips     Fatigue     Health care maintenance     History of kidney stones     Hyperlipidemia     Hypertension     Left foot pain     Lower back pain     Obesity     TODD (obstructive sleep apnea) 08/04/2020    Home sleep study.  Weight 209 pounds.  At least mild TODD with AHI of 13.8 events per hour.  Low O2 saturation 69% and sleep-related hypoxia present for 234 minutes.  The patient snored 18% of the total monitoring time.    SOB (shortness of breath) on exertion     TSH elevation     Ventricular hypertrophy         Family History   Problem Relation Age of Onset    Lung cancer Mother 75    No Known Problems Father     Ovarian cancer Sister         60's    Heart attack Sister     Heart disease Sister     Heart disease Brother         open heart surgery     Breast cancer Maternal Aunt         70's    No Known Problems Maternal Grandmother     No Known Problems Maternal Grandfather     No Known Problems Paternal Grandmother     No Known Problems Paternal Grandfather     Malig Hyperthermia Neg Hx         Social History     Socioeconomic History    Marital status:    Tobacco Use    Smoking status: Former    Smokeless tobacco: Never    Tobacco comments:     no caffeine use.  QUIT AGE 34   Vaping Use    Vaping status: Never Used   Substance and Sexual Activity    Alcohol use: Yes     Comment: VERY SELDOM    Drug use: Never    Sexual activity: Defer        I have reviewed and confirmed the accuracy of the ROS as documented by the MA/LPN/RN Janeth Castro MD   Review of Systems   Constitutional:  Positive for activity change and appetite change (eating less).   HENT:  Negative for trouble swallowing and voice  "change.    Eyes:  Negative for blurred vision, double vision and pain.   Gastrointestinal:  Negative for nausea and vomiting.   Musculoskeletal:  Positive for back pain and gait problem.   Allergic/Immunologic: Positive for environmental allergies. Negative for food allergies.   Neurological:  Positive for weakness, memory problem (reports cognition problems) and confusion. Negative for dizziness, tremors, seizures, syncope, facial asymmetry, speech difficulty, light-headedness, numbness and headache.   Psychiatric/Behavioral:  Positive for agitation and behavioral problems. Negative for decreased concentration, dysphoric mood, hallucinations, self-injury, sleep disturbance, suicidal ideas, negative for hyperactivity, depressed mood and stress. The patient is not nervous/anxious.         Objective   Vital Signs:   /70   Pulse 87   Ht 169 cm (66.54\")   Wt 81.2 kg (179 lb)   SpO2 98%   BMI 28.43 kg/m²       PHYSICAL EXAM:    General   Mental Status - Alert. General Appearance - Well developed, Well groomed, Oriented and Cooperative. Orientation - Oriented X3.       Head and Neck  Head - normocephalic, atraumatic with no lesions or palpable masses.  Neck    Global Assessment - supple.       Eye   Sclera/Conjunctiva - Bilateral - Normal.    ENMT  Mouth and Throat   Oral Cavity/Oropharynx: Oropharynx - the soft palate,uvula and tongue are normal in appearance.    Chest and Lung Exam   Chest - lung clear to auscultation bilaterally.    Cardiovascular   Cardiovascular examination reveals  - irregular, irregular.    Neurologic   Mental Status: Speech - Normal. Cognitive function - SLUMS of 25/30 with 4/5 object recall, appropriate fund of knowledge. No impairment of attention, Impairment of concentration, impairment of long term memory with some impairment of short term memory.  Cranial Nerves:   II Optic: Visual acuity - Left - Normal. Right - Normal. Visual fields - Normal (to confrontation).  III Oculomotor: " Pupillary constriction - Left - Normal. Right - Normal.  VII Facial: - Normal Bilaterally.   IX Glossopharyngeal / X Vagus - Normal.  XI Accessory: Trapezius - Bilateral - Normal. Sternocleidomastoid - Bilateral - Normal.  XII Hypoglossal - Bilateral - Normal.  Eye Movements: - Normal Bilaterally.  Sensory:   Light Touch: Intact - Globally.  Motor:   Bulk and Contour: - Normal.  Tone: - Normal.  Tremor: Not present.  Strength: 5/5 normal muscle strength - All Muscles.   General Assessment of Reflexes: - deep tendon reflexes are normal. Coordination - No Impairment of finger-to-nose or Impairment of rapid alternating movements. Gait - Broad based, uses rai.        Result Review :                 Assessment and Plan    Problem List Items Addressed This Visit          Mental Health    Confusion state    Current Assessment & Plan     I will refer her to have an EEG for further evaluation and to be sure she has not at risk for seizures given that stroke can place her at increased risk for seizure.           Relevant Orders    EEG EXTENDED MONITORING  MIN       Neuro    History of stroke - Primary    Current Assessment & Plan     78 year old right handed woman with recent stroke.  Of note she has history of Atrial fib, HLD and HTN and is currently on Eliquis and was previously on Xarelto.  I reviewed her referring clinic note.  She reportedly skipped a dose of Xarelto on 2/22/2024.  She tells me she had been getting shots in her back and would be without Xarelto for up to 3 days.  On 2/23/24 or 2/24/24 she called her sister and not sure what day it was and was distrustful of her sister telling her what day of the week it was and she was having a hard time working her phone.  She also was having difficulty working crossword puzzle or [employer].  Her daughter reports patient was upset and shaky and concerned with her difficulty with using her phone.  She reports not experiencing any weakness, numbness, no changes in  vision but they do think that patient maybe speaking slower since her stroke.  She had a head CT scan and brain MRI scan at outside facility which was read as demonstrating a recent right frontal cortical stroke.  She had carotid doppler testing which did not demonstrate any significant stenosis.  She has had some sessions of physical and speech therapy which she did not think were necessary or helpful.  She is not currently driving and tells me she would like to drive.  She does have some lapses in time and at times she does not have problem with using her phone but other times she has a hard time.  There is family history of dementia in her sister and grandfather had (hardening of the arteries).  Her Xarelto was switched to Eliquis following her stroke and she is also on a baby aspirin.  She has not had any new stroke like symptoms.  She is also on atorvastatin 80 mg daily.  She is having some trouble with her memory since her stroke.  Her BP is well controlled today at 118/70.  She does not smoke.  Her SLUMS of 25/30 is not consistent with dementia on visit today.  I will refer her to have formal community mobility assessment performed for further evaluation for driving.  I spoke with them about stroke symptoms.  She has POA but does not have living will.  I agree with continuing the Eliquis and high dose atorvastatin.           Relevant Orders    EEG EXTENDED MONITORING  MIN    Mild cognitive impairment    Current Assessment & Plan     SLUMS of 25/30 maybe consistent with MCI.  I spoke with them about safety issues.  I advised her to exercise and socialize.           Relevant Orders    EEG EXTENDED MONITORING  MIN    Ambulatory Referral to Occupational Therapy for Evaluation & Treatment (Completed)     I spent 65 minutes caring for Denise on this date of service. This time includes time spent by me in the following activities:preparing for the visit, reviewing tests, obtaining and/or reviewing a  separately obtained history, performing a medically appropriate examination and/or evaluation , counseling and educating the patient/family/caregiver, ordering medications, tests, or procedures, documenting information in the medical record, and care coordination    Follow Up   Return if symptoms worsen or fail to improve.  Patient was given instructions and counseling regarding her condition or for health maintenance advice. Please see specific information pulled into the AVS if appropriate.

## 2024-05-02 NOTE — ASSESSMENT & PLAN NOTE
I will refer her to have an EEG for further evaluation and to be sure she has not at risk for seizures given that stroke can place her at increased risk for seizure.

## 2024-05-02 NOTE — PATIENT INSTRUCTIONS
Lower blood pressure by restricting salt in diet (less than 2400 mg/day), weight loss, increase fruits, vegetables, whole grains, and low-fat dairy products.    Consider the DASH diet or Mediterranean diet.  Increase fish and poultry intake.    Avoid sodas, sweets, and red meat.    Limit alcohol consumption.    Monitor and keep blood pressure, cholesterol and blood sugar at goal.    Avoid the use of tobacco and avoid secondhand smoke.    Engage in moderate to vigorous intensity aerobic exercise for about 40 minutes 3-4 times per week.  Consider lower intensity lupe chi or yoga if necessary.    Take antiplatelet medication regularly.    If you snore, wake up unrefreshed or feel tired during the day, talk to your doctor as these may be signs of sleep apnea and a sleep study may be indicated.

## 2024-05-02 NOTE — ASSESSMENT & PLAN NOTE
78 year old right handed woman with recent stroke.  Of note she has history of Atrial fib, HLD and HTN and is currently on Eliquis and was previously on Xarelto.  I reviewed her referring clinic note.  She reportedly skipped a dose of Xarelto on 2/22/2024.  She tells me she had been getting shots in her back and would be without Xarelto for up to 3 days.  On 2/23/24 or 2/24/24 she called her sister and not sure what day it was and was distrustful of her sister telling her what day of the week it was and she was having a hard time working her phone.  She also was having difficulty working crossword puzzle or [place].  Her daughter reports patient was upset and shaky and concerned with her difficulty with using her phone.  She reports not experiencing any weakness, numbness, no changes in vision but they do think that patient maybe speaking slower since her stroke.  She had a head CT scan and brain MRI scan at outside facility which was read as demonstrating a recent right frontal cortical stroke.  She had carotid doppler testing which did not demonstrate any significant stenosis.  She has had some sessions of physical and speech therapy which she did not think were necessary or helpful.  She is not currently driving and tells me she would like to drive.  She does have some lapses in time and at times she does not have problem with using her phone but other times she has a hard time.  There is family history of dementia in her sister and grandfather had (hardening of the arteries).  Her Xarelto was switched to Eliquis following her stroke and she is also on a baby aspirin.  She has not had any new stroke like symptoms.  She is also on atorvastatin 80 mg daily.  She is having some trouble with her memory since her stroke.  Her BP is well controlled today at 118/70.  She does not smoke.  Her SLUMS of 25/30 is not consistent with dementia on visit today.  I will refer her to have formal community mobility assessment  performed for further evaluation for driving.  I spoke with them about stroke symptoms.  She has POA but does not have living will.  I agree with continuing the Eliquis and high dose atorvastatin.

## 2024-05-02 NOTE — ASSESSMENT & PLAN NOTE
SLUMS of 25/30 maybe consistent with [facility].  I spoke with them about safety issues.  I advised her to exercise and socialize.

## 2024-05-03 ENCOUNTER — PATIENT ROUNDING (BHMG ONLY) (OUTPATIENT)
Dept: NEUROLOGY | Facility: CLINIC | Age: 79
End: 2024-05-03
Payer: MEDICARE

## 2024-05-21 ENCOUNTER — OFFICE VISIT (OUTPATIENT)
Dept: CARDIOLOGY | Facility: CLINIC | Age: 79
End: 2024-05-21
Payer: MEDICARE

## 2024-05-21 VITALS
OXYGEN SATURATION: 97 % | SYSTOLIC BLOOD PRESSURE: 126 MMHG | WEIGHT: 175 LBS | HEIGHT: 67 IN | DIASTOLIC BLOOD PRESSURE: 65 MMHG | HEART RATE: 83 BPM | BODY MASS INDEX: 27.47 KG/M2

## 2024-05-21 DIAGNOSIS — I50.32 CHRONIC HEART FAILURE WITH PRESERVED EJECTION FRACTION (HFPEF): ICD-10-CM

## 2024-05-21 DIAGNOSIS — E78.5 HYPERLIPIDEMIA, UNSPECIFIED HYPERLIPIDEMIA TYPE: ICD-10-CM

## 2024-05-21 DIAGNOSIS — I10 ESSENTIAL HYPERTENSION: ICD-10-CM

## 2024-05-21 DIAGNOSIS — I63.9 ISCHEMIC STROKE: Primary | ICD-10-CM

## 2024-05-21 DIAGNOSIS — I48.11 LONGSTANDING PERSISTENT ATRIAL FIBRILLATION: ICD-10-CM

## 2024-05-21 NOTE — PROGRESS NOTES
Subjective     Denise Staley is a 78 y.o. female who presents today for Follow-up.    CHIEF COMPLIANT  Chief Complaint   Patient presents with    Follow-up       Active Problems:  1.  Persistent atrial fibrillation, on Eliquis  .  Status post multiple failed ablations.  2.  Essential hypertension  3.  Dyslipidemia  4.  Obstructive sleep apnea  5.  Echocardiogram 2021 with EF 66 to 70%  5.1 echocardiogram 2/6/2024: EF 60 to 65%, grade 2 diastolic dysfunction.  Moderate to severe biatrial enlargement.  RV is dilated but RV systolic function appears grossly preserved.  No septal defect, mass or thrombus.  Mild MR.  PA pressure in the low 30s.  6.  Significant osteoarthritis   7.  MRI of the brain 2/28/2024: Recent right frontal cortical ischemic infarct.  No acute hemorrhage significant mass affect or shift.  Probable microvascular change in the white matter    HPI  The patient is a 78-year-old female that returns for follow-up.  Her last visit she has been evaluated by neurology who did recommend she stop aspirin.  She will continue to take Eliquis and high-dose statin.  She denies further stroke symptoms.  She denies chest pain, worsening shortness of air, syncope.  Did have some dizziness this morning but has since realized that she mixed up her morning and nighttime medications and does believe this attributed to her symptoms.  She continues to have a lot of back pain and is being evaluated for a possible ablation to help control pain.  The patient states that the only time her heart rate will go up is if she is in significant pain.  The patient was accompanied by her daughter and sister to the appointment today.  She does feel that her memory issues have improved a little since her last visit.  She is now able to work her Oct[employer] puzzles.  The patient did have multiple questions about ablation for A-fib.  She has previously underwent multiple failed cardioversions.  She states she was previously on amiodarone  but this was stopped by her previous cardiologist because it was not working.  She has been in A-fib for 3 years.  Her rate is controlled.  When questioned about her symptoms it does not appear she is overly symptomatic with A-fib.    PRIOR MEDS  Current Outpatient Medications on File Prior to Visit   Medication Sig Dispense Refill    acetaminophen (TYLENOL) 650 MG 8 hr tablet Take 1 tablet by mouth Every Night.      amLODIPine (NORVASC) 10 MG tablet Take 1 tablet by mouth Daily. 90 tablet 3    ammonium lactate (AMLACTIN) 12 % cream Apply 1 g topically to the appropriate area as directed Daily.      apixaban (ELIQUIS) 5 MG tablet tablet Take 1 tablet by mouth 2 (Two) Times a Day. 180 tablet 3    atorvastatin (LIPITOR) 20 MG tablet Take 1 tablet by mouth Daily. (Patient taking differently: Take 4 tablets by mouth Daily.) 90 tablet 3    benazepril (LOTENSIN) 40 MG tablet TAKE 1 TABLET EVERY DAY 90 tablet 3    cetirizine (zyrTEC) 10 MG tablet Take 1 tablet by mouth Daily.      Cholecalciferol (VITAMIN D) 1000 UNITS tablet Take 1 tablet by mouth 2 (Two) Times a Day.      docusate sodium (COLACE) 50 MG capsule Take 1 capsule by mouth Daily As Needed for Constipation.      fluticasone (FLONASE) 50 MCG/ACT nasal spray 2 sprays into the nostril(s) as directed by provider Every Night.      FOLIC ACID PO Take 400 mg by mouth Daily.      hydroCHLOROthiazide 25 MG tablet Take 1 tablet by mouth Daily. 90 tablet 3    Loratadine 10 MG capsule Take 1 capsule by mouth Every Night.      melatonin 5 MG tablet tablet Take 1 tablet by mouth.      multivitamin (THERAGRAN) tablet tablet Take 1 tablet by mouth Daily. PT HOLDING FOR SURGERY   Centrum Silver      omeprazole (priLOSEC) 40 MG capsule Take 1 capsule by mouth Daily.      Polyvinyl Alcohol-Povidone PF (HYPOTEARS) 1.4-0.6 % ophthalmic solution Administer 1-2 drops to both eyes 2 (Two) Times a Day.      traMADol (ULTRAM) 50 MG tablet Take 1 tablet by mouth 2 (Two) Times a Day As  Needed for Pain.      [DISCONTINUED] aspirin 81 MG EC tablet Take 1 tablet by mouth Daily.      acetaminophen (TYLENOL) 650 MG 8 hr tablet Take 1 tablet by mouth Every Night.       No current facility-administered medications on file prior to visit.       ALLERGIES  Patient has no known allergies.    HISTORY  Past Medical History:   Diagnosis Date    AC (acromioclavicular) joint bone spurs 10/2021    Arthritis     Atrial fibrillation     Bursitis of both hips     Fatigue     Health care maintenance     History of kidney stones     Hyperlipidemia     Hypertension     Left foot pain     Lower back pain     Obesity     TODD (obstructive sleep apnea) 08/04/2020    Home sleep study.  Weight 209 pounds.  At least mild TODD with AHI of 13.8 events per hour.  Low O2 saturation 69% and sleep-related hypoxia present for 234 minutes.  The patient snored 18% of the total monitoring time.    SOB (shortness of breath) on exertion     TSH elevation     Ventricular hypertrophy        Social History     Socioeconomic History    Marital status:    Tobacco Use    Smoking status: Former    Smokeless tobacco: Never    Tobacco comments:     no caffeine use.  QUIT AGE 34   Vaping Use    Vaping status: Never Used   Substance and Sexual Activity    Alcohol use: Yes     Comment: VERY SELDOM    Drug use: Never    Sexual activity: Defer       Family History   Problem Relation Age of Onset    Lung cancer Mother 75    No Known Problems Father     Ovarian cancer Sister         60's    Heart attack Sister     Heart disease Sister     Heart disease Brother         open heart surgery     Breast cancer Maternal Aunt         70's    No Known Problems Maternal Grandmother     No Known Problems Maternal Grandfather     No Known Problems Paternal Grandmother     No Known Problems Paternal Grandfather     Malig Hyperthermia Neg Hx        Review of Systems   Constitutional:  Positive for fatigue.        Pt states she thinks she may have switched her  "am and pm meds when she sorted them this weekend. Her family asked if she could re-sort them.    HENT:  Positive for rhinorrhea. Negative for congestion and sore throat.    Respiratory:  Negative for chest tightness and shortness of breath.    Cardiovascular:  Positive for palpitations (AFiB x 3 years) and leg swelling (Some BLE edema- RLE is worse. Goes down overnight). Negative for chest pain.   Gastrointestinal:  Positive for constipation (worse than normal today. Takes softener and it didn't help today.).   Genitourinary: Negative.    Musculoskeletal:  Positive for back pain and gait problem (Ambulates w/ cane).        Thinks she's going to be getting a nerve ablation, not yet scheduled    Allergic/Immunologic: Positive for environmental allergies.   Neurological:  Positive for dizziness (States dizziness is worse than normal today).        Has EEG scheduled, recently saw neuro      Hematological:  Bruises/bleeds easily.   Psychiatric/Behavioral:  Negative for sleep disturbance (Melatonin).        Objective     VITALS: /65   Pulse 83   Ht 168.9 cm (66.5\")   Wt 79.4 kg (175 lb)   SpO2 97%   BMI 27.82 kg/m²     LABS:   Lab Results (most recent)       None            IMAGING:   No Images in the past 120 days found..    EXAM:    Physical Exam  Constitutional:       Appearance: Normal appearance.   Eyes:      Pupils: Pupils are equal, round, and reactive to light.   Cardiovascular:      Rate and Rhythm: Normal rate. Rhythm irregular.      Pulses:           Carotid pulses are 2+ on the right side and 2+ on the left side.       Radial pulses are 2+ on the right side and 2+ on the left side.        Dorsalis pedis pulses are 2+ on the right side and 2+ on the left side.        Posterior tibial pulses are 2+ on the right side and 2+ on the left side.      Heart sounds: Murmur heard.      Gallop: 1-2/6 systolic murmur.   Pulmonary:      Effort: Pulmonary effort is normal.      Breath sounds: Normal breath " sounds.   Abdominal:      General: Bowel sounds are normal.      Palpations: Abdomen is soft.   Musculoskeletal:      Right lower leg: very mild Edema present.      Left lower leg: very mild Edema present.   Skin:     General: Skin is warm and dry.      Capillary Refill: Capillary refill takes less than 2 seconds.   Neurological:      General: No focal deficit present.      Mental Status: She is alert and oriented to person, place, and time.   Psychiatric:         Mood and Affect: Mood normal.           Procedure   Procedures       Assessment & Plan    Diagnosis Plan   1. Ischemic stroke        2. Longstanding persistent atrial fibrillation        3. Essential hypertension        4. Hyperlipidemia, unspecified hyperlipidemia type        5. Chronic heart failure with preserved ejection fraction (HFpEF)          Plan:  1.  Ischemic stroke: Continue Eliquis for long-term anticoagulation and high-dose statin.  She also follows with neurology.  No current signs or symptoms of stroke.  No current signs or symptoms of bleeding.    2.  Longstanding persistent atrial fibrillation: Continue Eliquis for long-term anticoagulation.  Her rate is controlled.  We did discuss her symptoms.  She has been in A-fib for over 3 years.  Other than some fatigue which she does feel is related to her back pain to some extent she is not overly symptomatic with A-fib.  She did want to discuss an ablation.  We did review the procedure and after discussion she is not sure she would want to have any type of procedure done.  Will continue with medical management for now.    3.  Essential hypertension: Blood pressure under good control with current medication regimen.  The patient was instructed to monitor BP at home and to notify our office if systolic BP is consistently greater than 130-135 systolic.  Goal BP is 130-135/70-80.  4.  Hyperlipidemia: Continue statin.  5.  HFpEF: We will continue current diuretic.  We did discuss adding an SGLT2  inhibitor at her last visit but the patient prefer preferred to wait on this at this time as her symptoms are stable.     Return in about 6 months (around 11/21/2024).    Denise NIEVES Staley  reports that she has quit smoking. She has never used smokeless tobacco.     Advance Care Planning   ACP discussion was declined by the patient. Patient has an advance directive (not in EMR), copy requested.                         MEDS ORDERED DURING VISIT:  No orders of the defined types were placed in this encounter.      DISCONTINUED MEDS DURING VISIT:   Medications Discontinued During This Encounter   Medication Reason    aspirin 81 MG EC tablet           This document has been electronically signed by ELIEL Brooke  May 21, 2024 13:10 EDT    Dictated Utilizing Dragon Dictation: Part of this note may be an electronic transcription/translation of spoken language to printed text using the Dragon Dictation System

## 2024-06-14 ENCOUNTER — TELEPHONE (OUTPATIENT)
Dept: CARDIOLOGY | Facility: CLINIC | Age: 79
End: 2024-06-14

## 2024-06-14 NOTE — TELEPHONE ENCOUNTER
The [facility] received a fax that requires your attention. The document has been indexed to the patient’s chart for your review.      Reason for sending: EXTERNAL MEDICAL RECORD NOTIFICATION     Documents Description: MEDICAL RECORD REQ-[facility]-6.14.24    Name of Sender: [facility]     Date Indexed: 6.14.24    WANTING 1.9.24 OV NOTE

## 2024-06-18 ENCOUNTER — HOSPITAL ENCOUNTER (OUTPATIENT)
Dept: SLEEP MEDICINE | Facility: HOSPITAL | Age: 79
Discharge: HOME | End: 2024-06-18
Admitting: PSYCHIATRY & NEUROLOGY
Payer: MEDICARE

## 2024-06-18 DIAGNOSIS — G31.84 MILD COGNITIVE IMPAIRMENT: ICD-10-CM

## 2024-06-18 DIAGNOSIS — F44.89 CONFUSION STATE: ICD-10-CM

## 2024-06-18 DIAGNOSIS — Z86.73 HISTORY OF STROKE: ICD-10-CM

## 2024-06-18 PROCEDURE — 95813 EEG EXTND MNTR 61-119 MIN: CPT

## 2024-06-18 PROCEDURE — 95813 EEG EXTND MNTR 61-119 MIN: CPT | Performed by: PSYCHIATRY & NEUROLOGY

## 2024-11-21 ENCOUNTER — OFFICE VISIT (OUTPATIENT)
Dept: CARDIOLOGY | Facility: CLINIC | Age: 79
End: 2024-11-21
Payer: MEDICARE

## 2024-11-21 VITALS
HEART RATE: 90 BPM | SYSTOLIC BLOOD PRESSURE: 104 MMHG | HEIGHT: 67 IN | WEIGHT: 168.4 LBS | DIASTOLIC BLOOD PRESSURE: 74 MMHG | BODY MASS INDEX: 26.43 KG/M2 | OXYGEN SATURATION: 98 %

## 2024-11-21 DIAGNOSIS — E78.5 HYPERLIPIDEMIA, UNSPECIFIED HYPERLIPIDEMIA TYPE: ICD-10-CM

## 2024-11-21 DIAGNOSIS — I48.11 LONGSTANDING PERSISTENT ATRIAL FIBRILLATION: ICD-10-CM

## 2024-11-21 DIAGNOSIS — I10 ESSENTIAL HYPERTENSION: ICD-10-CM

## 2024-11-21 DIAGNOSIS — I50.32 CHRONIC HEART FAILURE WITH PRESERVED EJECTION FRACTION (HFPEF): ICD-10-CM

## 2024-11-21 DIAGNOSIS — I63.9 ISCHEMIC STROKE: Primary | ICD-10-CM

## 2024-11-21 NOTE — PROGRESS NOTES
Subjective     Denise Staley is a 79 y.o. female who presents today for Follow-up (6mth).    CHIEF COMPLIANT  Chief Complaint   Patient presents with    Follow-up     6mth       Active Problems:  1.  Persistent atrial fibrillation, on Eliquis  .  Status post multiple failed ablations.  2.  Essential hypertension  3.  Dyslipidemia  4.  Obstructive sleep apnea  5.  Echocardiogram 2021 with EF 66 to 70%  5.1 echocardiogram 2/6/2024: EF 60 to 65%, grade 2 diastolic dysfunction.  Moderate to severe biatrial enlargement.  RV is dilated but RV systolic function appears grossly preserved.  No septal defect, mass or thrombus.  Mild MR.  PA pressure in the low 30s.  6.  Significant osteoarthritis   7.  MRI of the brain 2/28/2024: Recent right frontal cortical ischemic infarct.  No acute hemorrhage significant mass affect or shift.  Probable microvascular change in the white matter    HPI  The patient is an 79-year-old female that returns for follow-up.  She is accompanied to the visit by her sister.  The patient denies chest pain, worsening dyspnea, syncope, near syncope or significant palpitations.  The patient does remain on Eliquis for long-term anticoagulation.  She has no current signs or symptoms of stroke.  The patient's main complaint is ongoing pain due to arthritis.  She is currently getting injections in her knee.  Heart rate and blood pressure stable.    PRIOR MEDS  Current Outpatient Medications on File Prior to Visit   Medication Sig Dispense Refill    acetaminophen (TYLENOL) 650 MG 8 hr tablet Take 1 tablet by mouth Every Night.      amLODIPine (NORVASC) 10 MG tablet Take 1 tablet by mouth Daily. 90 tablet 3    apixaban (ELIQUIS) 5 MG tablet tablet Take 1 tablet by mouth 2 (Two) Times a Day. 180 tablet 3    atorvastatin (LIPITOR) 20 MG tablet Take 1 tablet by mouth Daily. (Patient taking differently: Take 4 tablets by mouth Daily.) 90 tablet 3    benazepril (LOTENSIN) 40 MG tablet TAKE 1 TABLET EVERY DAY  90 tablet 3    Cholecalciferol (VITAMIN D) 1000 UNITS tablet Take 1 tablet by mouth 2 (Two) Times a Day.      Docusate Calcium (STOOL SOFTENER PO) Take  by mouth.      fluticasone (FLONASE) 50 MCG/ACT nasal spray Administer 2 sprays into the nostril(s) as directed by provider Every Night.      FOLIC ACID PO Take 400 mg by mouth Daily.      hydroCHLOROthiazide 25 MG tablet Take 1 tablet by mouth Daily. 90 tablet 3    Loratadine 10 MG capsule Take 1 capsule by mouth Every Night.      multivitamin (THERAGRAN) tablet tablet Take 1 tablet by mouth Daily. PT HOLDING FOR SURGERY   Centrum Silver      Polyvinyl Alcohol-Povidone PF (HYPOTEARS) 1.4-0.6 % ophthalmic solution Administer 1-2 drops to both eyes 2 (Two) Times a Day.      traMADol (ULTRAM) 50 MG tablet Take 1 tablet by mouth 3 (Three) Times a Day As Needed for Pain.      acetaminophen (TYLENOL) 650 MG 8 hr tablet Take 1 tablet by mouth Every Night.      ammonium lactate (AMLACTIN) 12 % cream Apply 1 g topically to the appropriate area as directed Daily.      cetirizine (zyrTEC) 10 MG tablet Take 1 tablet by mouth Daily.      docusate sodium (COLACE) 50 MG capsule Take 1 capsule by mouth Daily As Needed for Constipation.      melatonin 5 MG tablet tablet Take 1 tablet by mouth.      omeprazole (priLOSEC) 40 MG capsule Take 1 capsule by mouth Daily.       No current facility-administered medications on file prior to visit.       ALLERGIES  Patient has no known allergies.    HISTORY  Past Medical History:   Diagnosis Date    AC (acromioclavicular) joint bone spurs 10/2021    Arthritis     Atrial fibrillation     Bursitis of both hips     Fatigue     Health care maintenance     History of kidney stones     Hyperlipidemia     Hypertension     Left foot pain     Lower back pain     Obesity     TODD (obstructive sleep apnea) 08/04/2020    Home sleep study.  Weight 209 pounds.  At least mild TODD with AHI of 13.8 events per hour.  Low O2 saturation 69% and sleep-related  "hypoxia present for 234 minutes.  The patient snored 18% of the total monitoring time.    SOB (shortness of breath) on exertion     TSH elevation     Ventricular hypertrophy        Social History     Socioeconomic History    Marital status:    Tobacco Use    Smoking status: Former    Smokeless tobacco: Never    Tobacco comments:     no caffeine use.  QUIT AGE 34   Vaping Use    Vaping status: Never Used   Substance and Sexual Activity    Alcohol use: Yes     Comment: VERY SELDOM    Drug use: Never    Sexual activity: Defer       Family History   Problem Relation Age of Onset    Lung cancer Mother 75    No Known Problems Father     Ovarian cancer Sister         60's    Heart attack Sister     Heart disease Sister     Heart disease Brother         open heart surgery     Breast cancer Maternal Aunt         70's    No Known Problems Maternal Grandmother     No Known Problems Maternal Grandfather     No Known Problems Paternal Grandmother     No Known Problems Paternal Grandfather     Malig Hyperthermia Neg Hx        Review of Systems   Constitutional:  Negative for fatigue.   Respiratory:  Negative for chest tightness and shortness of breath.    Cardiovascular:  Positive for leg swelling (Wears compression hoes. Usually does okay w/edema unless on a long trip). Negative for chest pain and palpitations.   Musculoskeletal:  Positive for arthralgias, back pain and gait problem (Ambulates w/ cane).   Neurological:  Positive for weakness (Back and knees) and headaches (Occasionally- states it's nothing debilitating). Negative for dizziness.   Hematological:  Bruises/bleeds easily.   Psychiatric/Behavioral:  Positive for sleep disturbance (Struggles to get comfortable).        Objective     VITALS: /74   Pulse 90   Ht 168.9 cm (66.5\")   Wt 76.4 kg (168 lb 6.4 oz)   SpO2 98%   BMI 26.77 kg/m²     LABS:   Lab Results (most recent)       None            IMAGING:   No Images in the past 120 days " found..    EXAM:  Constitutional:       Appearance: Normal appearance.   Eyes:      Pupils: Pupils are equal, round, and reactive to light.   Cardiovascular:      Rate and Rhythm: Normal rate. Rhythm irregular.      Pulses:           Carotid pulses are 2+ on the right side and 2+ on the left side.       Radial pulses are 2+ on the right side and 2+ on the left side.        Dorsalis pedis pulses are 2+ on the right side and 2+ on the left side.        Posterior tibial pulses are 2+ on the right side and 2+ on the left side.      Heart sounds: Murmur heard.      Gallop: 1-2/6 systolic murmur.   Pulmonary:      Effort: Pulmonary effort is normal.      Breath sounds: Normal breath sounds.   Abdominal:      General: Bowel sounds are normal.      Palpations: Abdomen is soft.   Musculoskeletal:      Right lower leg: very mild Edema present.      Left lower leg: very mild Edema present.   Skin:     General: Skin is warm and dry.      Capillary Refill: Capillary refill takes less than 2 seconds.   Neurological:      General: No focal deficit present.      Mental Status: She is alert and oriented to person, place, and time.   Psychiatric:         Mood and Affect: Mood normal.          Procedure   Procedures       Assessment & Plan    Diagnosis Plan   1. Ischemic stroke        2. Longstanding persistent atrial fibrillation        3. Essential hypertension        4. Hyperlipidemia, unspecified hyperlipidemia type        5. Chronic heart failure with preserved ejection fraction (HFpEF)             Plan:  1.  Ischemic stroke: Continue Eliquis for long-term anticoagulation and high-dose statin.  She also follows with neurology.  No current signs or symptoms of stroke.  No current signs or symptoms of bleeding.    2.  Longstanding persistent atrial fibrillation: Continue Eliquis for long-term anticoagulation.  Her rate is controlled.  No current signs or symptoms of stroke.  No current signs or symptoms of bleeding.    3.   Essential hypertension: Blood pressure under good control with current medication regimen.  The patient was instructed to monitor BP at home and to notify our office if systolic BP is consistently greater than 130-135 systolic.  Goal BP is 130-135/70-80.  4.  Hyperlipidemia: Continue statin.  Will periodically review lipid panel.  5.  HFpEF: We will continue current diuretic.  Patient denies worsening this or increasing edema.  She was vies notify our office if the symptoms develop.      Return in about 6 months (around 5/21/2025).    Denise Staley  reports that she has quit smoking. She has never used smokeless tobacco.          Advance Care Planning   ACP discussion was declined by the patient. Patient has an advance directive (not in EMR), copy requested.           MEDS ORDERED DURING VISIT:  No orders of the defined types were placed in this encounter.      DISCONTINUED MEDS DURING VISIT:   There are no discontinued medications.       This document has been electronically signed by ELIEL Brooke  November 21, 2024 12:56 EST    Dictated Utilizing Dragon Dictation: Part of this note may be an electronic transcription/translation of spoken language to printed text using the Dragon Dictation System

## 2025-03-03 NOTE — TELEPHONE ENCOUNTER
Received faxed RF request from [facility] for pt's Eliquis. Sent in RF as requested.

## 2025-03-13 ENCOUNTER — TELEPHONE (OUTPATIENT)
Dept: CARDIOLOGY | Facility: CLINIC | Age: 80
End: 2025-03-13
Payer: MEDICARE

## 2025-03-13 NOTE — TELEPHONE ENCOUNTER
The patient called to report she was having trouble getting a refill of Eliquis. She needs a small refill sent to [place] to get her by until her Rx arrives from mail order. Medication refilled as requested and patient notified.

## 2025-05-21 ENCOUNTER — OFFICE VISIT (OUTPATIENT)
Dept: CARDIOLOGY | Facility: CLINIC | Age: 80
End: 2025-05-21
Payer: MEDICARE

## 2025-05-21 VITALS
BODY MASS INDEX: 27.94 KG/M2 | HEART RATE: 68 BPM | OXYGEN SATURATION: 99 % | WEIGHT: 178 LBS | DIASTOLIC BLOOD PRESSURE: 68 MMHG | SYSTOLIC BLOOD PRESSURE: 124 MMHG | HEIGHT: 67 IN

## 2025-05-21 DIAGNOSIS — I10 ESSENTIAL HYPERTENSION: ICD-10-CM

## 2025-05-21 DIAGNOSIS — E78.5 HYPERLIPIDEMIA, UNSPECIFIED HYPERLIPIDEMIA TYPE: ICD-10-CM

## 2025-05-21 DIAGNOSIS — I63.9 ISCHEMIC STROKE: Primary | ICD-10-CM

## 2025-05-21 DIAGNOSIS — I50.32 CHRONIC HEART FAILURE WITH PRESERVED EJECTION FRACTION (HFPEF): ICD-10-CM

## 2025-05-21 DIAGNOSIS — I48.11 LONGSTANDING PERSISTENT ATRIAL FIBRILLATION: ICD-10-CM

## 2025-05-21 NOTE — PROGRESS NOTES
Subjective     Denise Staley is a 79 y.o. female who presents today for Follow-up (6mth).    CHIEF COMPLIANT  Chief Complaint   Patient presents with    Follow-up     6mth       Active Problems:  1.  Persistent atrial fibrillation, on Eliquis  .  Status post multiple failed ablations.  2.  Essential hypertension  3.  Dyslipidemia  4.  Obstructive sleep apnea  5.  Echocardiogram 2021 with EF 66 to 70%  5.1 echocardiogram 2/6/2024: EF 60 to 65%, grade 2 diastolic dysfunction.  Moderate to severe biatrial enlargement.  RV is dilated but RV systolic function appears grossly preserved.  No septal defect, mass or thrombus.  Mild MR.  PA pressure in the low 30s.  6.  Significant osteoarthritis   7.  MRI of the brain 2/28/2024: Recent right frontal cortical ischemic infarct.  No acute hemorrhage significant mass affect or shift.  Probable microvascular change in the white matter    HPI  The patient is an 79 year old female that returns for follow up.  She denies chest pain, significant dyspnea, syncope.  Over the past couple days she has had 2 episodes of dizziness.  The first 1 occurred when she was at the [employer] walking out to her car.  She had no associated symptoms and dizziness subsided after she sat down in her car for a few minutes.  She had 1 other occurrence of dizziness but this was not as severe as the first episode.  She states her blood pressure has been stable.  She will occasionally feel a palpitation but states this does not occur often.  Heart rate stable.  We did review her most recent labs which were done in April by primary care.  Lipid panel shows adequate control.    PRIOR MEDS  Current Outpatient Medications on File Prior to Visit   Medication Sig Dispense Refill    acetaminophen (TYLENOL) 650 MG 8 hr tablet Take 1 tablet by mouth Every Night.      amLODIPine (NORVASC) 10 MG tablet Take 1 tablet by mouth Daily. 90 tablet 3    ammonium lactate (AMLACTIN) 12 % cream Apply 1 g topically  to the appropriate area as directed Daily.      apixaban (ELIQUIS) 5 MG tablet tablet Take 1 tablet by mouth 2 (Two) Times a Day. 5 tablet 0    atorvastatin (LIPITOR) 20 MG tablet Take 1 tablet by mouth Daily. (Patient taking differently: Take 4 tablets by mouth Daily.) 90 tablet 3    benazepril (LOTENSIN) 40 MG tablet TAKE 1 TABLET EVERY DAY 90 tablet 3    Cholecalciferol (VITAMIN D) 1000 UNITS tablet Take 1 tablet by mouth 2 (Two) Times a Day.      docusate sodium (COLACE) 50 MG capsule Take 1 capsule by mouth Daily As Needed for Constipation.      fluticasone (FLONASE) 50 MCG/ACT nasal spray Administer 2 sprays into the nostril(s) as directed by provider Every Night.      FOLIC ACID PO Take 400 mg by mouth Daily.      hydroCHLOROthiazide 25 MG tablet Take 1 tablet by mouth Daily. 90 tablet 3    Loratadine 10 MG capsule Take 1 capsule by mouth Every Night.      multivitamin (THERAGRAN) tablet tablet Take 1 tablet by mouth Daily. PT HOLDING FOR SURGERY   Centrum Silver      omeprazole (priLOSEC) 40 MG capsule Take 1 capsule by mouth Daily.      Polyvinyl Alcohol-Povidone PF (HYPOTEARS) 1.4-0.6 % ophthalmic solution Administer 1-2 drops to both eyes 2 (Two) Times a Day.      traMADol (ULTRAM) 50 MG tablet Take 1 tablet by mouth 3 (Three) Times a Day As Needed for Pain.      acetaminophen (TYLENOL) 650 MG 8 hr tablet Take 1 tablet by mouth Every Night.      cetirizine (zyrTEC) 10 MG tablet Take 1 tablet by mouth Daily.      Docusate Calcium (STOOL SOFTENER PO) Take  by mouth.      melatonin 5 MG tablet tablet Take 1 tablet by mouth.       No current facility-administered medications on file prior to visit.       ALLERGIES  Patient has no known allergies.    HISTORY  Past Medical History:   Diagnosis Date    AC (acromioclavicular) joint bone spurs 10/2021    Arthritis     Atrial fibrillation     Bursitis of both hips     Fatigue     Health care maintenance     History of kidney stones     Hyperlipidemia      "Hypertension     Left foot pain     Lower back pain     Obesity     TODD (obstructive sleep apnea) 08/04/2020    Home sleep study.  Weight 209 pounds.  At least mild TODD with AHI of 13.8 events per hour.  Low O2 saturation 69% and sleep-related hypoxia present for 234 minutes.  The patient snored 18% of the total monitoring time.    SOB (shortness of breath) on exertion     TSH elevation     Ventricular hypertrophy        Social History     Socioeconomic History    Marital status:    Tobacco Use    Smoking status: Former    Smokeless tobacco: Never    Tobacco comments:     no caffeine use.  QUIT AGE 34   Vaping Use    Vaping status: Never Used   Substance and Sexual Activity    Alcohol use: Yes     Comment: VERY SELDOM    Drug use: Never    Sexual activity: Defer       Family History   Problem Relation Age of Onset    Lung cancer Mother 75    No Known Problems Father     Ovarian cancer Sister         60's    Heart attack Sister     Heart disease Sister     Heart disease Brother         open heart surgery     Breast cancer Maternal Aunt         70's    No Known Problems Maternal Grandmother     No Known Problems Maternal Grandfather     No Known Problems Paternal Grandmother     No Known Problems Paternal Grandfather     Malig Hyperthermia Neg Hx        Review of Systems   Constitutional:  Negative for fatigue.   Eyes:  Positive for visual disturbance (Glasses daily).   Respiratory:  Negative for chest tightness and shortness of breath.    Cardiovascular:  Positive for leg swelling (BLE edema \"a little bit\"). Negative for chest pain and palpitations.   Musculoskeletal:  Positive for back pain (States she has severe pain her back) and gait problem (Ambulates w/ cane).   Neurological:  Positive for dizziness (States she's been having dizzy spells recently. States she was walking around the store, sat down in her car, and felt dizzy. States it goes away after a little while of sitting.). Negative for weakness and " "numbness.   Psychiatric/Behavioral:  Positive for sleep disturbance (States sometimes she struggles to go to sleep).        Objective     VITALS: /68   Pulse 68   Ht 168.9 cm (66.5\")   Wt 80.7 kg (178 lb)   SpO2 99%   BMI 28.30 kg/m²     LABS:   Lab Results (most recent)       None            IMAGING:   No Images in the past 120 days found..    EXAM:  Physical Exam  Constitutional:       Appearance: Normal appearance.   Eyes:      Pupils: Pupils are equal, round, and reactive to light.   Cardiovascular:      Rate and Rhythm: Normal rate. Rhythm irregular.      Pulses:           Carotid pulses are 2+ on the right side and 2+ on the left side.       Radial pulses are 2+ on the right side and 2+ on the left side.        Dorsalis pedis pulses are 2+ on the right side and 2+ on the left side.        Posterior tibial pulses are 2+ on the right side and 2+ on the left side.      Heart sounds: Murmur heard.      Gallop: 1-2/6 systolic murmur.   Pulmonary:      Effort: Pulmonary effort is normal.      Breath sounds: Normal breath sounds.   Abdominal:      General: Bowel sounds are normal.      Palpations: Abdomen is soft.   Musculoskeletal:      Right lower leg: very mild Edema present.      Left lower leg: very mild Edema present.   Skin:     General: Skin is warm and dry.      Capillary Refill: Capillary refill takes less than 2 seconds.   Neurological:      General: No focal deficit present.      Mental Status: She is alert and oriented to person, place, and time.   Psychiatric:         Mood and Affect: Mood normal.      Procedure   Procedures       Assessment & Plan    Diagnosis Plan   1. Ischemic stroke        2. Longstanding persistent atrial fibrillation        3. Essential hypertension        4. Hyperlipidemia, unspecified hyperlipidemia type        5. Chronic heart failure with preserved ejection fraction (HFpEF)          Plan:  1.  Ischemic stroke: Continue Eliquis for long-term anticoagulation.  " Continue statin.  She continues to follow with neurology.  No current signs or symptoms of stroke.  No current signs or symptoms of bleeding.    2.  Longstanding persistent atrial fibrillation: Continue Eliquis for long-term anticoagulation.  Her rate is controlled.  No current signs or symptoms of stroke.  No current signs or symptoms of bleeding.  Most recent H&H stable.  3.  Essential hypertension: Blood pressure stable.  The patient was instructed to monitor BP at home and to notify our office if systolic BP is consistently greater than 130-135 systolic.  Goal BP is 130-135/70-80.  4.  Hyperlipidemia: Continue statin.  Most recent lipid panel shows good control.    5.  HFpEF: We will continue current diuretic.  Patient denies increasing edema or dyspnea.  She was advised notify our office if the symptoms develop.  6.  Dizziness: We did discuss the patient's recent episodes of dizziness.  This is occurred twice.  The patient does not feel this is an issue with her blood pressure.  When we discussed her fluid intake she states she has not been drinking enough water.  She was encouraged to increase noncaffeinated fluid intake to around 80 ounces per day.  She also has had some recent issues with allergies and we did discuss that this could be a potential cause.  It has been a couple days since the patient has had symptoms.  At this time we will monitor.  For continued symptoms the patient was advised to notify our office.  If intermittent dizziness is persistent we would consider a Holter monitor for possible carotid Doppler.  This was discussed with the patient and she verbalizes an understanding.      Return in about 6 months (around 11/21/2025).    Denise Staley  reports that she has quit smoking. She has never used smokeless tobacco.     BMI is >= 25 and <30. (Overweight) The following options were offered after discussion;: referral to primary care    Advance Care Planning  ACP discussion was declined by  the patient. Patient has an advance directive (not in EMR), copy requested.       MEDS ORDERED DURING VISIT:  No orders of the defined types were placed in this encounter.      DISCONTINUED MEDS DURING VISIT:   There are no discontinued medications.       This document has been electronically signed by ELIEL Brooke  May 21, 2025 13:53 EDT    Dictated Utilizing Dragon Dictation: Part of this note may be an electronic transcription/translation of spoken language to printed text using the Dragon Dictation System

## 2025-05-27 RX ORDER — HYDROCHLOROTHIAZIDE 25 MG/1
25 TABLET ORAL DAILY
Qty: 90 TABLET | Refills: 3 | Status: SHIPPED | OUTPATIENT
Start: 2025-05-27

## (undated) DEVICE — APPL CHLORAPREP HI/LITE 26ML ORNG

## (undated) DEVICE — TBG PENCL TELESCP MEGADYNE SMOKE EVAC 10FT

## (undated) DEVICE — GLV SURG BIOGEL LTX PF 8

## (undated) DEVICE — PK ORTHO MINOR TOWER 40

## (undated) DEVICE — PAD,ABDOMINAL,8"X10",ST,LF: Brand: MEDLINE

## (undated) DEVICE — PROXIMATE RH ROTATING HEAD SKIN STAPLERS (35 WIDE) CONTAINS 35 STAINLESS STEEL STAPLES: Brand: PROXIMATE

## (undated) DEVICE — UNDERCAST PADDING: Brand: DEROYAL

## (undated) DEVICE — GLV SURG SIGNATURE ESSENTIAL PF LTX SZ8

## (undated) DEVICE — STCKNT IMPERV 12IN STRL

## (undated) DEVICE — BNDG ELAS ELITE V/CLOSE 4IN 5YD LF STRL

## (undated) DEVICE — SUT VIC 3/0 CT2 27IN J232H

## (undated) DEVICE — TRAP FLD MINIVAC MEGADYNE 100ML

## (undated) DEVICE — GOWN,REINF,POLY,SIRUS,BREATH SLV,XLNG/XL: Brand: MEDLINE

## (undated) DEVICE — SUT VIC 2/0 CT2 27IN J269H

## (undated) DEVICE — SUT ETHLN 3/0 PS1 18IN 1663H

## (undated) DEVICE — GLV SURG PREMIERPRO ORTHO LTX PF SZ8 BRN

## (undated) DEVICE — SOL ISO/ALC RUB 70PCT 4OZ

## (undated) DEVICE — INTENDED FOR TISSUE SEPARATION, AND OTHER PROCEDURES THAT REQUIRE A SHARP SURGICAL BLADE TO PUNCTURE OR CUT.: Brand: BARD-PARKER ® CARBON RIB-BACK BLADES

## (undated) DEVICE — DRSNG GZ PETROLTM XEROFORM CURAD 1X8IN STRL

## (undated) DEVICE — DRAPE,U/ SHT,SPLIT,PLAS,STERIL: Brand: MEDLINE